# Patient Record
Sex: FEMALE | Race: WHITE | NOT HISPANIC OR LATINO | Employment: OTHER | URBAN - METROPOLITAN AREA
[De-identification: names, ages, dates, MRNs, and addresses within clinical notes are randomized per-mention and may not be internally consistent; named-entity substitution may affect disease eponyms.]

---

## 2017-01-16 ENCOUNTER — STROKE FOLLOW UP (OUTPATIENT)
Dept: NEUROSURGERY | Facility: CLINIC | Age: 77
End: 2017-01-16

## 2017-01-16 VITALS
RESPIRATION RATE: 16 BRPM | HEART RATE: 70 BPM | DIASTOLIC BLOOD PRESSURE: 74 MMHG | BODY MASS INDEX: 25.47 KG/M2 | SYSTOLIC BLOOD PRESSURE: 150 MMHG | WEIGHT: 138.4 LBS | TEMPERATURE: 98.3 F | HEIGHT: 62 IN

## 2017-01-16 DIAGNOSIS — Q21.12 PFO (PATENT FORAMEN OVALE): ICD-10-CM

## 2017-01-16 DIAGNOSIS — I10 ESSENTIAL HYPERTENSION: ICD-10-CM

## 2017-01-16 DIAGNOSIS — I63.9 ACUTE CVA (CEREBROVASCULAR ACCIDENT) (HCC): Primary | ICD-10-CM

## 2017-01-16 PROCEDURE — 99024 POSTOP FOLLOW-UP VISIT: CPT | Performed by: NEUROLOGICAL SURGERY

## 2017-01-16 NOTE — PATIENT INSTRUCTIONS
Patient will continue to see PCP on a regularly scheduled basis for follow up.  She will call if needed.  We talked about all of the things she has been doing for lifestyle changes such as going to [employer] and walking in the mall and how she is on the right track and should continue.  If there are any signs and symptoms of a stroke she should go to the nearest emergency room, ie: slurred speech, inability to use one side of the body or the other, facial droop.      It has been a pleasure to take care of Mrs. Vera.

## 2017-01-16 NOTE — MR AVS SNAPSHOT
Anabell Vera   1/16/2017 3:00 PM   Stroke Follow Up    Dept Phone:  [phone]   Encounter #:  [id number]    Provider:  Mari Serrano RN   Department:  [facility]                Your Full Care Plan              Your Updated Medication List          This list is accurate as of: 1/16/17  4:29 PM.  Always use your most recent med list.                ASPIRIN LOW DOSE 81 MG tablet   Generic drug:  aspirin       atorvastatin 80 MG tablet   Commonly known as:  LIPITOR   Take 0.5 tablets by mouth Every Night.       CALCIUM-MAGNESIUM-VITAMIN D PO       cholecalciferol 1000 UNITS tablet   Commonly known as:  VITAMIN D3       clopidogrel 75 MG tablet   Commonly known as:  PLAVIX   Take 1 tablet by mouth Daily.       MULTI VITAMIN/MINERALS PO       ramipril 10 MG capsule   Commonly known as:  ALTACE   Take 1 capsule by mouth Daily.               You Were Diagnosed With        Codes Comments    Acute CVA (cerebrovascular accident)    -  Primary ICD-10-CM: I63.9  ICD-9-CM: 434.91     Essential hypertension     ICD-10-CM: I10  ICD-9-CM: 401.9     PFO (patent foramen ovale)     ICD-10-CM: Q21.1  ICD-9-CM: 745.5       Instructions    Patient will continue to see PCP on a regularly scheduled basis for follow up.  She will call if needed.  We talked about all of the things she has been doing for lifestyle changes such as going to [employer] and walking in the mall and how she is on the right track and should continue.  If there are any signs and symptoms of a stroke she should go to the nearest emergency room, ie: slurred speech, inability to use one side of the body or the other, facial droop.      It has been a pleasure to take care of Mrs. Vera.      Patient Instructions History      Upcoming Appointments     Visit Type Date Time Department    STROKE FOLLOW UP 1/16/2017  3:00 PM [facility]    FOLLOW UP 2/3/2017 11:45 AM [facility]    "SUBSEQUENT MEDICARE WELLNESS 11/2/2017 10:45 AM [facility]    FOLLOW UP 12/8/2017 10:30 AM [facility]      MyChart Signup     Our records indicate that you have an active [facility] IPexpert account.    You can view your After Visit Summary by going to [employer] and logging in with your IPexpert username and password.  If you don't have a IPexpert username and password but a parent or guardian has access to your record, the parent or guardian should login with their own IPexpert username and password and access your record to view the After Visit Summary.    If you have questions, you can email [email or web address] or call [phone] to talk to our IPexpert staff.  Remember, IPexpert is NOT to be used for urgent needs.  For medical emergencies, dial 911.               Other Info from Your Visit           Your Appointments     Feb 03, 2017 11:45 AM EST   Follow Up with Leonardo Choi MD   [facility] [place] (--)    [address]  [facility] [address]   [phone]           Arrive 15 minutes prior to appointment.            Nov 02, 2017 10:45 AM EDT   Subsequent Medicare Wellness with Leonardo Choi MD   [facility] [place] (--)    [address]  [facility] [address]   [phone]            Dec 08, 2017 10:30 AM EST   Follow Up with Yoly Collins MD   [facility] [place] [facility] (--)    [address]  [facility] [address] [phone]           Arrive 15 minutes prior to appointment.              Allergies     Amoxicillin      Penicillins        Vital Signs     Blood Pressure Pulse Temperature Respirations Height Weight    150/74 70 98.3 °F (36.8 °C) (Temporal Artery ) 16 62\" (157.5 cm) 138 lb 6.4 oz (62.8 kg)    Body Mass Index Smoking Status                25.31 kg/m2 Former Smoker          Problems and Diagnoses Noted     Stroke "    High blood pressure    Congenital heart disease

## 2017-01-16 NOTE — PROGRESS NOTES
"Subjective:       Anabell Vera is a 76 y.o. female who presents for follow-up of a stroke. Event occurred 4 months ago. She has residual symptoms of no residual symptoms noted.. She denies syncope, seizures, gait disturbance, balance disturbance, inability to speak, slurred speech, cognitive impairment, swallowing difficulty. Overall she feels the condition is improved. Stroke risk factors include hyperlipidemia, hypertension and Patent foamen ovale. She also complains of none. She denies chest pain, palpitations, seizures and shortness of breath.     Outside reports reviewed: ER records, historical medical records and imaging reports: MRI of head which showed a Left periventricular white matter centrum semi-ovale CVA.    The following portions of the patient's history were reviewed and updated as appropriate: allergies, current medications, past family history, past medical history, past social history, past surgical history and problem list.    Review of Systems  A comprehensive review of systems was negative.      Objective:        Visit Vitals   • /74   • Pulse 70   • Temp 98.3 °F (36.8 °C) (Temporal Artery )   • Resp 16   • Ht 62\" (157.5 cm)   • Wt 138 lb 6.4 oz (62.8 kg)   • BMI 25.31 kg/m2     Neurologic: Alert and oriented X 3, normal strength and tone. Normal symmetric reflexes. Normal coordination and gait.    Imaging  No new imaging since hospitalization, however I reviewed the MRI from October.   Lab Review  No new labs to review.    Assessments:  NIHSS: 0  [place]: 0      Assessment:      Status post, a stroke  Risk Factor Management:   Hypertension target range 130-140/70-80  Lipid range - LDL < 100 and checked every 6 months, fasting.      Plan:      Medications: aspirin 81 mg orally every day and clopidogrel 75 mg orally every day  Patient is following up with Dr. Collins in 12 months or as needed.  She is also scheduled to see her PCP within the next week or two to evaluate LFT's and blood " pressure.   Follow-up with me in as needed.

## 2017-01-26 ENCOUNTER — LAB (OUTPATIENT)
Dept: INTERNAL MEDICINE | Facility: CLINIC | Age: 77
End: 2017-01-26

## 2017-01-26 DIAGNOSIS — C84.00 MYCOSIS FUNGOIDES (HCC): ICD-10-CM

## 2017-01-26 DIAGNOSIS — E78.5 ELEVATED LIPIDS: ICD-10-CM

## 2017-01-26 LAB
ALBUMIN SERPL-MCNC: 4.5 G/DL (ref 3.2–4.8)
ALBUMIN/GLOB SERPL: 2 G/DL (ref 1.5–2.5)
ALP SERPL-CCNC: 65 U/L (ref 25–100)
ALT SERPL W P-5'-P-CCNC: 28 U/L (ref 7–40)
ANION GAP SERPL CALCULATED.3IONS-SCNC: 7 MMOL/L (ref 3–11)
ARTICHOKE IGE QN: 67 MG/DL (ref 0–130)
AST SERPL-CCNC: 33 U/L (ref 0–33)
BASOPHILS # BLD MANUAL: 0 10*3/MM3 (ref 0–0.2)
BASOPHILS NFR BLD AUTO: 0 % (ref 0–1)
BILIRUB SERPL-MCNC: 0.8 MG/DL (ref 0.3–1.2)
BUN BLD-MCNC: 16 MG/DL (ref 9–23)
BUN/CREAT SERPL: 20 (ref 7–25)
CALCIUM SPEC-SCNC: 9.8 MG/DL (ref 8.7–10.4)
CHLORIDE SERPL-SCNC: 97 MMOL/L (ref 99–109)
CHOLEST SERPL-MCNC: 138 MG/DL (ref 0–200)
CO2 SERPL-SCNC: 30 MMOL/L (ref 20–31)
CREAT BLD-MCNC: 0.8 MG/DL (ref 0.6–1.3)
DEPRECATED RDW RBC AUTO: 42.4 FL (ref 37–54)
EOSINOPHIL # BLD MANUAL: 0.11 10*3/MM3 (ref 0.1–0.3)
EOSINOPHIL NFR BLD MANUAL: 2 % (ref 0–3)
ERYTHROCYTE [DISTWIDTH] IN BLOOD BY AUTOMATED COUNT: 12.9 % (ref 11.3–14.5)
GFR SERPL CREATININE-BSD FRML MDRD: 70 ML/MIN/1.73
GLOBULIN UR ELPH-MCNC: 2.3 GM/DL
GLUCOSE BLD-MCNC: 98 MG/DL (ref 70–100)
HCT VFR BLD AUTO: 43.1 % (ref 34.5–44)
HDLC SERPL-MCNC: 49 MG/DL (ref 40–60)
HGB BLD-MCNC: 14.5 G/DL (ref 11.5–15.5)
LYMPHOCYTES # BLD MANUAL: 2.37 10*3/MM3 (ref 0.6–4.8)
LYMPHOCYTES NFR BLD MANUAL: 42 % (ref 24–44)
LYMPHOCYTES NFR BLD MANUAL: 6 % (ref 0–12)
MCH RBC QN AUTO: 30.1 PG (ref 27–31)
MCHC RBC AUTO-ENTMCNC: 33.6 G/DL (ref 32–36)
MCV RBC AUTO: 89.4 FL (ref 80–99)
MONOCYTES # BLD AUTO: 0.34 10*3/MM3 (ref 0–1)
NEUTROPHILS # BLD AUTO: 2.82 10*3/MM3 (ref 1.5–8.3)
NEUTROPHILS NFR BLD MANUAL: 50 % (ref 41–71)
PLAT MORPH BLD: NORMAL
PLATELET # BLD AUTO: 168 10*3/MM3 (ref 150–450)
PMV BLD AUTO: 11.8 FL (ref 6–12)
POTASSIUM BLD-SCNC: 4.7 MMOL/L (ref 3.5–5.5)
PROT SERPL-MCNC: 6.8 G/DL (ref 5.7–8.2)
RBC # BLD AUTO: 4.82 10*6/MM3 (ref 3.89–5.14)
RBC MORPH BLD: NORMAL
SODIUM BLD-SCNC: 134 MMOL/L (ref 132–146)
TRIGL SERPL-MCNC: 68 MG/DL (ref 0–150)
WBC MORPH BLD: NORMAL
WBC NRBC COR # BLD: 5.64 10*3/MM3 (ref 3.5–10.8)

## 2017-01-26 PROCEDURE — 80053 COMPREHEN METABOLIC PANEL: CPT | Performed by: HOSPITALIST

## 2017-01-26 PROCEDURE — 80061 LIPID PANEL: CPT | Performed by: HOSPITALIST

## 2017-01-26 PROCEDURE — 85027 COMPLETE CBC AUTOMATED: CPT | Performed by: HOSPITALIST

## 2017-01-26 PROCEDURE — 85007 BL SMEAR W/DIFF WBC COUNT: CPT | Performed by: HOSPITALIST

## 2017-02-03 ENCOUNTER — OFFICE VISIT (OUTPATIENT)
Dept: INTERNAL MEDICINE | Facility: CLINIC | Age: 77
End: 2017-02-03

## 2017-02-03 VITALS
SYSTOLIC BLOOD PRESSURE: 110 MMHG | WEIGHT: 131 LBS | DIASTOLIC BLOOD PRESSURE: 74 MMHG | HEIGHT: 62 IN | BODY MASS INDEX: 24.11 KG/M2

## 2017-02-03 DIAGNOSIS — I10 ESSENTIAL HYPERTENSION: ICD-10-CM

## 2017-02-03 DIAGNOSIS — C84.05 MYCOSIS FUNGOIDES INVOLVING LYMPH NODES OF LOWER EXTREMITY (HCC): ICD-10-CM

## 2017-02-03 DIAGNOSIS — I63.9 ACUTE CVA (CEREBROVASCULAR ACCIDENT) (HCC): Primary | ICD-10-CM

## 2017-02-03 DIAGNOSIS — E78.49 OTHER HYPERLIPIDEMIA: ICD-10-CM

## 2017-02-03 PROCEDURE — 99214 OFFICE O/P EST MOD 30 MIN: CPT | Performed by: HOSPITALIST

## 2017-02-03 RX ORDER — CLOPIDOGREL BISULFATE 75 MG/1
75 TABLET ORAL DAILY
Qty: 90 TABLET | Refills: 1 | Status: SHIPPED | OUTPATIENT
Start: 2017-02-03 | End: 2017-08-14 | Stop reason: SDUPTHER

## 2017-02-03 NOTE — PROGRESS NOTES
"Subjective   Anabell Vera is a 76 y.o. female.     HPI Comments: She is here for f/u of her stroke, hyperlipidemia and her hematuria. She has seen Dr. 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 She has had a stroke and she is on Plavix. She had an event monitor       Follow-up (Elevated lipids)      The following portions of the patient's history were reviewed and updated as appropriate: allergies, current medications, past family history, past medical history, past social history, past surgical history and problem list.    Review of Systems   Constitutional: Negative for activity change and appetite change.   HENT: Negative for congestion and dental problem.    Eyes: Negative for discharge and itching.   Respiratory: Negative for apnea and chest tightness.    Cardiovascular: Negative for chest pain.   Gastrointestinal: Negative for abdominal distention, blood in stool and constipation.   Musculoskeletal: Negative for arthralgias and back pain.       Objective  Blood pressure 110/74, height 62\" (157.5 cm), weight 131 lb (59.4 kg).      Physical Exam   Constitutional: She is oriented to person, place, and time. She appears well-developed and well-nourished.   HENT:   Head: Normocephalic and atraumatic.   Eyes: Conjunctivae and EOM are normal. Pupils are equal, round, and reactive to light.   Neck: Normal range of motion. Neck supple.   Cardiovascular: Normal rate, regular rhythm, normal heart sounds and intact distal pulses.    Pulmonary/Chest: Effort normal and breath sounds normal.   Abdominal: Soft. Bowel sounds are normal.   Neurological: She is alert and oriented to person, place, and time. She has normal reflexes.   Skin: Skin is warm and dry.   Psychiatric: She has a normal mood and affect. Her behavior is normal. Judgment and thought content normal.       Assessment/Plan   Anabell was seen today for follow-up.    Diagnoses and all orders for this " visit:    Acute ischemic CVA with resolved right sided deficits  -     clopidogrel (PLAVIX) 75 MG tablet; Take 1 tablet by mouth Daily.    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

## 2017-05-31 ENCOUNTER — LAB (OUTPATIENT)
Dept: INTERNAL MEDICINE | Facility: CLINIC | Age: 77
End: 2017-05-31

## 2017-05-31 DIAGNOSIS — C84.05 MYCOSIS FUNGOIDES INVOLVING LYMPH NODES OF LOWER EXTREMITY (HCC): ICD-10-CM

## 2017-05-31 DIAGNOSIS — E78.49 OTHER HYPERLIPIDEMIA: ICD-10-CM

## 2017-05-31 DIAGNOSIS — I10 ESSENTIAL HYPERTENSION: ICD-10-CM

## 2017-05-31 LAB
ALBUMIN SERPL-MCNC: 4.4 G/DL (ref 3.2–4.8)
ALBUMIN/GLOB SERPL: 2.1 G/DL (ref 1.5–2.5)
ALP SERPL-CCNC: 63 U/L (ref 25–100)
ALT SERPL W P-5'-P-CCNC: 22 U/L (ref 7–40)
ANION GAP SERPL CALCULATED.3IONS-SCNC: 17 MMOL/L (ref 3–11)
ARTICHOKE IGE QN: 77 MG/DL (ref 0–130)
AST SERPL-CCNC: 27 U/L (ref 0–33)
BASOPHILS # BLD AUTO: 0.01 10*3/MM3 (ref 0–0.2)
BASOPHILS NFR BLD AUTO: 0.2 % (ref 0–1)
BILIRUB SERPL-MCNC: 0.7 MG/DL (ref 0.3–1.2)
BUN BLD-MCNC: 13 MG/DL (ref 9–23)
BUN/CREAT SERPL: 14.4 (ref 7–25)
CALCIUM SPEC-SCNC: 9.8 MG/DL (ref 8.7–10.4)
CHLORIDE SERPL-SCNC: 98 MMOL/L (ref 99–109)
CHOLEST SERPL-MCNC: 139 MG/DL (ref 0–200)
CO2 SERPL-SCNC: 20 MMOL/L (ref 20–31)
CREAT BLD-MCNC: 0.9 MG/DL (ref 0.6–1.3)
DEPRECATED RDW RBC AUTO: 42.6 FL (ref 37–54)
EOSINOPHIL # BLD AUTO: 0.04 10*3/MM3 (ref 0.1–0.3)
EOSINOPHIL NFR BLD AUTO: 0.6 % (ref 0–3)
ERYTHROCYTE [DISTWIDTH] IN BLOOD BY AUTOMATED COUNT: 13 % (ref 11.3–14.5)
GFR SERPL CREATININE-BSD FRML MDRD: 61 ML/MIN/1.73
GLOBULIN UR ELPH-MCNC: 2.1 GM/DL
GLUCOSE BLD-MCNC: 78 MG/DL (ref 70–100)
HCT VFR BLD AUTO: 41.8 % (ref 34.5–44)
HDLC SERPL-MCNC: 47 MG/DL (ref 40–60)
HGB BLD-MCNC: 13.7 G/DL (ref 11.5–15.5)
IMM GRANULOCYTES # BLD: 0.01 10*3/MM3 (ref 0–0.03)
IMM GRANULOCYTES NFR BLD: 0.2 % (ref 0–0.6)
LYMPHOCYTES # BLD AUTO: 1.72 10*3/MM3 (ref 0.6–4.8)
LYMPHOCYTES NFR BLD AUTO: 27 % (ref 24–44)
MCH RBC QN AUTO: 29.5 PG (ref 27–31)
MCHC RBC AUTO-ENTMCNC: 32.8 G/DL (ref 32–36)
MCV RBC AUTO: 90.1 FL (ref 80–99)
MONOCYTES # BLD AUTO: 0.75 10*3/MM3 (ref 0–1)
MONOCYTES NFR BLD AUTO: 11.8 % (ref 0–12)
NEUTROPHILS # BLD AUTO: 3.85 10*3/MM3 (ref 1.5–8.3)
NEUTROPHILS NFR BLD AUTO: 60.2 % (ref 41–71)
PLATELET # BLD AUTO: 136 10*3/MM3 (ref 150–450)
PMV BLD AUTO: 12.2 FL (ref 6–12)
POTASSIUM BLD-SCNC: 4.9 MMOL/L (ref 3.5–5.5)
PROT SERPL-MCNC: 6.5 G/DL (ref 5.7–8.2)
RBC # BLD AUTO: 4.64 10*6/MM3 (ref 3.89–5.14)
SODIUM BLD-SCNC: 135 MMOL/L (ref 132–146)
TRIGL SERPL-MCNC: 63 MG/DL (ref 0–150)
WBC NRBC COR # BLD: 6.38 10*3/MM3 (ref 3.5–10.8)

## 2017-05-31 PROCEDURE — 80061 LIPID PANEL: CPT | Performed by: HOSPITALIST

## 2017-05-31 PROCEDURE — 85025 COMPLETE CBC W/AUTO DIFF WBC: CPT | Performed by: HOSPITALIST

## 2017-05-31 PROCEDURE — 80053 COMPREHEN METABOLIC PANEL: CPT | Performed by: HOSPITALIST

## 2017-06-02 ENCOUNTER — TELEPHONE (OUTPATIENT)
Dept: INTERNAL MEDICINE | Facility: CLINIC | Age: 77
End: 2017-06-02

## 2017-06-02 DIAGNOSIS — D69.6 THROMBOCYTOPENIA (HCC): Primary | ICD-10-CM

## 2017-06-13 ENCOUNTER — LAB (OUTPATIENT)
Dept: INTERNAL MEDICINE | Facility: CLINIC | Age: 77
End: 2017-06-13

## 2017-06-13 DIAGNOSIS — D69.6 THROMBOCYTOPENIA (HCC): ICD-10-CM

## 2017-06-13 LAB
BASOPHILS # BLD AUTO: 0.01 10*3/MM3 (ref 0–0.2)
BASOPHILS NFR BLD AUTO: 0.2 % (ref 0–1)
DEPRECATED RDW RBC AUTO: 42.5 FL (ref 37–54)
EOSINOPHIL # BLD AUTO: 0.11 10*3/MM3 (ref 0.1–0.3)
EOSINOPHIL NFR BLD AUTO: 1.7 % (ref 0–3)
ERYTHROCYTE [DISTWIDTH] IN BLOOD BY AUTOMATED COUNT: 13 % (ref 11.3–14.5)
HCT VFR BLD AUTO: 40.9 % (ref 34.5–44)
HGB BLD-MCNC: 13.4 G/DL (ref 11.5–15.5)
IMM GRANULOCYTES # BLD: 0.01 10*3/MM3 (ref 0–0.03)
IMM GRANULOCYTES NFR BLD: 0.2 % (ref 0–0.6)
LYMPHOCYTES # BLD AUTO: 1.85 10*3/MM3 (ref 0.6–4.8)
LYMPHOCYTES NFR BLD AUTO: 27.9 % (ref 24–44)
MCH RBC QN AUTO: 29.5 PG (ref 27–31)
MCHC RBC AUTO-ENTMCNC: 32.8 G/DL (ref 32–36)
MCV RBC AUTO: 89.9 FL (ref 80–99)
MONOCYTES # BLD AUTO: 0.72 10*3/MM3 (ref 0–1)
MONOCYTES NFR BLD AUTO: 10.8 % (ref 0–12)
NEUTROPHILS # BLD AUTO: 3.94 10*3/MM3 (ref 1.5–8.3)
NEUTROPHILS NFR BLD AUTO: 59.2 % (ref 41–71)
PLATELET # BLD AUTO: 166 10*3/MM3 (ref 150–450)
PMV BLD AUTO: 11.8 FL (ref 6–12)
RBC # BLD AUTO: 4.55 10*6/MM3 (ref 3.89–5.14)
WBC NRBC COR # BLD: 6.64 10*3/MM3 (ref 3.5–10.8)

## 2017-06-13 PROCEDURE — 85025 COMPLETE CBC W/AUTO DIFF WBC: CPT | Performed by: HOSPITALIST

## 2017-08-02 ENCOUNTER — OFFICE VISIT (OUTPATIENT)
Dept: INTERNAL MEDICINE | Facility: CLINIC | Age: 77
End: 2017-08-02

## 2017-08-02 VITALS
DIASTOLIC BLOOD PRESSURE: 82 MMHG | HEART RATE: 68 BPM | BODY MASS INDEX: 24.95 KG/M2 | SYSTOLIC BLOOD PRESSURE: 148 MMHG | WEIGHT: 135.6 LBS | OXYGEN SATURATION: 97 % | HEIGHT: 62 IN | TEMPERATURE: 99 F

## 2017-08-02 DIAGNOSIS — E55.9 VITAMIN D DEFICIENCY: Primary | ICD-10-CM

## 2017-08-02 DIAGNOSIS — I10 ESSENTIAL HYPERTENSION: ICD-10-CM

## 2017-08-02 DIAGNOSIS — Q21.12 PFO (PATENT FORAMEN OVALE): ICD-10-CM

## 2017-08-02 DIAGNOSIS — Z78.0 MENOPAUSE: ICD-10-CM

## 2017-08-02 DIAGNOSIS — Z13.820 ENCOUNTER FOR SCREENING FOR OSTEOPOROSIS: ICD-10-CM

## 2017-08-02 DIAGNOSIS — E78.5 ELEVATED LIPIDS: ICD-10-CM

## 2017-08-02 PROBLEM — Z86.73 HISTORY OF EMBOLIC STROKE WITHOUT RESIDUAL DEFICITS: Status: ACTIVE | Noted: 2017-08-02

## 2017-08-02 PROCEDURE — 99214 OFFICE O/P EST MOD 30 MIN: CPT | Performed by: FAMILY MEDICINE

## 2017-08-02 RX ORDER — CALCIUM CARBONATE 200(500)MG
1 TABLET,CHEWABLE ORAL DAILY
COMMUNITY
End: 2018-01-12

## 2017-08-02 NOTE — PROGRESS NOTES
"Subjective   Anabell Vera is a 77 y.o. female.     Chief Complaint   Patient presents with   • Establish Care     former Dr. Choi patient       Visit Vitals   • /82   • Pulse 68   • Temp 99 °F (37.2 °C)   • Ht 62\" (157.5 cm)   • Wt 135 lb 9.6 oz (61.5 kg)   • LMP  (LMP Unknown)   • SpO2 97%   • BMI 24.8 kg/m2       Hyperlipidemia   This is a chronic problem. The current episode started more than 1 year ago. The problem is controlled. Recent lipid tests were reviewed and are normal. She has no history of chronic renal disease, diabetes, hypothyroidism, liver disease, obesity or nephrotic syndrome. There are no known factors aggravating her hyperlipidemia. Pertinent negatives include no chest pain, focal sensory loss, focal weakness, leg pain, myalgias or shortness of breath. Current antihyperlipidemic treatment includes statins. The current treatment provides significant improvement of lipids. There are no compliance problems.  Risk factors for coronary artery disease include hypertension and post-menopausal.   Hypertension   This is a chronic problem. The current episode started more than 1 year ago. The problem is unchanged. The problem is controlled. Pertinent negatives include no anxiety, blurred vision, chest pain, headaches, malaise/fatigue, neck pain, orthopnea, palpitations, peripheral edema, PND, shortness of breath or sweats. There are no associated agents to hypertension. Risk factors for coronary artery disease include dyslipidemia, family history and post-menopausal state. Past treatments include ACE inhibitors. The current treatment provides significant improvement. There are no compliance problems.  Hypertensive end-organ damage includes CAD/MI and CVA. There is no history of angina, kidney disease, heart failure, left ventricular hypertrophy, PVD, retinopathy or a thyroid problem. There is no history of chronic renal disease or sleep apnea.      Pt is here to establish.  Pt had a stroke last " year in October for 2 days of symptoms. Thought to be clot from legs going through PFO.    Pt also had high blood pressure in the hospital.  Pt has PFO. 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     Pt was on a holter monitor which was ok.   Pt has growth on right kidney that was found by CT at [facility] with a f/u end of August. It has remained stable for a year.    The following portions of the patient's history were reviewed and updated as appropriate: allergies, current medications, past family history, past medical history, past social history, past surgical history and problem list.     Past Medical History:   Diagnosis Date   • Abdominal pain, epigastric    • Abdominal pain, RLQ (right lower quadrant)    • Diverticulosis of colon    • Easy bruising     on blood thinners   • Esophageal reflux    • Fractures    • Gallstones    • GERD (gastroesophageal reflux disease)    • H/O mammogram 08/12/2016   • High cholesterol    • Hypertension    • Malignant neoplasm of skin    • Pap smear for cervical cancer screening 08/14/2015   • Positive TB test    • Post-cholecystectomy syndrome    • Right kidney mass 2016    found by Dr Cameron Hall   • Stroke    • Thyroid disease    • Thyroid disease      Past Surgical History:   Procedure Laterality Date   • CHOLECYSTECTOMY  12/04/2012       Allergies   Allergen Reactions   • Amoxicillin    • Penicillins        Family History   Problem Relation Age of Onset   • Arthritis Mother    • Diabetes Mother    • Cancer Father    • Colon cancer Father    • Diabetes Sister    • Heart attack Sister    • Diabetes Brother    • Heart attack Brother    • Cancer Brother    • Colon polyps Brother    • Prostate cancer Brother    • Diabetes Son    • Diabetes Maternal Grandmother    • Cervical cancer Maternal Grandmother    • Cancer Brother      on lips   • Breast cancer Neg Hx    • Ovarian cancer Neg Hx        Social History     Social History   • Marital status:      Spouse name: N/A   •  Number of children: N/A   • Years of education: N/A     Occupational History   • Not on file.     Social History Main Topics   • Smoking status: Former Smoker     Years: 2.00     Quit date: 01/1964   • Smokeless tobacco: Never Used      Comment: 2 years only   • Alcohol use Yes      Comment: rarely drinks wine   • Drug use: No   • Sexual activity: Defer     Other Topics Concern   • Not on file     Social History Narrative   • No narrative on file           Review of Systems   Constitutional: Negative.  Negative for chills, diaphoresis, fatigue, fever and malaise/fatigue.   HENT: Positive for postnasal drip. Negative for ear pain, nosebleeds, rhinorrhea, sinus pressure, sneezing and sore throat.    Eyes: Negative.  Negative for blurred vision, redness and itching.   Respiratory: Negative.  Negative for cough, shortness of breath and wheezing.    Cardiovascular: Negative.  Negative for chest pain, palpitations, orthopnea and PND.   Gastrointestinal: Negative.  Negative for abdominal pain, constipation, diarrhea, nausea and vomiting.   Endocrine: Negative.  Negative for cold intolerance and heat intolerance.   Genitourinary: Negative.  Negative for dysuria, frequency, hematuria and urgency.   Musculoskeletal: Negative.  Negative for arthralgias, back pain, myalgias and neck pain.   Skin: Positive for rash. Negative for color change.        Eczema on occasion   Allergic/Immunologic: Positive for environmental allergies.   Neurological: Negative.  Negative for dizziness, focal weakness, syncope, light-headedness and headaches.   Hematological: Negative for adenopathy. Bruises/bleeds easily.   Psychiatric/Behavioral: Negative.  Negative for dysphoric mood. The patient is not nervous/anxious.        Objective   Physical Exam   Constitutional: She is oriented to person, place, and time. She appears well-developed.   HENT:   Head: Normocephalic.   Right Ear: External ear normal.   Left Ear: External ear normal.   Nose: Nose  normal.   Eyes: Conjunctivae and EOM are normal. Pupils are equal, round, and reactive to light.   Neck: Normal range of motion. Neck supple. Carotid bruit is not present. No thyroid mass and no thyromegaly present.   Cardiovascular: Normal rate and regular rhythm.    No murmur heard.  Pulmonary/Chest: Effort normal and breath sounds normal. No respiratory distress. She has no decreased breath sounds. She has no wheezes. She has no rhonchi. She has no rales. She exhibits no tenderness.   Abdominal: Soft. Bowel sounds are normal. There is no tenderness.   Musculoskeletal: Normal range of motion. She exhibits no edema.   Neurological: She is alert and oriented to person, place, and time.   Skin: Skin is warm and dry.   Psychiatric: She has a normal mood and affect. Her behavior is normal.   Nursing note and vitals reviewed.      Assessment/Plan   Anabell was seen today for establish care.    Diagnoses and all orders for this visit:    Vitamin D deficiency    Essential hypertension    PFO (patent foramen ovale)    Elevated lipids    Menopause  -     DEXA Bone Density Axial    Encounter for screening for osteoporosis  -     DEXA Bone Density Axial                   Current Outpatient Prescriptions:   •  calcium carbonate (TUMS) 500 MG chewable tablet, Chew 1 tablet Daily., Disp: , Rfl:   •  aspirin (ASPIRIN LOW DOSE) 81 MG tablet, Take  by mouth daily., Disp: , Rfl:   •  atorvastatin (LIPITOR) 80 MG tablet, Take 0.5 tablets by mouth Every Night., Disp: 45 tablet, Rfl: 3  •  CALCIUM-MAGNESIUM-VITAMIN D PO, Take 1 capsule by mouth Daily. Calcium 600 mg-Magnesium 300-Vitamin D 400 iu, Disp: , Rfl:   •  cholecalciferol (VITAMIN D3) 1000 UNITS tablet, Take 1,000 Units by mouth Daily., Disp: , Rfl:   •  clopidogrel (PLAVIX) 75 MG tablet, Take 1 tablet by mouth Daily., Disp: 90 tablet, Rfl: 1  •  Multiple Vitamins-Minerals (MULTI VITAMIN/MINERALS PO), Take  by mouth daily., Disp: , Rfl:   •  ramipril (ALTACE) 10 MG capsule, Take  1 capsule by mouth Daily., Disp: 90 capsule, Rfl: 3    Return in about 3 months (around 11/2/2017) for Recheck- recheck bp with nurse 1 -2 weeks.    .  Recent Results (from the past 1680 hour(s))   Comprehensive Metabolic Panel    Collection Time: 05/31/17  2:16 PM   Result Value Ref Range    Glucose 78 70 - 100 mg/dL    BUN 13 9 - 23 mg/dL    Creatinine 0.90 0.60 - 1.30 mg/dL    Sodium 135 132 - 146 mmol/L    Potassium 4.9 3.5 - 5.5 mmol/L    Chloride 98 (L) 99 - 109 mmol/L    CO2 20.0 20.0 - 31.0 mmol/L    Calcium 9.8 8.7 - 10.4 mg/dL    Total Protein 6.5 5.7 - 8.2 g/dL    Albumin 4.40 3.20 - 4.80 g/dL    ALT (SGPT) 22 7 - 40 U/L    AST (SGOT) 27 0 - 33 U/L    Alkaline Phosphatase 63 25 - 100 U/L    Total Bilirubin 0.7 0.3 - 1.2 mg/dL    eGFR Non African Amer 61 >60 mL/min/1.73    Globulin 2.1 gm/dL    A/G Ratio 2.1 1.5 - 2.5 g/dL    BUN/Creatinine Ratio 14.4 7.0 - 25.0    Anion Gap 17.0 (H) 3.0 - 11.0 mmol/L   Lipid Panel    Collection Time: 05/31/17  2:16 PM   Result Value Ref Range    Total Cholesterol 139 0 - 200 mg/dL    Triglycerides 63 0 - 150 mg/dL    HDL Cholesterol 47 40 - 60 mg/dL    LDL Cholesterol  77 0 - 130 mg/dL   CBC Auto Differential    Collection Time: 05/31/17  2:16 PM   Result Value Ref Range    WBC 6.38 3.50 - 10.80 10*3/mm3    RBC 4.64 3.89 - 5.14 10*6/mm3    Hemoglobin 13.7 11.5 - 15.5 g/dL    Hematocrit 41.8 34.5 - 44.0 %    MCV 90.1 80.0 - 99.0 fL    MCH 29.5 27.0 - 31.0 pg    MCHC 32.8 32.0 - 36.0 g/dL    RDW 13.0 11.3 - 14.5 %    RDW-SD 42.6 37.0 - 54.0 fl    MPV 12.2 (H) 6.0 - 12.0 fL    Platelets 136 (L) 150 - 450 10*3/mm3    Neutrophil % 60.2 41.0 - 71.0 %    Lymphocyte % 27.0 24.0 - 44.0 %    Monocyte % 11.8 0.0 - 12.0 %    Eosinophil % 0.6 0.0 - 3.0 %    Basophil % 0.2 0.0 - 1.0 %    Immature Grans % 0.2 0.0 - 0.6 %    Neutrophils, Absolute 3.85 1.50 - 8.30 10*3/mm3    Lymphocytes, Absolute 1.72 0.60 - 4.80 10*3/mm3    Monocytes, Absolute 0.75 0.00 - 1.00 10*3/mm3    Eosinophils,  Absolute 0.04 (L) 0.10 - 0.30 10*3/mm3    Basophils, Absolute 0.01 0.00 - 0.20 10*3/mm3    Immature Grans, Absolute 0.01 0.00 - 0.03 10*3/mm3   CBC Auto Differential    Collection Time: 06/13/17 12:13 PM   Result Value Ref Range    WBC 6.64 3.50 - 10.80 10*3/mm3    RBC 4.55 3.89 - 5.14 10*6/mm3    Hemoglobin 13.4 11.5 - 15.5 g/dL    Hematocrit 40.9 34.5 - 44.0 %    MCV 89.9 80.0 - 99.0 fL    MCH 29.5 27.0 - 31.0 pg    MCHC 32.8 32.0 - 36.0 g/dL    RDW 13.0 11.3 - 14.5 %    RDW-SD 42.5 37.0 - 54.0 fl    MPV 11.8 6.0 - 12.0 fL    Platelets 166 150 - 450 10*3/mm3    Neutrophil % 59.2 41.0 - 71.0 %    Lymphocyte % 27.9 24.0 - 44.0 %    Monocyte % 10.8 0.0 - 12.0 %    Eosinophil % 1.7 0.0 - 3.0 %    Basophil % 0.2 0.0 - 1.0 %    Immature Grans % 0.2 0.0 - 0.6 %    Neutrophils, Absolute 3.94 1.50 - 8.30 10*3/mm3    Lymphocytes, Absolute 1.85 0.60 - 4.80 10*3/mm3    Monocytes, Absolute 0.72 0.00 - 1.00 10*3/mm3    Eosinophils, Absolute 0.11 0.10 - 0.30 10*3/mm3    Basophils, Absolute 0.01 0.00 - 0.20 10*3/mm3    Immature Grans, Absolute 0.01 0.00 - 0.03 10*3/mm3

## 2017-08-14 DIAGNOSIS — I63.9 ACUTE CVA (CEREBROVASCULAR ACCIDENT) (HCC): ICD-10-CM

## 2017-08-15 RX ORDER — CLOPIDOGREL BISULFATE 75 MG/1
TABLET ORAL
Qty: 90 TABLET | Refills: 0 | Status: SHIPPED | OUTPATIENT
Start: 2017-08-15 | End: 2017-11-13 | Stop reason: SDUPTHER

## 2017-08-16 ENCOUNTER — CLINICAL SUPPORT (OUTPATIENT)
Dept: INTERNAL MEDICINE | Facility: CLINIC | Age: 77
End: 2017-08-16

## 2017-08-16 VITALS — HEART RATE: 70 BPM | DIASTOLIC BLOOD PRESSURE: 78 MMHG | SYSTOLIC BLOOD PRESSURE: 130 MMHG

## 2017-08-17 ENCOUNTER — TELEPHONE (OUTPATIENT)
Dept: INTERNAL MEDICINE | Facility: CLINIC | Age: 77
End: 2017-08-17

## 2017-08-17 NOTE — TELEPHONE ENCOUNTER
----- Message from Alayna PEREZ MD sent at 8/17/2017  4:33 PM EDT -----  No change  ----- Message -----     From: Ranjana Morris MA     Sent: 8/16/2017   2:42 PM       To: Alayna PEREZ MD    Patient present for BP check today. BP was 130/78 and HR 70.

## 2017-09-05 ENCOUNTER — OFFICE VISIT (OUTPATIENT)
Dept: INTERNAL MEDICINE | Facility: CLINIC | Age: 77
End: 2017-09-05

## 2017-09-05 VITALS
TEMPERATURE: 99.1 F | OXYGEN SATURATION: 99 % | BODY MASS INDEX: 24.95 KG/M2 | DIASTOLIC BLOOD PRESSURE: 82 MMHG | SYSTOLIC BLOOD PRESSURE: 138 MMHG | HEIGHT: 62 IN | WEIGHT: 135.6 LBS | HEART RATE: 67 BPM

## 2017-09-05 DIAGNOSIS — H10.32 ACUTE CONJUNCTIVITIS OF LEFT EYE, UNSPECIFIED ACUTE CONJUNCTIVITIS TYPE: Primary | ICD-10-CM

## 2017-09-05 PROCEDURE — 99213 OFFICE O/P EST LOW 20 MIN: CPT | Performed by: NURSE PRACTITIONER

## 2017-09-05 RX ORDER — POLYMYXIN B SULFATE AND TRIMETHOPRIM 1; 10000 MG/ML; [USP'U]/ML
1 SOLUTION OPHTHALMIC EVERY 4 HOURS
Qty: 10 ML | Refills: 0 | Status: SHIPPED | OUTPATIENT
Start: 2017-09-05 | End: 2017-09-12

## 2017-09-05 NOTE — PROGRESS NOTES
Subjective   Anabell Vera is a 77 y.o. female.     Conjunctivitis    The current episode started yesterday. The onset was gradual. The problem has been gradually worsening. The problem is mild. Nothing aggravates the symptoms. Associated symptoms include eye itching, eye pain and eye redness. Pertinent negatives include no fever, no decreased vision, no double vision, no photophobia, no nausea, no vomiting, no congestion, no ear discharge, no ear pain, no headaches, no hearing loss, no mouth sores, no rhinorrhea, no sore throat, no stridor, no swollen glands and no eye discharge. The eye pain is mild. The left eye is affected. The eye pain is not associated with movement.    Left eye was itching last night, then work up this morning and noticed that the eye was red and slightly swollen.  No drainage noted.  Feels like there is something in the eye.      The following portions of the patient's history were reviewed and updated as appropriate: allergies, current medications, past family history, past medical history, past social history, past surgical history and problem list.    Review of Systems   Constitutional: Negative for fever.   HENT: Negative for congestion, ear discharge, ear pain, hearing loss, mouth sores, rhinorrhea and sore throat.    Eyes: Positive for pain, redness and itching. Negative for double vision, photophobia, discharge and visual disturbance.   Respiratory: Negative for stridor.    Gastrointestinal: Negative for nausea and vomiting.   Neurological: Negative for headaches.   All other systems reviewed and are negative.      Objective   Physical Exam   Constitutional: She is oriented to person, place, and time. She appears well-developed and well-nourished. No distress.   HENT:   Head: Normocephalic and atraumatic.   Eyes: EOM and lids are normal. Pupils are equal, round, and reactive to light. Lids are everted and swept, no foreign bodies found. Right eye exhibits no chemosis, no discharge, no  exudate and no hordeolum. No foreign body present in the right eye. Left eye exhibits chemosis. Left eye exhibits no discharge, no exudate and no hordeolum. No foreign body present in the left eye. Right conjunctiva has no hemorrhage. Left conjunctiva is injected. Left conjunctiva has no hemorrhage. No scleral icterus.   Neck: Normal range of motion. Neck supple.   Lymphadenopathy:     She has no cervical adenopathy.   Neurological: She is alert and oriented to person, place, and time.   Skin: She is not diaphoretic.   Psychiatric: She has a normal mood and affect. Her behavior is normal.   Nursing note and vitals reviewed.      Assessment/Plan   Anabell was seen today for blepharitis.    Diagnoses and all orders for this visit:    Acute conjunctivitis of left eye, unspecified acute conjunctivitis type    Other orders  -     trimethoprim-polymyxin b (POLYTRIM) 22339-8.1 UNIT/ML-% ophthalmic solution; Administer 1 drop into the left eye Every 4 (Four) Hours for 7 days.        Polytrim opth  as directed  Warm compresses  Dispose of any eye care products used recently  RTC PRN or with worsening of sx's  Plan of care discussed with pt. They verbalized understanding and agreement.

## 2017-10-18 ENCOUNTER — TRANSCRIBE ORDERS (OUTPATIENT)
Dept: ADMINISTRATIVE | Facility: HOSPITAL | Age: 77
End: 2017-10-18

## 2017-10-18 DIAGNOSIS — Z12.31 VISIT FOR SCREENING MAMMOGRAM: Primary | ICD-10-CM

## 2017-11-13 DIAGNOSIS — I63.9 ACUTE CVA (CEREBROVASCULAR ACCIDENT) (HCC): ICD-10-CM

## 2017-11-14 RX ORDER — CLOPIDOGREL BISULFATE 75 MG/1
TABLET ORAL
Qty: 90 TABLET | Refills: 0 | Status: SHIPPED | OUTPATIENT
Start: 2017-11-14 | End: 2018-02-13 | Stop reason: SDUPTHER

## 2017-11-21 ENCOUNTER — HOSPITAL ENCOUNTER (OUTPATIENT)
Dept: MAMMOGRAPHY | Facility: HOSPITAL | Age: 77
Discharge: HOME | End: 2017-11-21
Admitting: FAMILY MEDICINE

## 2017-11-21 DIAGNOSIS — Z12.31 VISIT FOR SCREENING MAMMOGRAM: ICD-10-CM

## 2017-11-21 PROCEDURE — 77063 BREAST TOMOSYNTHESIS BI: CPT | Performed by: RADIOLOGY

## 2017-11-21 PROCEDURE — G0202 SCR MAMMO BI INCL CAD: HCPCS

## 2017-11-21 PROCEDURE — 77063 BREAST TOMOSYNTHESIS BI: CPT

## 2017-11-21 PROCEDURE — G0202 SCR MAMMO BI INCL CAD: HCPCS | Performed by: RADIOLOGY

## 2017-12-01 ENCOUNTER — HOSPITAL ENCOUNTER (OUTPATIENT)
Dept: ULTRASOUND IMAGING | Facility: HOSPITAL | Age: 77
Discharge: HOME | End: 2017-12-01
Admitting: FAMILY MEDICINE

## 2017-12-01 DIAGNOSIS — R92.8 ABNORMAL MAMMOGRAM: ICD-10-CM

## 2017-12-01 PROCEDURE — 76642 ULTRASOUND BREAST LIMITED: CPT

## 2017-12-01 PROCEDURE — 76642 ULTRASOUND BREAST LIMITED: CPT | Performed by: RADIOLOGY

## 2017-12-01 RX ORDER — RAMIPRIL 10 MG/1
CAPSULE ORAL
Qty: 90 CAPSULE | Refills: 0 | OUTPATIENT
Start: 2017-12-01

## 2017-12-01 RX ORDER — RAMIPRIL 10 MG/1
CAPSULE ORAL
Qty: 90 CAPSULE | Refills: 0 | Status: SHIPPED | OUTPATIENT
Start: 2017-12-01 | End: 2018-02-28 | Stop reason: SDUPTHER

## 2017-12-20 ENCOUNTER — OFFICE VISIT (OUTPATIENT)
Dept: INTERNAL MEDICINE | Facility: CLINIC | Age: 77
End: 2017-12-20

## 2017-12-20 ENCOUNTER — TELEPHONE (OUTPATIENT)
Dept: PHARMACY | Facility: HOSPITAL | Age: 77
End: 2017-12-20

## 2017-12-20 VITALS
DIASTOLIC BLOOD PRESSURE: 76 MMHG | SYSTOLIC BLOOD PRESSURE: 138 MMHG | TEMPERATURE: 98.7 F | OXYGEN SATURATION: 98 % | BODY MASS INDEX: 24.36 KG/M2 | WEIGHT: 133.2 LBS | HEART RATE: 70 BPM

## 2017-12-20 DIAGNOSIS — N28.89 RIGHT KIDNEY MASS: ICD-10-CM

## 2017-12-20 DIAGNOSIS — K21.00 GASTROESOPHAGEAL REFLUX DISEASE WITH ESOPHAGITIS: ICD-10-CM

## 2017-12-20 DIAGNOSIS — E78.5 HYPERLIPIDEMIA, UNSPECIFIED HYPERLIPIDEMIA TYPE: Primary | ICD-10-CM

## 2017-12-20 DIAGNOSIS — D69.6 TEMPORARY LOW PLATELET COUNT (HCC): ICD-10-CM

## 2017-12-20 DIAGNOSIS — Z79.01 ANTICOAGULANT LONG-TERM USE: ICD-10-CM

## 2017-12-20 LAB
ALBUMIN SERPL-MCNC: 4.5 G/DL (ref 3.2–4.8)
ALBUMIN/GLOB SERPL: 2.1 G/DL (ref 1.5–2.5)
ALP SERPL-CCNC: 62 U/L (ref 25–100)
ALT SERPL W P-5'-P-CCNC: 19 U/L (ref 7–40)
ANION GAP SERPL CALCULATED.3IONS-SCNC: 7 MMOL/L (ref 3–11)
ARTICHOKE IGE QN: 64 MG/DL (ref 0–130)
AST SERPL-CCNC: 25 U/L (ref 0–33)
BASOPHILS # BLD AUTO: 0.02 10*3/MM3 (ref 0–0.2)
BASOPHILS NFR BLD AUTO: 0.3 % (ref 0–1)
BILIRUB SERPL-MCNC: 0.8 MG/DL (ref 0.3–1.2)
BUN BLD-MCNC: 10 MG/DL (ref 9–23)
BUN/CREAT SERPL: 12.5 (ref 7–25)
CALCIUM SPEC-SCNC: 9.3 MG/DL (ref 8.7–10.4)
CHLORIDE SERPL-SCNC: 97 MMOL/L (ref 99–109)
CHOLEST SERPL-MCNC: 129 MG/DL (ref 0–200)
CO2 SERPL-SCNC: 28 MMOL/L (ref 20–31)
CREAT BLD-MCNC: 0.8 MG/DL (ref 0.6–1.3)
DEPRECATED RDW RBC AUTO: 43.1 FL (ref 37–54)
EOSINOPHIL # BLD AUTO: 0.04 10*3/MM3 (ref 0–0.3)
EOSINOPHIL NFR BLD AUTO: 0.5 % (ref 0–3)
ERYTHROCYTE [DISTWIDTH] IN BLOOD BY AUTOMATED COUNT: 12.8 % (ref 11.3–14.5)
GFR SERPL CREATININE-BSD FRML MDRD: 70 ML/MIN/1.73
GLOBULIN UR ELPH-MCNC: 2.1 GM/DL
GLUCOSE BLD-MCNC: 88 MG/DL (ref 70–100)
HCT VFR BLD AUTO: 45.2 % (ref 34.5–44)
HDLC SERPL-MCNC: 50 MG/DL (ref 40–60)
HGB BLD-MCNC: 14.5 G/DL (ref 11.5–15.5)
IMM GRANULOCYTES # BLD: 0.01 10*3/MM3 (ref 0–0.03)
IMM GRANULOCYTES NFR BLD: 0.1 % (ref 0–0.6)
LYMPHOCYTES # BLD AUTO: 1.43 10*3/MM3 (ref 0.6–4.8)
LYMPHOCYTES NFR BLD AUTO: 19.6 % (ref 24–44)
MCH RBC QN AUTO: 29.5 PG (ref 27–31)
MCHC RBC AUTO-ENTMCNC: 32.1 G/DL (ref 32–36)
MCV RBC AUTO: 91.9 FL (ref 80–99)
MONOCYTES # BLD AUTO: 0.88 10*3/MM3 (ref 0–1)
MONOCYTES NFR BLD AUTO: 12.1 % (ref 0–12)
NEUTROPHILS # BLD AUTO: 4.91 10*3/MM3 (ref 1.5–8.3)
NEUTROPHILS NFR BLD AUTO: 67.4 % (ref 41–71)
PLATELET # BLD AUTO: 170 10*3/MM3 (ref 150–450)
PMV BLD AUTO: 11.8 FL (ref 6–12)
POTASSIUM BLD-SCNC: 4.2 MMOL/L (ref 3.5–5.5)
PROT SERPL-MCNC: 6.6 G/DL (ref 5.7–8.2)
RBC # BLD AUTO: 4.92 10*6/MM3 (ref 3.89–5.14)
SODIUM BLD-SCNC: 132 MMOL/L (ref 132–146)
TRIGL SERPL-MCNC: 107 MG/DL (ref 0–150)
WBC NRBC COR # BLD: 7.29 10*3/MM3 (ref 3.5–10.8)

## 2017-12-20 PROCEDURE — 83013 H PYLORI (C-13) BREATH: CPT | Performed by: FAMILY MEDICINE

## 2017-12-20 PROCEDURE — 80053 COMPREHEN METABOLIC PANEL: CPT | Performed by: FAMILY MEDICINE

## 2017-12-20 PROCEDURE — 80061 LIPID PANEL: CPT | Performed by: FAMILY MEDICINE

## 2017-12-20 PROCEDURE — 99214 OFFICE O/P EST MOD 30 MIN: CPT | Performed by: FAMILY MEDICINE

## 2017-12-20 PROCEDURE — 85025 COMPLETE CBC W/AUTO DIFF WBC: CPT | Performed by: FAMILY MEDICINE

## 2017-12-20 RX ORDER — ATORVASTATIN CALCIUM 80 MG/1
40 TABLET, FILM COATED ORAL NIGHTLY
Qty: 45 TABLET | Refills: 3 | Status: SHIPPED | OUTPATIENT
Start: 2017-12-20 | End: 2018-07-06 | Stop reason: SDUPTHER

## 2017-12-20 RX ORDER — RANITIDINE 150 MG/1
150 CAPSULE ORAL EVERY EVENING
Qty: 30 CAPSULE | Refills: 5 | Status: SHIPPED | OUTPATIENT
Start: 2017-12-20 | End: 2018-02-17

## 2017-12-20 NOTE — TELEPHONE ENCOUNTER
Surgery is not scheduled yet. Patient has appointment with urologist in early January and will call us at that time.

## 2017-12-20 NOTE — TELEPHONE ENCOUNTER
If you don't mind to please call the pt or Dr. Hall to confirm the date of surgery, we can check with Dr. Collins re: Plavix hold (prior CVA). Should likely be 5 days pre-op.

## 2017-12-20 NOTE — PROGRESS NOTES
Subjective   Anabell Vera is a 77 y.o. female.     Chief Complaint   Patient presents with   • history of stroke     follow up visit   • Med Refill   • kidney growth     would like to discuss future surgery       Visit Vitals   • /76   • Pulse 70   • Temp 98.7 °F (37.1 °C)   • Wt 60.4 kg (133 lb 3.2 oz)   • LMP  (LMP Unknown)   • SpO2 98%   • BMI 24.36 kg/m2       Hyperlipidemia   This is a chronic problem. The current episode started more than 1 year ago. The problem is controlled. Recent lipid tests were reviewed and are normal. She has no history of chronic renal disease, diabetes, hypothyroidism, liver disease, obesity or nephrotic syndrome. There are no known factors aggravating her hyperlipidemia. Pertinent negatives include no chest pain, focal sensory loss, focal weakness, leg pain, myalgias or shortness of breath. Current antihyperlipidemic treatment includes statins. The current treatment provides significant improvement of lipids. There are no compliance problems.  Risk factors for coronary artery disease include dyslipidemia, hypertension and post-menopausal.        Pt has a solid lesion R upper pole kidney.   Dr Hall's last note discussed removing the right kidney.  Pt is 1 year out from CVA.   Pt has PFO and has seen Dr Collins.   Past Medical History:   Diagnosis Date   • Abdominal pain, epigastric    • Abdominal pain, RLQ (right lower quadrant)    • Diverticulosis of colon    • Easy bruising     on blood thinners   • Esophageal reflux    • Fractures    • Gallstones    • GERD (gastroesophageal reflux disease)    • H/O mammogram 08/12/2016   • Hematuria, microscopic    • High cholesterol    • Hypertension    • Malignant neoplasm of skin    • Pap smear for cervical cancer screening 08/14/2015   • Positive TB test    • Post-cholecystectomy syndrome    • Right kidney mass 2016    found by Dr Cameron Hall   • Stroke    • Thyroid disease    • Thyroid disease        Past Surgical History:   Procedure  Laterality Date   • CHOLECYSTECTOMY  12/04/2012       Allergies   Allergen Reactions   • Amoxicillin    • Penicillins        Family History   Problem Relation Age of Onset   • Arthritis Mother    • Diabetes Mother    • Cancer Father    • Colon cancer Father    • Diabetes Sister    • Heart attack Sister    • Diabetes Brother    • Heart attack Brother    • Cancer Brother    • Colon polyps Brother    • Prostate cancer Brother    • Diabetes Son    • Diabetes Maternal Grandmother    • Cervical cancer Maternal Grandmother    • Cancer Brother      on lips   • Breast cancer Neg Hx    • Ovarian cancer Neg Hx        Social History     Social History   • Marital status:      Spouse name: N/A   • Number of children: N/A   • Years of education: N/A     Occupational History   • Not on file.     Social History Main Topics   • Smoking status: Former Smoker     Years: 2.00     Quit date: 01/1964   • Smokeless tobacco: Never Used      Comment: 2 years only   • Alcohol use Yes      Comment: rarely drinks wine   • Drug use: No   • Sexual activity: Defer     Other Topics Concern   • Not on file     Social History Narrative         The following portions of the patient's history were reviewed and updated as appropriate: allergies, current medications, past family history, past medical history, past social history, past surgical history and problem list.    Past Medical History:   Diagnosis Date   • Abdominal pain, epigastric    • Abdominal pain, RLQ (right lower quadrant)    • Diverticulosis of colon    • Easy bruising     on blood thinners   • Esophageal reflux    • Fractures    • Gallstones    • GERD (gastroesophageal reflux disease)    • H/O mammogram 08/12/2016   • Hematuria, microscopic    • High cholesterol    • Hypertension    • Malignant neoplasm of skin    • Pap smear for cervical cancer screening 08/14/2015   • Positive TB test    • Post-cholecystectomy syndrome    • Right kidney mass 2016    found by Dr Cameron Hall   •  Stroke    • Thyroid disease    • Thyroid disease        Past Surgical History:   Procedure Laterality Date   • CHOLECYSTECTOMY  12/04/2012     Allergies   Allergen Reactions   • Amoxicillin    • Penicillins      Family History   Problem Relation Age of Onset   • Arthritis Mother    • Diabetes Mother    • Cancer Father    • Colon cancer Father    • Diabetes Sister    • Heart attack Sister    • Diabetes Brother    • Heart attack Brother    • Cancer Brother    • Colon polyps Brother    • Prostate cancer Brother    • Diabetes Son    • Diabetes Maternal Grandmother    • Cervical cancer Maternal Grandmother    • Cancer Brother      on lips   • Breast cancer Neg Hx    • Ovarian cancer Neg Hx        Social History   Substance Use Topics   • Smoking status: Former Smoker     Years: 2.00     Quit date: 01/1964   • Smokeless tobacco: Never Used      Comment: 2 years only   • Alcohol use Yes      Comment: rarely drinks wine         Review of Systems   Constitutional: Negative.  Negative for chills, diaphoresis, fatigue and fever.   HENT: Negative.  Negative for ear pain, nosebleeds, postnasal drip, rhinorrhea, sinus pressure, sneezing and sore throat.    Eyes: Negative.  Negative for redness and itching.   Respiratory: Negative.  Negative for cough, shortness of breath and wheezing.    Cardiovascular: Negative.  Negative for chest pain and palpitations.   Gastrointestinal: Negative.  Negative for abdominal pain, constipation, diarrhea, nausea and vomiting.   Endocrine: Negative.  Negative for cold intolerance, heat intolerance, polydipsia and polyuria.   Genitourinary: Negative.  Negative for dysuria, frequency, hematuria and urgency.   Musculoskeletal: Negative.  Negative for arthralgias, back pain, myalgias and neck pain.   Skin: Negative.  Negative for color change and rash.   Allergic/Immunologic: Negative.  Negative for environmental allergies.   Neurological: Negative.  Negative for dizziness, focal weakness, syncope,  light-headedness and headaches.   Hematological: Negative.  Negative for adenopathy. Does not bruise/bleed easily.   Psychiatric/Behavioral: Negative.  Negative for dysphoric mood and sleep disturbance. The patient is not nervous/anxious.        Objective   Physical Exam   Constitutional: She is oriented to person, place, and time. She appears well-developed.   HENT:   Head: Normocephalic.   Right Ear: External ear normal.   Left Ear: External ear normal.   Nose: Nose normal.   Eyes: Conjunctivae and EOM are normal. Pupils are equal, round, and reactive to light.   Neck: Normal range of motion. Neck supple.   Cardiovascular: Normal rate and regular rhythm.    No murmur heard.  Pulmonary/Chest: Effort normal and breath sounds normal. No respiratory distress. She has no decreased breath sounds. She has no wheezes. She has no rhonchi. She has no rales. She exhibits no tenderness.   Abdominal: Soft. Bowel sounds are normal. There is no tenderness.   Musculoskeletal: Normal range of motion.   Neurological: She is alert and oriented to person, place, and time.   Skin: Skin is warm and dry.   Psychiatric: She has a normal mood and affect. Her behavior is normal.   Nursing note and vitals reviewed.      Assessment/Plan   Anabell was seen today for history of stroke, med refill and kidney growth.    Diagnoses and all orders for this visit:    Hyperlipidemia, unspecified hyperlipidemia type  -     atorvastatin (LIPITOR) 80 MG tablet; Take 0.5 tablets by mouth Every Night.  -     Comprehensive Metabolic Panel  -     Lipid Panel    Right kidney mass  -     Amb Referral to  [facility]    Anticoagulant long-term use  -     Amb Referral to  [facility]    Temporary low platelet count  -     CBC & Differential    Gastroesophageal reflux disease with esophagitis  -     ranitidine (ZANTAC) 150 MG capsule; Take 1 capsule by mouth Every Evening.  -     H. Pylori Breath Test - Breath, Lung  -     Ambulatory Referral  to Gastroenterology    Greater than 30 minute spent in face to face time discussing her tumor.  Discussed that most likely Dr Hall would be doing surgery. Discussed difference between Urology(surgeon) and Nephrology.   Advised pt that Dr Collins would need to know about surgery.  Discussed lovenox bridge.               Current Outpatient Prescriptions:   •  aspirin (ASPIRIN LOW DOSE) 81 MG tablet, Take  by mouth daily., Disp: , Rfl:   •  atorvastatin (LIPITOR) 80 MG tablet, Take 0.5 tablets by mouth Every Night., Disp: 45 tablet, Rfl: 3  •  calcium carbonate (TUMS) 500 MG chewable tablet, Chew 1 tablet Daily., Disp: , Rfl:   •  CALCIUM-MAGNESIUM-VITAMIN D PO, Take 1 capsule by mouth Daily. Calcium 600 mg-Magnesium 300-Vitamin D 400 iu, Disp: , Rfl:   •  cholecalciferol (VITAMIN D3) 1000 UNITS tablet, Take 1,000 Units by mouth Daily., Disp: , Rfl:   •  clopidogrel (PLAVIX) 75 MG tablet, TAKE 1 TABLET BY MOUTH DAILY, Disp: 90 tablet, Rfl: 0  •  Multiple Vitamins-Minerals (MULTI VITAMIN/MINERALS PO), Take  by mouth daily., Disp: , Rfl:   •  ramipril (ALTACE) 10 MG capsule, TAKE 1 CAPSULE BY MOUTH DAILY, Disp: 90 capsule, Rfl: 0  •  ranitidine (ZANTAC) 150 MG capsule, Take 1 capsule by mouth Every Evening., Disp: 30 capsule, Rfl: 5    Return in about 3 months (around 3/20/2018), or if symptoms worsen or fail to improve, for Recheck.

## 2017-12-27 LAB
UREA BREATH TEST QL: NEGATIVE
UREA BREATH TEST QL: NORMAL

## 2018-01-05 ENCOUNTER — TELEPHONE (OUTPATIENT)
Dept: INTERNAL MEDICINE | Facility: CLINIC | Age: 78
End: 2018-01-05

## 2018-01-08 ENCOUNTER — TELEPHONE (OUTPATIENT)
Dept: PHARMACY | Facility: HOSPITAL | Age: 78
End: 2018-01-08

## 2018-01-08 NOTE — TELEPHONE ENCOUNTER
Dr. Collins,    Mrs. Vera was referred to our clinic by Dr. Toscano for perioperative anticoagulation. She is currently on clopidogrel and aspirin for history of CVA (10/2016) in the setting of PFO and HTN. She also reports a PMH significant for carotid stenosis (duplex 10/2016 notes both right and left internal carotid artery stenosis of 0-49%).     Mrs. Vera will likely be undergoing a radical nephrectomy with Dr. Cameron Hall ([phone]). Given her cardiac history and the relatively high bleed risk of her upcoming procedure, would you be agreeable to her discontinuing aspirin and clopidogrel x 5-7 days prior to surgery?    Please advise. Our clinic will then fax clearance to Dr. Hall's office( [phone], attn: Ranjana).    Thank you,  Ann Cowden Mayer, PharmD  [facility]

## 2018-01-08 NOTE — TELEPHONE ENCOUNTER
Called patient. Reviewed her results letters. She will keep her gastro appointment. Also she is going to have a procedure and needs to know if it is okay to go off of her aspirin and clopidogrel 5-7 days before her surgery. The anti-coag clinic had told her that they would contact this office and the cardiologist as well.

## 2018-01-08 NOTE — TELEPHONE ENCOUNTER
Considering need of surgery the risk of holding aspirin/Plavix is acceptable and she can hold, restart after the surgery.

## 2018-01-08 NOTE — TELEPHONE ENCOUNTER
Dr. Toscano,    Please see above. Mrs. Vera will hold her aspirin and clopidogrel x5-7 days prior to surgery, without the need for bridge. I have called Mrs. Vera to communicate this plan, and I have faxed clearance to Dr. Hall's office.    Thank you for allowing us to participate in Mrs. Vera's care. Please let us know if you have any questions or need anything else at this time.    Ann Cowden Mayer, PharmD  [facility]

## 2018-01-12 ENCOUNTER — OFFICE VISIT (OUTPATIENT)
Dept: CARDIOLOGY | Facility: CLINIC | Age: 78
End: 2018-01-12

## 2018-01-12 VITALS
HEART RATE: 78 BPM | OXYGEN SATURATION: 98 % | DIASTOLIC BLOOD PRESSURE: 72 MMHG | HEIGHT: 62 IN | WEIGHT: 132 LBS | BODY MASS INDEX: 24.29 KG/M2 | SYSTOLIC BLOOD PRESSURE: 144 MMHG

## 2018-01-12 DIAGNOSIS — I10 ESSENTIAL HYPERTENSION: ICD-10-CM

## 2018-01-12 DIAGNOSIS — Z79.01 ANTICOAGULANT LONG-TERM USE: ICD-10-CM

## 2018-01-12 DIAGNOSIS — I63.9 ACUTE CVA (CEREBROVASCULAR ACCIDENT) (HCC): ICD-10-CM

## 2018-01-12 DIAGNOSIS — Q21.12 PFO (PATENT FORAMEN OVALE): Primary | ICD-10-CM

## 2018-01-12 DIAGNOSIS — E78.2 MIXED HYPERLIPIDEMIA: ICD-10-CM

## 2018-01-12 PROCEDURE — 99214 OFFICE O/P EST MOD 30 MIN: CPT | Performed by: PHYSICIAN ASSISTANT

## 2018-01-12 NOTE — PROGRESS NOTES
Encounter Date:01/12/2018      Patient ID: Anabell Vera is a 77 y.o. female.    Chief Complaint: Patent foramen ovale      PROBLEM LIST:  1. PFO  a. Echocardiogram 10/19/16: EF 65%. Significant a patent PFO.  Mild MR.  b. 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  Positive bubble study.  c. Cardiac event monitor for almost 30 days showing sinus rhythm, no arrhythmias.  2. Acute ischemic stroke  a. Carotid duplex 10/19/16: Right internal carotid 0-49% stenosis.  Left internal carotid 0-49% stenosis.  3. Malignant hypertension  4. Hyperlipidemia    History of Present Illness  Patient presents today for follow-up with a history of PFO and cardiac risk factors.  She is maintaining a very active lifestyle.  States she has run to 5K races since this past summer.  Her blood pressure has general generally been running in the 120s systolic at home and at other medical appointments.  Her primary care physician is following her cholesterol and is favorable on current medical regimen.  She has no current complaint of exertional chest pain or dyspnea and orthopnea no PND no awareness of tachycardia arrhythmias, no dizziness or syncope.  She is compliant with her medications reports no significant side effects.  States that she is having an upcoming nephrectomy for renal mass.  We have already given approval for her to discontinue Plavix briefly for this procedure.    Allergies   Allergen Reactions   • Amoxicillin    • Penicillins          Current Outpatient Prescriptions:   •  aspirin (ASPIRIN LOW DOSE) 81 MG tablet, Take  by mouth daily., Disp: , Rfl:   •  atorvastatin (LIPITOR) 80 MG tablet, Take 0.5 tablets by mouth Every Night., Disp: 45 tablet, Rfl: 3  •  CALCIUM-MAGNESIUM-VITAMIN D PO, Take 1 capsule by mouth Daily. Calcium 600 mg-Magnesium 300-Vitamin D 400 iu, Disp: , Rfl:   •  cholecalciferol (VITAMIN D3) 1000 UNITS tablet, Take 1,000 Units by mouth Daily., Disp: , Rfl:   •   "clopidogrel (PLAVIX) 75 MG tablet, TAKE 1 TABLET BY MOUTH DAILY, Disp: 90 tablet, Rfl: 0  •  Multiple Vitamins-Minerals (MULTI VITAMIN/MINERALS PO), Take  by mouth daily., Disp: , Rfl:   •  ramipril (ALTACE) 10 MG capsule, TAKE 1 CAPSULE BY MOUTH DAILY, Disp: 90 capsule, Rfl: 0  •  ranitidine (ZANTAC) 150 MG capsule, Take 1 capsule by mouth Every Evening., Disp: 30 capsule, Rfl: 5    The following portions of the patient's history were reviewed and updated as appropriate: allergies, current medications, past family history, past medical history, past social history, past surgical history and problem list.    Review of Systems   Constitution: Negative for diaphoresis, weakness, malaise/fatigue and weight gain.   Cardiovascular: Negative for chest pain, claudication, dyspnea on exertion, irregular heartbeat, leg swelling, orthopnea, palpitations, paroxysmal nocturnal dyspnea and syncope.   Respiratory: Negative for cough, shortness of breath, sleep disturbances due to breathing and wheezing.    Hematologic/Lymphatic: Negative for bleeding problem.   Musculoskeletal: Negative for muscle cramps, muscle weakness and myalgias.   Gastrointestinal: Negative for heartburn.         Objective:     Blood pressure 144/72, pulse 78, height 157.5 cm (62\"), weight 59.9 kg (132 lb), SpO2 98 %.        Physical Exam   Constitutional: She is oriented to person, place, and time. She appears well-developed and well-nourished.   Cardiovascular: Normal rate, regular rhythm, normal heart sounds and intact distal pulses.  Exam reveals no gallop and no friction rub.    No murmur heard.  Pulmonary/Chest: Effort normal and breath sounds normal. No respiratory distress. She has no wheezes. She has no rales. She exhibits no tenderness.   Bases clear   Musculoskeletal: Normal range of motion. She exhibits no edema.   Neurological: She is alert and oriented to person, place, and time.           Lab Review:   Total Cholesterol 0 - 200 mg/dL 139 138 " 223 (H) 214 (H) (H)CM     Triglycerides 0 - 150 mg/dL 63 68 108 165 (H)CM 130CM    HDL Cholesterol 40 - 60 mg/dL 47 49 53 47CM 47CM    LDL Cholesterol  0 - 130 mg/dL 77 67 149 (H) 139 (H) (H)CM             Procedures       Assessment:      Diagnosis Plan   1. PFO (patent foramen ovale)  Stable    2. Acute ischemic CVA with resolved right sided deficits  No reoccurrence    3. Essential hypertension  Overall well controlled    4. Mixed hyperlipidemia  Well managed on current medical regimen      Plan:     Continue current medications.   She would like to follow-up with us as needed.  Thank you for allowing us to participate in the care of your patient.     ALVARADO Barone  01/12/2018  1:10 PM      Please note that portions of this note may have been completed with a voice recognition program. Efforts were made to edit the dictations, but occasionally words are mistranscribed.

## 2018-02-02 PROBLEM — Z90.5 SINGLE KIDNEY: Status: ACTIVE | Noted: 2018-01-26

## 2018-02-13 DIAGNOSIS — I63.9 ACUTE CVA (CEREBROVASCULAR ACCIDENT) (HCC): ICD-10-CM

## 2018-02-13 RX ORDER — CLOPIDOGREL BISULFATE 75 MG/1
TABLET ORAL
Qty: 90 TABLET | Refills: 0 | Status: SHIPPED | OUTPATIENT
Start: 2018-02-13 | End: 2018-05-14 | Stop reason: SDUPTHER

## 2018-02-28 RX ORDER — RAMIPRIL 10 MG/1
CAPSULE ORAL
Qty: 90 CAPSULE | Refills: 0 | Status: SHIPPED | OUTPATIENT
Start: 2018-02-28 | End: 2018-05-29 | Stop reason: SDUPTHER

## 2018-03-21 ENCOUNTER — OFFICE VISIT (OUTPATIENT)
Dept: INTERNAL MEDICINE | Facility: CLINIC | Age: 78
End: 2018-03-21

## 2018-03-21 VITALS
OXYGEN SATURATION: 98 % | TEMPERATURE: 97.6 F | DIASTOLIC BLOOD PRESSURE: 70 MMHG | SYSTOLIC BLOOD PRESSURE: 148 MMHG | WEIGHT: 129.4 LBS | BODY MASS INDEX: 23.81 KG/M2 | HEART RATE: 66 BPM

## 2018-03-21 DIAGNOSIS — I10 ESSENTIAL HYPERTENSION: Primary | ICD-10-CM

## 2018-03-21 DIAGNOSIS — Z79.01 ANTICOAGULANT LONG-TERM USE: ICD-10-CM

## 2018-03-21 DIAGNOSIS — E78.2 MIXED HYPERLIPIDEMIA: ICD-10-CM

## 2018-03-21 PROBLEM — K57.30 DIVERTICULOSIS OF LARGE INTESTINE WITHOUT HEMORRHAGE: Status: ACTIVE | Noted: 2018-03-21

## 2018-03-21 PROBLEM — L30.9 ECZEMA: Status: ACTIVE | Noted: 2018-03-21

## 2018-03-21 LAB
ALBUMIN SERPL-MCNC: 4.5 G/DL (ref 3.2–4.8)
ALBUMIN/GLOB SERPL: 2.4 G/DL (ref 1.5–2.5)
ALP SERPL-CCNC: 79 U/L (ref 25–100)
ALT SERPL W P-5'-P-CCNC: 21 U/L (ref 7–40)
ANION GAP SERPL CALCULATED.3IONS-SCNC: 9 MMOL/L (ref 3–11)
ARTICHOKE IGE QN: 62 MG/DL (ref 0–130)
AST SERPL-CCNC: 24 U/L (ref 0–33)
BILIRUB SERPL-MCNC: 0.7 MG/DL (ref 0.3–1.2)
BUN BLD-MCNC: 22 MG/DL (ref 9–23)
BUN/CREAT SERPL: 20 (ref 7–25)
CALCIUM SPEC-SCNC: 9 MG/DL (ref 8.7–10.4)
CHLORIDE SERPL-SCNC: 95 MMOL/L (ref 99–109)
CHOLEST SERPL-MCNC: 129 MG/DL (ref 0–200)
CO2 SERPL-SCNC: 25 MMOL/L (ref 20–31)
CREAT BLD-MCNC: 1.1 MG/DL (ref 0.6–1.3)
GFR SERPL CREATININE-BSD FRML MDRD: 48 ML/MIN/1.73
GLOBULIN UR ELPH-MCNC: 1.9 GM/DL
GLUCOSE BLD-MCNC: 90 MG/DL (ref 70–100)
HDLC SERPL-MCNC: 58 MG/DL (ref 40–60)
POTASSIUM BLD-SCNC: 4.4 MMOL/L (ref 3.5–5.5)
PROT SERPL-MCNC: 6.4 G/DL (ref 5.7–8.2)
SODIUM BLD-SCNC: 129 MMOL/L (ref 132–146)
TRIGL SERPL-MCNC: 79 MG/DL (ref 0–150)

## 2018-03-21 PROCEDURE — 99214 OFFICE O/P EST MOD 30 MIN: CPT | Performed by: FAMILY MEDICINE

## 2018-03-21 PROCEDURE — 80061 LIPID PANEL: CPT | Performed by: FAMILY MEDICINE

## 2018-03-21 PROCEDURE — 80053 COMPREHEN METABOLIC PANEL: CPT | Performed by: FAMILY MEDICINE

## 2018-03-21 RX ORDER — PREDNISONE 20 MG/1
TABLET ORAL
COMMUNITY
Start: 2018-02-17 | End: 2018-03-21

## 2018-03-21 RX ORDER — GABAPENTIN 300 MG/1
CAPSULE ORAL
COMMUNITY
Start: 2018-02-17 | End: 2018-03-21

## 2018-03-21 NOTE — PROGRESS NOTES
Subjective   Anabell Vera is a 78 y.o. female.     Chief Complaint   Patient presents with   • Med Management     3 month follow up       Visit Vitals  /70   Pulse 66   Temp 97.6 °F (36.4 °C)   Wt 58.7 kg (129 lb 6.4 oz)   LMP  (LMP Unknown)   SpO2 98%   BMI 23.81 kg/m²       Hypertension   This is a chronic problem. The current episode started more than 1 year ago. The problem has been waxing and waning since onset. The problem is controlled. Pertinent negatives include no anxiety, blurred vision, chest pain, headaches, malaise/fatigue, neck pain, orthopnea, palpitations, peripheral edema, PND, shortness of breath or sweats. There are no associated agents to hypertension. Risk factors for coronary artery disease include dyslipidemia, family history and post-menopausal state. Past treatments include ACE inhibitors. The current treatment provides significant improvement. There are no compliance problems.  Hypertensive end-organ damage includes kidney disease and CVA. There is no history of angina, CAD/MI, heart failure, left ventricular hypertrophy, PVD, renovascular disease or retinopathy. Identifiable causes of hypertension include chronic renal disease. There is no history of sleep apnea. Current antihypertension treatment includes ACE inhibitors.   Hyperlipidemia   This is a chronic problem. The problem is controlled. Recent lipid tests were reviewed and are normal. Exacerbating diseases include chronic renal disease. She has no history of diabetes, hypothyroidism, liver disease, obesity or nephrotic syndrome. There are no known factors aggravating her hyperlipidemia. Pertinent negatives include no chest pain, focal sensory loss, focal weakness, leg pain, myalgias or shortness of breath. Current antihyperlipidemic treatment includes statins. The current treatment provides significant improvement of lipids. There are no compliance problems.  Risk factors for coronary artery disease include post-menopausal,  hypertension, dyslipidemia and family history.      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   Pt is doing well since surgery.   Pt has seen cardiology and is doing well.  Pt had appt with Dr Randhawa and has been doing well with zantac.  Pt is watching diet and not eating after 7pm. Pt has been able to stop the zantac and Tums after 24 days.     Pt has stopped the prednisone and gabapentin that she was taking for CTS.   Pt was off of plavix for 7 days prior to surgery and then 2 days afterward.     Pt has been checking BP at home and the diastolic is less than 80 and the systolic is in the 120's.     The following portions of the patient's history were reviewed and updated as appropriate: allergies, current medications, past family history, past medical history, past social history, past surgical history and problem list.    Past Medical History:   Diagnosis Date   • Abdominal pain, epigastric    • Abdominal pain, RLQ (right lower quadrant)    • Diverticulosis of colon    • Easy bruising     on blood thinners   • Eczema 3/21/2018   • Esophageal reflux    • Fractures    • Gallstones    • GERD (gastroesophageal reflux disease)    • H/O mammogram 08/12/2016   • Hematuria, microscopic    • High cholesterol    • Hypertension    • Malignant neoplasm of skin    • Pap smear for cervical cancer screening 08/14/2015   • PFO (patent foramen ovale) 10/2016    per echo   • Positive TB test    • Post-cholecystectomy syndrome    • 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   • Right kidney mass 2016    found by Dr Cameron Hall   • Single kidney 01/26/2018   • Stroke 10/2016   • Thyroid disease    • Thyroid disease      Past Surgical History:   Procedure Laterality Date   • CHOLECYSTECTOMY  12/04/2012   • NEPHRECTOMY Right 01/26/2018    Oncocytoma     Allergies   Allergen Reactions   • Amoxicillin Rash   • Penicillins Rash     Family History   Problem Relation Age of Onset   • Arthritis Mother    •  Diabetes Mother    • Cancer Father    • Colon cancer Father    • Diabetes Sister    • Heart attack Sister    • Diabetes Brother    • Heart attack Brother    • Cancer Brother    • Colon polyps Brother    • Prostate cancer Brother    • Diabetes Son    • Diabetes Maternal Grandmother    • Cervical cancer Maternal Grandmother    • Cancer Brother      on lips   • Breast cancer Neg Hx    • Ovarian cancer Neg Hx      Social History     Social History   • Marital status:      Spouse name: N/A   • Number of children: N/A   • Years of education: N/A     Occupational History   • Not on file.     Social History Main Topics   • Smoking status: Former Smoker     Years: 2.00     Quit date: 01/1964   • Smokeless tobacco: Never Used      Comment: 2 years only   • Alcohol use Yes      Comment: rarely drinks wine   • Drug use: No   • Sexual activity: Defer     Other Topics Concern   • Not on file     Social History Narrative   • No narrative on file       Review of Systems   Constitutional: Negative.  Negative for chills, diaphoresis, fatigue, fever and malaise/fatigue.   HENT: Negative.  Negative for ear pain, nosebleeds, postnasal drip, rhinorrhea, sinus pressure, sneezing and sore throat.    Eyes: Negative.  Negative for blurred vision, redness and itching.   Respiratory: Negative.  Negative for cough, shortness of breath and wheezing.    Cardiovascular: Negative.  Negative for chest pain, palpitations, orthopnea and PND.   Gastrointestinal: Negative.  Negative for abdominal pain, constipation, diarrhea, nausea and vomiting.   Endocrine: Negative.  Negative for cold intolerance and heat intolerance.   Genitourinary: Negative.  Negative for dysuria, frequency, hematuria and urgency.   Musculoskeletal: Negative.  Negative for arthralgias, back pain, myalgias and neck pain.   Skin: Negative.  Negative for color change and rash.   Allergic/Immunologic: Negative.  Negative for environmental allergies.   Neurological: Negative.   Negative for dizziness, focal weakness, syncope, light-headedness and headaches.   Hematological: Negative for adenopathy. Bruises/bleeds easily.   Psychiatric/Behavioral: Negative.  Negative for dysphoric mood and sleep disturbance. The patient is not nervous/anxious.        Objective   Physical Exam   Constitutional: She is oriented to person, place, and time. She appears well-developed.   HENT:   Head: Normocephalic.   Right Ear: External ear normal.   Left Ear: External ear normal.   Nose: Nose normal.   Mouth/Throat: Oropharynx is clear and moist.   Eyes: Conjunctivae and EOM are normal. Pupils are equal, round, and reactive to light.   Neck: Trachea normal and normal range of motion. Neck supple. Carotid bruit is not present. No thyroid mass and no thyromegaly present.   Cardiovascular: Normal rate and regular rhythm.    No murmur heard.  Pulmonary/Chest: Effort normal and breath sounds normal. No respiratory distress. She has no decreased breath sounds. She has no wheezes. She has no rhonchi. She has no rales. She exhibits no tenderness.   Abdominal: Soft. Bowel sounds are normal. There is no tenderness.   Musculoskeletal: Normal range of motion.   Neurological: She is alert and oriented to person, place, and time.   Skin: Skin is warm and dry.   Psychiatric: She has a normal mood and affect. Her behavior is normal.   Nursing note and vitals reviewed.      Assessment/Plan   Anabell was seen today for med management.    Diagnoses and all orders for this visit:    Essential hypertension  -     Comprehensive Metabolic Panel  -     Lipid Panel    Mixed hyperlipidemia  -     Comprehensive Metabolic Panel  -     Lipid Panel    Anticoagulant long-term use      Continue regular meds.  Pt has stopped the Gabapentin and prednisone for CTS, symptoms improved             Current Outpatient Prescriptions:   •  aspirin (ASPIRIN LOW DOSE) 81 MG tablet, Take  by mouth daily., Disp: , Rfl:   •  atorvastatin (LIPITOR) 80 MG  tablet, Take 0.5 tablets by mouth Every Night., Disp: 45 tablet, Rfl: 3  •  CALCIUM-MAGNESIUM-VITAMIN D PO, Take 1 capsule by mouth Daily. Calcium 600 mg-Magnesium 300-Vitamin D 400 iu, Disp: , Rfl:   •  cholecalciferol (VITAMIN D3) 1000 UNITS tablet, Take 1,000 Units by mouth Daily., Disp: , Rfl:   •  clopidogrel (PLAVIX) 75 MG tablet, TAKE 1 TABLET BY MOUTH DAILY, Disp: 90 tablet, Rfl: 0  •  Multiple Vitamins-Minerals (MULTI VITAMIN/MINERALS PO), Take  by mouth daily., Disp: , Rfl:   •  ramipril (ALTACE) 10 MG capsule, TAKE 1 CAPSULE BY MOUTH DAILY, Disp: 90 capsule, Rfl: 0    Return in about 3 months (around 6/21/2018), or if symptoms worsen or fail to improve, for Recheck.

## 2018-03-22 ENCOUNTER — TELEPHONE (OUTPATIENT)
Dept: INTERNAL MEDICINE | Facility: CLINIC | Age: 78
End: 2018-03-22

## 2018-03-22 DIAGNOSIS — R94.4 DECREASED GFR: ICD-10-CM

## 2018-03-22 DIAGNOSIS — R79.89 LOW SERUM SODIUM: Primary | ICD-10-CM

## 2018-03-22 NOTE — TELEPHONE ENCOUNTER
----- Message from Alayna PEREZ MD sent at 3/22/2018  9:20 AM EDT -----  Please call the patient regarding her abnormal result. Lipids good. GFR has decreased. Avoid NSAIDs such as Advil or Aleve  Sodium low, loose restriction slightly. Check BMP in about a month.

## 2018-04-23 ENCOUNTER — CLINICAL SUPPORT (OUTPATIENT)
Dept: INTERNAL MEDICINE | Facility: CLINIC | Age: 78
End: 2018-04-23

## 2018-04-23 DIAGNOSIS — R94.4 DECREASED GFR: ICD-10-CM

## 2018-04-23 DIAGNOSIS — R79.89 LOW SERUM SODIUM: ICD-10-CM

## 2018-04-23 LAB
ANION GAP SERPL CALCULATED.3IONS-SCNC: 7 MMOL/L (ref 3–11)
BUN BLD-MCNC: 20 MG/DL (ref 9–23)
BUN/CREAT SERPL: 18.2 (ref 7–25)
CALCIUM SPEC-SCNC: 9.6 MG/DL (ref 8.7–10.4)
CHLORIDE SERPL-SCNC: 103 MMOL/L (ref 99–109)
CO2 SERPL-SCNC: 29 MMOL/L (ref 20–31)
CREAT BLD-MCNC: 1.1 MG/DL (ref 0.6–1.3)
GFR SERPL CREATININE-BSD FRML MDRD: 48 ML/MIN/1.73
GLUCOSE BLD-MCNC: 87 MG/DL (ref 70–100)
POTASSIUM BLD-SCNC: 5 MMOL/L (ref 3.5–5.5)
SODIUM BLD-SCNC: 139 MMOL/L (ref 132–146)

## 2018-04-23 PROCEDURE — 80048 BASIC METABOLIC PNL TOTAL CA: CPT | Performed by: FAMILY MEDICINE

## 2018-05-14 DIAGNOSIS — I63.9 ACUTE CVA (CEREBROVASCULAR ACCIDENT) (HCC): ICD-10-CM

## 2018-05-14 RX ORDER — CLOPIDOGREL BISULFATE 75 MG/1
TABLET ORAL
Qty: 90 TABLET | Refills: 0 | Status: SHIPPED | OUTPATIENT
Start: 2018-05-14 | End: 2018-07-06 | Stop reason: SDUPTHER

## 2018-05-29 RX ORDER — RAMIPRIL 10 MG/1
CAPSULE ORAL
Qty: 90 CAPSULE | Refills: 0 | Status: SHIPPED | OUTPATIENT
Start: 2018-05-29 | End: 2018-07-06 | Stop reason: SDUPTHER

## 2018-07-06 ENCOUNTER — OFFICE VISIT (OUTPATIENT)
Dept: INTERNAL MEDICINE | Facility: CLINIC | Age: 78
End: 2018-07-06

## 2018-07-06 VITALS
SYSTOLIC BLOOD PRESSURE: 146 MMHG | DIASTOLIC BLOOD PRESSURE: 72 MMHG | WEIGHT: 132 LBS | TEMPERATURE: 99.1 F | BODY MASS INDEX: 24.29 KG/M2 | OXYGEN SATURATION: 98 % | HEIGHT: 62 IN

## 2018-07-06 DIAGNOSIS — E78.5 HYPERLIPIDEMIA, UNSPECIFIED HYPERLIPIDEMIA TYPE: ICD-10-CM

## 2018-07-06 DIAGNOSIS — I10 ESSENTIAL HYPERTENSION: ICD-10-CM

## 2018-07-06 DIAGNOSIS — I63.9 ACUTE CVA (CEREBROVASCULAR ACCIDENT) (HCC): ICD-10-CM

## 2018-07-06 DIAGNOSIS — Z90.5 SINGLE KIDNEY: Primary | ICD-10-CM

## 2018-07-06 LAB
ALBUMIN SERPL-MCNC: 4.5 G/DL (ref 3.2–4.8)
ALBUMIN/GLOB SERPL: 2 G/DL (ref 1.5–2.5)
ALP SERPL-CCNC: 70 U/L (ref 25–100)
ALT SERPL W P-5'-P-CCNC: 16 U/L (ref 7–40)
ANION GAP SERPL CALCULATED.3IONS-SCNC: 10 MMOL/L (ref 3–11)
ARTICHOKE IGE QN: 64 MG/DL (ref 0–130)
AST SERPL-CCNC: 23 U/L (ref 0–33)
BILIRUB SERPL-MCNC: 0.6 MG/DL (ref 0.3–1.2)
BUN BLD-MCNC: 20 MG/DL (ref 9–23)
BUN/CREAT SERPL: 17.4 (ref 7–25)
CALCIUM SPEC-SCNC: 9.5 MG/DL (ref 8.7–10.4)
CHLORIDE SERPL-SCNC: 102 MMOL/L (ref 99–109)
CHOLEST SERPL-MCNC: 135 MG/DL (ref 0–200)
CO2 SERPL-SCNC: 26 MMOL/L (ref 20–31)
CREAT BLD-MCNC: 1.15 MG/DL (ref 0.6–1.3)
GFR SERPL CREATININE-BSD FRML MDRD: 46 ML/MIN/1.73
GLOBULIN UR ELPH-MCNC: 2.2 GM/DL
GLUCOSE BLD-MCNC: 92 MG/DL (ref 70–100)
HDLC SERPL-MCNC: 52 MG/DL (ref 40–60)
POTASSIUM BLD-SCNC: 4.4 MMOL/L (ref 3.5–5.5)
PROT SERPL-MCNC: 6.7 G/DL (ref 5.7–8.2)
SODIUM BLD-SCNC: 138 MMOL/L (ref 132–146)
TRIGL SERPL-MCNC: 107 MG/DL (ref 0–150)

## 2018-07-06 PROCEDURE — 80061 LIPID PANEL: CPT | Performed by: FAMILY MEDICINE

## 2018-07-06 PROCEDURE — 80053 COMPREHEN METABOLIC PANEL: CPT | Performed by: FAMILY MEDICINE

## 2018-07-06 PROCEDURE — 99214 OFFICE O/P EST MOD 30 MIN: CPT | Performed by: FAMILY MEDICINE

## 2018-07-06 RX ORDER — CLOPIDOGREL BISULFATE 75 MG/1
75 TABLET ORAL DAILY
Qty: 90 TABLET | Refills: 1 | Status: SHIPPED | OUTPATIENT
Start: 2018-07-06 | End: 2019-01-16 | Stop reason: SDUPTHER

## 2018-07-06 RX ORDER — RAMIPRIL 10 MG/1
10 CAPSULE ORAL DAILY
Qty: 90 CAPSULE | Refills: 1 | Status: SHIPPED | OUTPATIENT
Start: 2018-07-06 | End: 2019-01-16 | Stop reason: SDUPTHER

## 2018-07-06 RX ORDER — FLUTICASONE PROPIONATE 50 MCG
2 SPRAY, SUSPENSION (ML) NASAL DAILY
COMMUNITY

## 2018-07-06 RX ORDER — ATORVASTATIN CALCIUM 80 MG/1
40 TABLET, FILM COATED ORAL NIGHTLY
Qty: 45 TABLET | Refills: 3 | Status: SHIPPED | OUTPATIENT
Start: 2018-07-06 | End: 2019-01-16 | Stop reason: SDUPTHER

## 2018-07-06 NOTE — PROGRESS NOTES
"Subjective   Anabell Vera is a 78 y.o. female.     Chief Complaint   Patient presents with   • Follow-up     Med management   • Hyperlipidemia   • Hypertension       Visit Vitals  /72   Temp 99.1 °F (37.3 °C) (Temporal Artery )   Ht 157 cm (61.81\")   Wt 59.9 kg (132 lb)   LMP  (LMP Unknown)   SpO2 98%   BMI 24.29 kg/m²         Hyperlipidemia   This is a chronic problem. The current episode started more than 1 year ago. The problem is controlled. Recent lipid tests were reviewed and are normal. She has no history of chronic renal disease, diabetes, hypothyroidism, liver disease, obesity or nephrotic syndrome. There are no known factors aggravating her hyperlipidemia. Pertinent negatives include no chest pain, focal sensory loss, focal weakness, leg pain, myalgias or shortness of breath. Current antihyperlipidemic treatment includes statins. The current treatment provides significant improvement of lipids. There are no compliance problems.  Risk factors for coronary artery disease include dyslipidemia, family history and post-menopausal.   Hypertension   This is a chronic problem. The current episode started more than 1 year ago. The problem is unchanged. The problem is controlled. Pertinent negatives include no anxiety, blurred vision, chest pain, headaches, malaise/fatigue, neck pain, orthopnea, palpitations, peripheral edema, PND, shortness of breath or sweats. There are no associated agents to hypertension. Risk factors for coronary artery disease include dyslipidemia and family history. Current antihypertension treatment includes ACE inhibitors. The current treatment provides significant improvement. Hypertensive end-organ damage includes kidney disease. There is no history of angina, CAD/MI, CVA, heart failure, left ventricular hypertrophy, PVD or retinopathy. There is no history of chronic renal disease, sleep apnea or a thyroid problem.        The following portions of the patient's history were reviewed " and updated as appropriate: allergies, current medications, past family history, past medical history, past social history, past surgical history and problem list.    Past Medical History:   Diagnosis Date   • Abdominal pain, epigastric    • Abdominal pain, RLQ (right lower quadrant)    • Diverticulosis of colon    • Easy bruising     on blood thinners   • Eczema 3/21/2018   • Esophageal reflux    • Fractures    • Gallstones    • GERD (gastroesophageal reflux disease)    • H/O mammogram 08/12/2016   • Hematuria, microscopic    • High cholesterol    • Hypertension    • Malignant neoplasm of skin    • Pap smear for cervical cancer screening 08/14/2015   • PFO (patent foramen ovale) 10/2016    per echo   • Positive TB test    • Post-cholecystectomy syndrome    • 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   • Right kidney mass 2016    found by Dr Cameron Hall   • Single kidney 01/26/2018   • Stroke (CMS/HCC) 10/2016   • Thyroid disease    • Thyroid disease       Past Surgical History:   Procedure Laterality Date   • CHOLECYSTECTOMY  12/04/2012   • NEPHRECTOMY Right 01/26/2018    Oncocytoma      Family History   Problem Relation Age of Onset   • Arthritis Mother    • Diabetes Mother    • Cancer Father    • Colon cancer Father    • Diabetes Sister    • Heart attack Sister    • Diabetes Brother    • Heart attack Brother    • Cancer Brother    • Colon polyps Brother    • Prostate cancer Brother    • Diabetes Son    • Diabetes Maternal Grandmother    • Cervical cancer Maternal Grandmother    • Cancer Brother         on lips   • Breast cancer Neg Hx    • Ovarian cancer Neg Hx       Social History     Social History   • Marital status:      Spouse name: N/A   • Number of children: N/A   • Years of education: N/A     Occupational History   • Not on file.     Social History Main Topics   • Smoking status: Former Smoker     Years: 2.00     Quit date: 01/1964   • Smokeless tobacco: Never Used      Comment: 2 years only   •  Alcohol use Yes      Comment: rarely drinks wine   • Drug use: No   • Sexual activity: Defer     Other Topics Concern   • Not on file     Social History Narrative   • No narrative on file        Review of Systems   Constitutional: Negative.  Negative for chills, diaphoresis, fatigue, fever and malaise/fatigue.   HENT: Positive for rhinorrhea (using [place]). Negative for ear pain, nosebleeds, postnasal drip, sinus pressure, sneezing and sore throat.    Eyes: Negative.  Negative for blurred vision, redness and itching.   Respiratory: Negative.  Negative for cough, shortness of breath and wheezing.    Cardiovascular: Negative.  Negative for chest pain, palpitations, orthopnea and PND.   Gastrointestinal: Negative.  Negative for abdominal pain, constipation, diarrhea, nausea and vomiting.   Endocrine: Negative.  Negative for cold intolerance and heat intolerance.   Genitourinary: Negative.  Negative for dysuria, frequency, hematuria and urgency.   Musculoskeletal: Negative.  Negative for arthralgias, back pain, myalgias and neck pain.   Skin: Negative.  Negative for color change and rash.   Allergic/Immunologic: Positive for environmental allergies.   Neurological: Negative.  Negative for dizziness, focal weakness, syncope, light-headedness and headaches.   Hematological: Negative for adenopathy. Bruises/bleeds easily.   Psychiatric/Behavioral: Negative.  Negative for dysphoric mood. The patient is not nervous/anxious.        Objective   Physical Exam   Constitutional: She is oriented to person, place, and time. She appears well-developed.   HENT:   Head: Normocephalic.   Right Ear: External ear normal.   Left Ear: External ear normal.   Nose: Nose normal.   Eyes: Conjunctivae, EOM and lids are normal. Pupils are equal, round, and reactive to light.   Neck: Trachea normal and normal range of motion. Neck supple. Carotid bruit is not present. No thyroid mass and no thyromegaly present.   Cardiovascular: Normal rate  and regular rhythm.    No murmur heard.  Pulmonary/Chest: Effort normal and breath sounds normal. No respiratory distress. She has no decreased breath sounds. She has no wheezes. She has no rhonchi. She has no rales. She exhibits no tenderness.   Abdominal: Soft. Bowel sounds are normal. There is no tenderness.   Musculoskeletal: Normal range of motion.   Neurological: She is alert and oriented to person, place, and time.   Skin: Skin is warm and dry.   Psychiatric: She has a normal mood and affect. Her behavior is normal.   Nursing note and vitals reviewed.      Assessment/Plan   Anabell was seen today for follow-up, hyperlipidemia and hypertension.    Diagnoses and all orders for this visit:    Single kidney    Acute ischemic CVA with resolved right sided deficits  -     clopidogrel (PLAVIX) 75 MG tablet; Take 1 tablet by mouth Daily.    Hyperlipidemia, unspecified hyperlipidemia type  -     atorvastatin (LIPITOR) 80 MG tablet; Take 0.5 tablets by mouth Every Night.  -     Comprehensive Metabolic Panel  -     Lipid Panel    Essential hypertension  -     ramipril (ALTACE) 10 MG capsule; Take 1 capsule by mouth Daily.  -     Comprehensive Metabolic Panel  -     Lipid Panel        Pt checking BP at home and the reading is better than it is here per pt.            Current Outpatient Prescriptions:   •  atorvastatin (LIPITOR) 80 MG tablet, Take 0.5 tablets by mouth Every Night., Disp: 45 tablet, Rfl: 3  •  CALCIUM-MAGNESIUM-VITAMIN D PO, Take 1 capsule by mouth Daily. Calcium 600 mg-Magnesium 300-Vitamin D 400 iu, Disp: , Rfl:   •  cholecalciferol (VITAMIN D3) 1000 UNITS tablet, Take 1,000 Units by mouth Daily., Disp: , Rfl:   •  clopidogrel (PLAVIX) 75 MG tablet, Take 1 tablet by mouth Daily., Disp: 90 tablet, Rfl: 1  •  fluticasone (FLONASE) 50 MCG/ACT nasal spray, 2 sprays into each nostril Daily., Disp: , Rfl:   •  Multiple Vitamins-Minerals (MULTI VITAMIN/MINERALS PO), Take  by mouth daily., Disp: , Rfl:   •   ramipril (ALTACE) 10 MG capsule, Take 1 capsule by mouth Daily., Disp: 90 capsule, Rfl: 1    Return in about 6 months (around 1/6/2019), or if symptoms worsen or fail to improve, for Recheck.

## 2018-10-02 ENCOUNTER — TELEPHONE (OUTPATIENT)
Dept: PHARMACY | Facility: HOSPITAL | Age: 78
End: 2018-10-02

## 2018-10-02 NOTE — TELEPHONE ENCOUNTER
Per Fidelia Noyola PharmD and Yoly Collins MD conversation on 1/18/18, patient only needed education and bridging

## 2018-11-28 ENCOUNTER — TELEPHONE (OUTPATIENT)
Dept: INTERNAL MEDICINE | Facility: CLINIC | Age: 78
End: 2018-11-28

## 2018-12-04 ENCOUNTER — TRANSCRIBE ORDERS (OUTPATIENT)
Dept: ADMINISTRATIVE | Facility: HOSPITAL | Age: 78
End: 2018-12-04

## 2018-12-04 DIAGNOSIS — Z12.31 VISIT FOR SCREENING MAMMOGRAM: Primary | ICD-10-CM

## 2018-12-12 DIAGNOSIS — E78.5 HYPERLIPIDEMIA, UNSPECIFIED HYPERLIPIDEMIA TYPE: ICD-10-CM

## 2018-12-12 RX ORDER — ATORVASTATIN CALCIUM 80 MG/1
TABLET, FILM COATED ORAL
Qty: 45 TABLET | Refills: 0 | Status: SHIPPED | OUTPATIENT
Start: 2018-12-12 | End: 2018-12-19

## 2018-12-19 ENCOUNTER — OFFICE VISIT (OUTPATIENT)
Dept: INTERNAL MEDICINE | Facility: CLINIC | Age: 78
End: 2018-12-19

## 2018-12-19 VITALS
BODY MASS INDEX: 24.25 KG/M2 | HEART RATE: 67 BPM | WEIGHT: 131.8 LBS | SYSTOLIC BLOOD PRESSURE: 158 MMHG | DIASTOLIC BLOOD PRESSURE: 68 MMHG | HEIGHT: 62 IN | OXYGEN SATURATION: 98 % | TEMPERATURE: 99.4 F

## 2018-12-19 DIAGNOSIS — L30.9 DERMATITIS: Primary | ICD-10-CM

## 2018-12-19 PROCEDURE — 99213 OFFICE O/P EST LOW 20 MIN: CPT | Performed by: FAMILY MEDICINE

## 2018-12-19 PROCEDURE — 96372 THER/PROPH/DIAG INJ SC/IM: CPT | Performed by: FAMILY MEDICINE

## 2018-12-19 RX ORDER — METHYLPREDNISOLONE SODIUM SUCCINATE 40 MG/ML
40 INJECTION, POWDER, LYOPHILIZED, FOR SOLUTION INTRAMUSCULAR; INTRAVENOUS ONCE
Status: DISCONTINUED | OUTPATIENT
Start: 2018-12-19 | End: 2018-12-19

## 2018-12-19 RX ORDER — DEXAMETHASONE SODIUM PHOSPHATE 4 MG/ML
4 INJECTION, SOLUTION INTRA-ARTICULAR; INTRALESIONAL; INTRAMUSCULAR; INTRAVENOUS; SOFT TISSUE ONCE
Status: COMPLETED | OUTPATIENT
Start: 2018-12-19 | End: 2018-12-19

## 2018-12-19 RX ORDER — METHYLPREDNISOLONE ACETATE 40 MG/ML
40 INJECTION, SUSPENSION INTRA-ARTICULAR; INTRALESIONAL; INTRAMUSCULAR; SOFT TISSUE ONCE
Status: COMPLETED | OUTPATIENT
Start: 2018-12-19 | End: 2018-12-19

## 2018-12-19 RX ADMIN — DEXAMETHASONE SODIUM PHOSPHATE 4 MG: 4 INJECTION, SOLUTION INTRA-ARTICULAR; INTRALESIONAL; INTRAMUSCULAR; INTRAVENOUS; SOFT TISSUE at 16:34

## 2018-12-19 RX ADMIN — METHYLPREDNISOLONE ACETATE 40 MG: 40 INJECTION, SUSPENSION INTRA-ARTICULAR; INTRALESIONAL; INTRAMUSCULAR; SOFT TISSUE at 16:43

## 2018-12-19 NOTE — PROGRESS NOTES
"Subjective   Anabell Vera is a 78 y.o. female.     Chief Complaint   Patient presents with   • Rash     shoulders, back, buttocks and thighs. Pt states this has been going on for 3 weeks. Very itchy and has been getting worse. Has used hydrocortisone cream, triamcinolone ointment with some relief but comes back. Just switched from all free and clear liquid softner to dryer sheets.       Visit Vitals  /68 (BP Location: Right arm, Patient Position: Sitting)   Pulse 67   Temp 99.4 °F (37.4 °C)   Ht 157.5 cm (62\")   Wt 59.8 kg (131 lb 12.8 oz)   LMP  (LMP Unknown)   SpO2 98%   BMI 24.11 kg/m²         Rash   This is a chronic problem. The current episode started 1 to 4 weeks ago (3 weeks). The affected locations include the left buttock, left upper leg, right buttock, right upper leg, left shoulder and right shoulder (sparing or chest and abdomen). The rash is characterized by dryness, itchiness, redness and scaling. Associated with: yard work. Pertinent negatives include no anorexia, congestion, cough, diarrhea, eye pain, facial edema, fatigue, fever, joint pain, nail changes, rhinorrhea, shortness of breath, sore throat or vomiting. Past treatments include topical steroids and anti-itch cream. The treatment provided no relief. Her past medical history is significant for allergies and eczema. There is no history of asthma or varicella.        The following portions of the patient's history were reviewed and updated as appropriate: allergies, current medications, past family history, past medical history, past social history, past surgical history and problem list.    Past Medical History:   Diagnosis Date   • Abdominal pain, epigastric    • Abdominal pain, RLQ (right lower quadrant)    • Diverticulosis of colon    • Easy bruising     on blood thinners   • Eczema 3/21/2018   • Esophageal reflux    • Fractures    • Gallstones    • GERD (gastroesophageal reflux disease)    • H/O mammogram 08/12/2016   • Hematuria, " microscopic    • High cholesterol    • Hypertension    • Malignant neoplasm of skin    • Pap smear for cervical cancer screening 2015   • PFO (patent foramen ovale) 10/2016    per echo   • Positive TB test    • Post-cholecystectomy syndrome    • Renal oncocytoma of right kidney 2018   • Right kidney mass 2016    found by Dr Cameron Hall   • Single kidney 2018   • Stroke (CMS/HCC) 10/2016   • Thyroid disease    • Thyroid disease       Past Surgical History:   Procedure Laterality Date   • CHOLECYSTECTOMY  2012   • NEPHRECTOMY Right 2018    Oncocytoma      Family History   Problem Relation Age of Onset   • Arthritis Mother    • Diabetes Mother    • Cancer Father    • Colon cancer Father    • Diabetes Sister    • Heart attack Sister    • Diabetes Brother    • Heart attack Brother    • Cancer Brother    • Colon polyps Brother    • Prostate cancer Brother    • Diabetes Son    • Diabetes Maternal Grandmother    • Cervical cancer Maternal Grandmother    • Cancer Brother         on lips   • Breast cancer Neg Hx    • Ovarian cancer Neg Hx       Social History     Socioeconomic History   • Marital status:      Spouse name: Not on file   • Number of children: Not on file   • Years of education: Not on file   • Highest education level: Not on file   Social Needs   • Financial resource strain: Not on file   • Food insecurity - worry: Not on file   • Food insecurity - inability: Not on file   • Transportation needs - medical: Not on file   • Transportation needs - non-medical: Not on file   Occupational History   • Not on file   Tobacco Use   • Smoking status: Former Smoker     Years: 2.00     Last attempt to quit: 1964     Years since quittin.0   • Smokeless tobacco: Never Used   • Tobacco comment: 2 years only   Substance and Sexual Activity   • Alcohol use: Yes     Comment: rarely drinks wine   • Drug use: No   • Sexual activity: Defer   Other Topics Concern   • Not on file   Social  History Narrative   • Not on file        Review of Systems   Constitutional: Negative.  Negative for chills, diaphoresis, fatigue and fever.   HENT: Negative.  Negative for congestion, ear pain, nosebleeds, postnasal drip, rhinorrhea, sinus pressure, sneezing and sore throat.    Eyes: Negative.  Negative for pain, redness and itching.   Respiratory: Negative.  Negative for cough, shortness of breath and wheezing.    Cardiovascular: Negative.  Negative for chest pain and palpitations.   Gastrointestinal: Negative.  Negative for abdominal pain, anorexia, constipation, diarrhea, nausea and vomiting.   Endocrine: Negative.  Negative for cold intolerance and heat intolerance.   Genitourinary: Negative.  Negative for dysuria, frequency, hematuria and urgency.   Musculoskeletal: Negative.  Negative for arthralgias, back pain, joint pain and neck pain.   Skin: Positive for rash. Negative for color change and nail changes.   Allergic/Immunologic: Positive for environmental allergies.   Neurological: Negative.  Negative for dizziness, syncope, light-headedness and headaches.   Hematological: Negative.  Negative for adenopathy. Does not bruise/bleed easily.   Psychiatric/Behavioral: Negative.  Negative for dysphoric mood. The patient is not nervous/anxious.        Objective   Physical Exam   Constitutional: She is oriented to person, place, and time. She appears well-developed.   HENT:   Head: Normocephalic.   Right Ear: External ear normal.   Left Ear: External ear normal.   Nose: Nose normal.   Eyes: Conjunctivae, EOM and lids are normal. Pupils are equal, round, and reactive to light.   Neck: Trachea normal and normal range of motion. Neck supple. Carotid bruit is not present. No thyroid mass and no thyromegaly present.   Cardiovascular: Normal rate and regular rhythm.   No murmur heard.  Pulmonary/Chest: Effort normal and breath sounds normal. No respiratory distress. She has no decreased breath sounds. She has no wheezes.  She has no rhonchi. She has no rales. She exhibits no tenderness.   Abdominal: Soft. Bowel sounds are normal. There is no tenderness.   Musculoskeletal: Normal range of motion.   Neurological: She is alert and oriented to person, place, and time.   Skin: Skin is warm and dry. Rash noted.   Macular patches that are erythematous, more on shoulders, upper back and thighs.  Sparing of ant chest and abdomin and web spaces of digits.   Psychiatric: She has a normal mood and affect. Her behavior is normal.   Nursing note and vitals reviewed.      Assessment/Plan   Anabell was seen today for rash.    Diagnoses and all orders for this visit:    Dermatitis  -     dexamethasone (DECADRON) injection 4 mg; Inject 1 mL into the appropriate muscle as directed by prescriber 1 (One) Time.  -     Discontinue: methylPREDNISolone sodium succinate (SOLU-Medrol) injection 40 mg; Inject 1 mL into the appropriate muscle as directed by prescriber 1 (One) Time.  -     methylPREDNISolone acetate (DEPO-medrol) injection 40 mg; Inject 1 mL into the appropriate muscle as directed by prescriber 1 (One) Time.      Watch BP             Current Outpatient Medications:   •  atorvastatin (LIPITOR) 80 MG tablet, Take 0.5 tablets by mouth Every Night., Disp: 45 tablet, Rfl: 3  •  CALCIUM-MAGNESIUM-VITAMIN D PO, Take 1 capsule by mouth Daily. Calcium 600 mg-Magnesium 300-Vitamin D 400 iu, Disp: , Rfl:   •  clopidogrel (PLAVIX) 75 MG tablet, Take 1 tablet by mouth Daily., Disp: 90 tablet, Rfl: 1  •  fluticasone (FLONASE) 50 MCG/ACT nasal spray, 2 sprays into each nostril Daily., Disp: , Rfl:   •  Multiple Vitamins-Minerals (MULTI VITAMIN/MINERALS PO), Take  by mouth daily., Disp: , Rfl:   •  ramipril (ALTACE) 10 MG capsule, Take 1 capsule by mouth Daily., Disp: 90 capsule, Rfl: 1  No current facility-administered medications for this visit.     Return if symptoms worsen or fail to improve, for Recheck.

## 2019-01-10 ENCOUNTER — HOSPITAL ENCOUNTER (OUTPATIENT)
Dept: MAMMOGRAPHY | Facility: HOSPITAL | Age: 79
Discharge: HOME | End: 2019-01-10
Admitting: FAMILY MEDICINE

## 2019-01-10 DIAGNOSIS — Z12.31 VISIT FOR SCREENING MAMMOGRAM: ICD-10-CM

## 2019-01-10 PROCEDURE — 77067 SCR MAMMO BI INCL CAD: CPT

## 2019-01-10 PROCEDURE — 77067 SCR MAMMO BI INCL CAD: CPT | Performed by: RADIOLOGY

## 2019-01-10 PROCEDURE — 77063 BREAST TOMOSYNTHESIS BI: CPT

## 2019-01-10 PROCEDURE — 77063 BREAST TOMOSYNTHESIS BI: CPT | Performed by: RADIOLOGY

## 2019-01-16 ENCOUNTER — OFFICE VISIT (OUTPATIENT)
Dept: INTERNAL MEDICINE | Facility: CLINIC | Age: 79
End: 2019-01-16

## 2019-01-16 VITALS
WEIGHT: 131 LBS | HEART RATE: 63 BPM | BODY MASS INDEX: 24.11 KG/M2 | OXYGEN SATURATION: 98 % | TEMPERATURE: 99.3 F | DIASTOLIC BLOOD PRESSURE: 68 MMHG | HEIGHT: 62 IN | SYSTOLIC BLOOD PRESSURE: 142 MMHG

## 2019-01-16 DIAGNOSIS — Z90.5 SINGLE KIDNEY: Primary | ICD-10-CM

## 2019-01-16 DIAGNOSIS — I63.9 ACUTE CVA (CEREBROVASCULAR ACCIDENT) (HCC): ICD-10-CM

## 2019-01-16 DIAGNOSIS — E78.2 MIXED HYPERLIPIDEMIA: ICD-10-CM

## 2019-01-16 DIAGNOSIS — E78.5 HYPERLIPIDEMIA, UNSPECIFIED HYPERLIPIDEMIA TYPE: ICD-10-CM

## 2019-01-16 DIAGNOSIS — N18.30 CKD (CHRONIC KIDNEY DISEASE) STAGE 3, GFR 30-59 ML/MIN (HCC): ICD-10-CM

## 2019-01-16 DIAGNOSIS — I10 ESSENTIAL HYPERTENSION: ICD-10-CM

## 2019-01-16 LAB
ALBUMIN SERPL-MCNC: 4.68 G/DL (ref 3.2–4.8)
ALBUMIN/GLOB SERPL: 2.4 G/DL (ref 1.5–2.5)
ALP SERPL-CCNC: 72 U/L (ref 25–100)
ALT SERPL W P-5'-P-CCNC: 26 U/L (ref 7–40)
ANION GAP SERPL CALCULATED.3IONS-SCNC: 9 MMOL/L (ref 3–11)
ARTICHOKE IGE QN: 58 MG/DL (ref 0–130)
AST SERPL-CCNC: 28 U/L (ref 0–33)
BACTERIA UR QL AUTO: ABNORMAL /HPF
BILIRUB SERPL-MCNC: 0.7 MG/DL (ref 0.3–1.2)
BILIRUB UR QL STRIP: NEGATIVE
BUN BLD-MCNC: 16 MG/DL (ref 9–23)
BUN/CREAT SERPL: 14 (ref 7–25)
CALCIUM SPEC-SCNC: 9.4 MG/DL (ref 8.7–10.4)
CHLORIDE SERPL-SCNC: 95 MMOL/L (ref 99–109)
CHOLEST SERPL-MCNC: 133 MG/DL (ref 0–200)
CLARITY UR: CLEAR
CO2 SERPL-SCNC: 26 MMOL/L (ref 20–31)
COLOR UR: YELLOW
CREAT BLD-MCNC: 1.14 MG/DL (ref 0.6–1.3)
GFR SERPL CREATININE-BSD FRML MDRD: 46 ML/MIN/1.73
GLOBULIN UR ELPH-MCNC: 1.9 GM/DL
GLUCOSE BLD-MCNC: 92 MG/DL (ref 70–100)
GLUCOSE UR STRIP-MCNC: NEGATIVE MG/DL
HDLC SERPL-MCNC: 57 MG/DL (ref 40–60)
HGB UR QL STRIP.AUTO: ABNORMAL
HYALINE CASTS UR QL AUTO: ABNORMAL /LPF
KETONES UR QL STRIP: NEGATIVE
LEUKOCYTE ESTERASE UR QL STRIP.AUTO: NEGATIVE
NITRITE UR QL STRIP: NEGATIVE
PH UR STRIP.AUTO: 6.5 [PH] (ref 5–8)
POTASSIUM BLD-SCNC: 4.3 MMOL/L (ref 3.5–5.5)
PROT SERPL-MCNC: 6.6 G/DL (ref 5.7–8.2)
PROT UR QL STRIP: NEGATIVE
RBC # UR: ABNORMAL /HPF
REF LAB TEST METHOD: ABNORMAL
SODIUM BLD-SCNC: 130 MMOL/L (ref 132–146)
SP GR UR STRIP: 1.01 (ref 1–1.03)
SQUAMOUS #/AREA URNS HPF: ABNORMAL /HPF
TRIGL SERPL-MCNC: 100 MG/DL (ref 0–150)
UROBILINOGEN UR QL STRIP: ABNORMAL
WBC UR QL AUTO: ABNORMAL /HPF

## 2019-01-16 PROCEDURE — 80061 LIPID PANEL: CPT | Performed by: FAMILY MEDICINE

## 2019-01-16 PROCEDURE — 80053 COMPREHEN METABOLIC PANEL: CPT | Performed by: FAMILY MEDICINE

## 2019-01-16 PROCEDURE — 99214 OFFICE O/P EST MOD 30 MIN: CPT | Performed by: FAMILY MEDICINE

## 2019-01-16 PROCEDURE — 81001 URINALYSIS AUTO W/SCOPE: CPT | Performed by: FAMILY MEDICINE

## 2019-01-16 RX ORDER — ATORVASTATIN CALCIUM 80 MG/1
40 TABLET, FILM COATED ORAL NIGHTLY
Qty: 45 TABLET | Refills: 3 | Status: SHIPPED | OUTPATIENT
Start: 2019-01-16 | End: 2019-08-19 | Stop reason: SDUPTHER

## 2019-01-16 RX ORDER — RAMIPRIL 10 MG/1
10 CAPSULE ORAL DAILY
Qty: 90 CAPSULE | Refills: 1 | Status: SHIPPED | OUTPATIENT
Start: 2019-01-16 | End: 2019-08-19 | Stop reason: SDUPTHER

## 2019-01-16 RX ORDER — CLOPIDOGREL BISULFATE 75 MG/1
75 TABLET ORAL DAILY
Qty: 90 TABLET | Refills: 1 | Status: SHIPPED | OUTPATIENT
Start: 2019-01-16 | End: 2019-08-12 | Stop reason: SDUPTHER

## 2019-01-16 NOTE — PROGRESS NOTES
"Subjective   Anabell Vera is a 78 y.o. female.     Chief Complaint   Patient presents with   • discuss kidney function   • Rash     after learning she was allergic to wheat she was breaking out and wanted to have her moles looked at since she scratched them       Visit Vitals  /68   Pulse 63   Temp 99.3 °F (37.4 °C)   Ht 157.5 cm (62\")   Wt 59.4 kg (131 lb)   LMP  (LMP Unknown)   SpO2 98%   BMI 23.96 kg/m²         Rash   This is a new problem. The current episode started 1 to 4 weeks ago. The problem has been resolved since onset. The affected locations include the right upper leg and back. The rash is characterized by itchiness. Associated with: wheat. Pertinent negatives include no anorexia, congestion, cough, diarrhea, eye pain, facial edema, fatigue, fever, joint pain, nail changes, rhinorrhea, shortness of breath, sore throat or vomiting. Past treatments include oral steroids. The treatment provided significant relief. Her past medical history is significant for allergies and eczema. There is no history of asthma or varicella.   Hypertension   This is a chronic problem. The problem is unchanged. The problem is uncontrolled. Pertinent negatives include no chest pain, headaches, neck pain, palpitations or shortness of breath. There are no associated agents to hypertension. Risk factors for coronary artery disease include dyslipidemia, post-menopausal state and family history. Current antihypertension treatment includes ACE inhibitors. The current treatment provides moderate improvement. There are no compliance problems.  Hypertensive end-organ damage includes kidney disease and CVA. There is no history of angina, CAD/MI, heart failure, left ventricular hypertrophy, PVD or retinopathy. Identifiable causes of hypertension include chronic renal disease. There is no history of sleep apnea or a thyroid problem.   Hyperlipidemia   This is a chronic problem. The current episode started more than 1 year ago. The " problem is controlled. Recent lipid tests were reviewed and are normal. Exacerbating diseases include chronic renal disease. She has no history of diabetes, hypothyroidism, liver disease, obesity or nephrotic syndrome. There are no known factors aggravating her hyperlipidemia. Pertinent negatives include no chest pain, focal sensory loss, focal weakness, leg pain, myalgias or shortness of breath. Current antihyperlipidemic treatment includes statins. The current treatment provides significant improvement of lipids. There are no compliance problems.  Risk factors for coronary artery disease include dyslipidemia, hypertension, post-menopausal and family history.      Pt had flare of rash after eating wheat noodles.   Rash is better. Pt scratched her back and wants her moles checked on back.  Pt is due to see Dermatology.  Pt scratched a sty on her lower right lid and irritated it.    The following portions of the patient's history were reviewed and updated as appropriate: allergies, current medications, past family history, past medical history, past social history, past surgical history and problem list.    Past Medical History:   Diagnosis Date   • Abdominal pain, epigastric    • Abdominal pain, RLQ (right lower quadrant)    • Diverticulosis of colon    • Easy bruising     on blood thinners   • Eczema 3/21/2018   • Esophageal reflux    • Fractures    • Gallstones    • GERD (gastroesophageal reflux disease)    • H/O mammogram 08/12/2016   • Hematuria, microscopic    • High cholesterol    • Hypertension    • Malignant neoplasm of skin    • Pap smear for cervical cancer screening 08/14/2015   • PFO (patent foramen ovale) 10/2016    per echo   • Positive TB test    • Post-cholecystectomy syndrome    • 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   • Right kidney mass 2016    found by Dr Cameron Hall   • Single kidney 01/26/2018   • Stroke (CMS/HCC) 10/2016   • Thyroid disease    • Thyroid disease       Past Surgical  History:   Procedure Laterality Date   • CHOLECYSTECTOMY  2012   • NEPHRECTOMY Right 2018    Oncocytoma      Family History   Problem Relation Age of Onset   • Arthritis Mother    • Diabetes Mother    • Cancer Father    • Colon cancer Father    • Diabetes Sister    • Heart attack Sister    • Diabetes Brother    • Heart attack Brother    • Cancer Brother    • Colon polyps Brother    • Prostate cancer Brother    • Diabetes Son    • Diabetes Maternal Grandmother    • Cervical cancer Maternal Grandmother    • Cancer Brother         on lips   • Breast cancer Neg Hx    • Ovarian cancer Neg Hx       Social History     Socioeconomic History   • Marital status:      Spouse name: Not on file   • Number of children: Not on file   • Years of education: Not on file   • Highest education level: Not on file   Social Needs   • Financial resource strain: Not on file   • Food insecurity - worry: Not on file   • Food insecurity - inability: Not on file   • Transportation needs - medical: Not on file   • Transportation needs - non-medical: Not on file   Occupational History   • Not on file   Tobacco Use   • Smoking status: Former Smoker     Years: 2.00     Last attempt to quit: 1964     Years since quittin.0   • Smokeless tobacco: Never Used   • Tobacco comment: 2 years only   Substance and Sexual Activity   • Alcohol use: Yes     Comment: rarely drinks wine   • Drug use: No   • Sexual activity: Defer   Other Topics Concern   • Not on file   Social History Narrative   • Not on file        Review of Systems   Constitutional: Negative.  Negative for chills, diaphoresis, fatigue and fever.   HENT: Negative.  Negative for congestion, ear pain, nosebleeds, postnasal drip, rhinorrhea, sinus pressure, sneezing and sore throat.    Eyes: Positive for discharge, redness and itching. Negative for pain.   Respiratory: Negative.  Negative for cough, shortness of breath and wheezing.    Cardiovascular: Negative.  Negative  for chest pain and palpitations.   Gastrointestinal: Negative.  Negative for abdominal pain, anorexia, constipation, diarrhea, nausea and vomiting.   Endocrine: Negative.  Negative for cold intolerance and heat intolerance.   Genitourinary: Negative.  Negative for dysuria, frequency, hematuria and urgency.   Musculoskeletal: Negative.  Negative for arthralgias, back pain, joint pain, myalgias and neck pain.   Skin: Positive for rash. Negative for color change and nail changes.        improving   Allergic/Immunologic: Negative.  Negative for environmental allergies.   Neurological: Negative.  Negative for dizziness, focal weakness, syncope, light-headedness and headaches.   Hematological: Negative for adenopathy. Bruises/bleeds easily.   Psychiatric/Behavioral: Negative.  Negative for dysphoric mood. The patient is not nervous/anxious.        Objective   Physical Exam   Constitutional: She is oriented to person, place, and time. She appears well-developed.   HENT:   Head: Normocephalic.   Right Ear: External ear normal.   Left Ear: External ear normal.   Nose: Nose normal.   Eyes: Conjunctivae, EOM and lids are normal. Pupils are equal, round, and reactive to light. Right eye exhibits hordeolum (with abrasion right lower lid).   Neck: Trachea normal and normal range of motion. Neck supple. Carotid bruit is not present. No thyroid mass and no thyromegaly present.   Cardiovascular: Normal rate and regular rhythm.   No murmur heard.  Pulmonary/Chest: Effort normal and breath sounds normal. No respiratory distress. She has no decreased breath sounds. She has no wheezes. She has no rhonchi. She has no rales. She exhibits no tenderness.   Abdominal: Soft. Bowel sounds are normal. There is no tenderness.   Musculoskeletal: Normal range of motion.   Neurological: She is alert and oriented to person, place, and time.   Skin: Skin is warm and dry.   Psychiatric: She has a normal mood and affect. Her behavior is normal.    Nursing note and vitals reviewed.      Assessment/Plan   Anabell was seen today for discuss kidney function and rash.    Diagnoses and all orders for this visit:    Single kidney    Essential hypertension  -     ramipril (ALTACE) 10 MG capsule; Take 1 capsule by mouth Daily.  -     Comprehensive Metabolic Panel  -     Lipid Panel    Mixed hyperlipidemia  -     Comprehensive Metabolic Panel  -     Lipid Panel    CKD (chronic kidney disease) stage 3, GFR 30-59 ml/min (CMS/[facility])  -     Urinalysis With Microscopic - Urine, Clean Catch  -     Comprehensive Metabolic Panel  -     Urinalysis without microscopic (no culture) - Urine, Clean Catch  -     Urinalysis, Microscopic Only - Urine, Clean Catch    Acute ischemic CVA with resolved right sided deficits  -     clopidogrel (PLAVIX) 75 MG tablet; Take 1 tablet by mouth Daily.    Hyperlipidemia, unspecified hyperlipidemia type  -     atorvastatin (LIPITOR) 80 MG tablet; Take 0.5 tablets by mouth Every Night.        Use artificial tears on eyes.   Discussed Shingrix vaccine with pt,  that is currently available at the pharmacy.              Current Outpatient Medications:   •  atorvastatin (LIPITOR) 80 MG tablet, Take 0.5 tablets by mouth Every Night., Disp: 45 tablet, Rfl: 3  •  CALCIUM-MAGNESIUM-VITAMIN D PO, Take 1 capsule by mouth Daily. Calcium 600 mg-Magnesium 300-Vitamin D 400 iu, Disp: , Rfl:   •  clopidogrel (PLAVIX) 75 MG tablet, Take 1 tablet by mouth Daily., Disp: 90 tablet, Rfl: 1  •  fluticasone (FLONASE) 50 MCG/ACT nasal spray, 2 sprays into each nostril Daily., Disp: , Rfl:   •  Multiple Vitamins-Minerals (MULTI VITAMIN/MINERALS PO), Take  by mouth daily., Disp: , Rfl:   •  ramipril (ALTACE) 10 MG capsule, Take 1 capsule by mouth Daily., Disp: 90 capsule, Rfl: 1    Return in about 6 months (around 7/16/2019), or if symptoms worsen or fail to improve, for Recheck, Medicare Wellness.

## 2019-01-18 ENCOUNTER — TELEPHONE (OUTPATIENT)
Dept: INTERNAL MEDICINE | Facility: CLINIC | Age: 79
End: 2019-01-18

## 2019-01-18 DIAGNOSIS — R31.21 ASYMPTOMATIC MICROSCOPIC HEMATURIA: Primary | ICD-10-CM

## 2019-01-18 DIAGNOSIS — R79.89 LOW SERUM SODIUM: ICD-10-CM

## 2019-01-18 NOTE — TELEPHONE ENCOUNTER
Pt states she has a hx of blood in urine. She doesn't have any symptoms but would like to come in next week and to repeat urine and may want to see what culture shows.

## 2019-01-18 NOTE — TELEPHONE ENCOUNTER
----- Message from Alayna PEREZ MD sent at 1/17/2019  4:46 PM EST -----  Please call the patient regarding her abnormal result. Sodium a little low , Increased her free water?.  GFR remains low.  Avoid nonsteroidal anti-inflammatories.  There was blood in the urine.  Has she had this in the past? If she's having urinary symptoms please let us know. If there are symptoms will culture otherwise repeat the urinalysis.  This can be done in the office.  Cholesterol level was good.

## 2019-01-25 ENCOUNTER — TELEPHONE (OUTPATIENT)
Dept: INTERNAL MEDICINE | Facility: CLINIC | Age: 79
End: 2019-01-25

## 2019-01-25 ENCOUNTER — LAB (OUTPATIENT)
Dept: INTERNAL MEDICINE | Facility: CLINIC | Age: 79
End: 2019-01-25

## 2019-01-25 DIAGNOSIS — R31.21 ASYMPTOMATIC MICROSCOPIC HEMATURIA: Primary | ICD-10-CM

## 2019-01-25 DIAGNOSIS — R31.21 ASYMPTOMATIC MICROSCOPIC HEMATURIA: ICD-10-CM

## 2019-01-25 DIAGNOSIS — R79.89 LOW SERUM SODIUM: ICD-10-CM

## 2019-01-25 LAB
ANION GAP SERPL CALCULATED.3IONS-SCNC: 7 MMOL/L (ref 3–11)
BILIRUB BLD-MCNC: NEGATIVE MG/DL
BUN BLD-MCNC: 17 MG/DL (ref 9–23)
BUN/CREAT SERPL: 15 (ref 7–25)
CALCIUM SPEC-SCNC: 9 MG/DL (ref 8.7–10.4)
CHLORIDE SERPL-SCNC: 97 MMOL/L (ref 99–109)
CLARITY, POC: CLEAR
CO2 SERPL-SCNC: 26 MMOL/L (ref 20–31)
COLOR UR: YELLOW
CREAT BLD-MCNC: 1.13 MG/DL (ref 0.6–1.3)
GFR SERPL CREATININE-BSD FRML MDRD: 47 ML/MIN/1.73
GLUCOSE BLD-MCNC: 97 MG/DL (ref 70–100)
GLUCOSE UR STRIP-MCNC: NEGATIVE MG/DL
KETONES UR QL: NEGATIVE
LEUKOCYTE EST, POC: NEGATIVE
NITRITE UR-MCNC: NEGATIVE MG/ML
PH UR: 7 [PH] (ref 5–8)
POTASSIUM BLD-SCNC: 4.5 MMOL/L (ref 3.5–5.5)
PROT UR STRIP-MCNC: NEGATIVE MG/DL
RBC # UR STRIP: ABNORMAL /UL
SODIUM BLD-SCNC: 130 MMOL/L (ref 132–146)
SP GR UR: 1.01 (ref 1–1.03)
UROBILINOGEN UR QL: NORMAL

## 2019-01-25 PROCEDURE — 81003 URINALYSIS AUTO W/O SCOPE: CPT | Performed by: FAMILY MEDICINE

## 2019-01-25 PROCEDURE — 80048 BASIC METABOLIC PNL TOTAL CA: CPT | Performed by: FAMILY MEDICINE

## 2019-01-25 PROCEDURE — 87086 URINE CULTURE/COLONY COUNT: CPT | Performed by: FAMILY MEDICINE

## 2019-01-25 NOTE — TELEPHONE ENCOUNTER
----- Message from Alayna PEREZ MD sent at 1/25/2019 12:45 PM EST -----  Please call the patient regarding her abnormal result. Urology referral placed for hematuria

## 2019-01-27 LAB — BACTERIA SPEC AEROBE CULT: NORMAL

## 2019-01-28 ENCOUNTER — TELEPHONE (OUTPATIENT)
Dept: INTERNAL MEDICINE | Facility: CLINIC | Age: 79
End: 2019-01-28

## 2019-01-28 NOTE — TELEPHONE ENCOUNTER
She sees a urologist already. Has had tests already and he was ok with the amount of blood.     Disregard this message, after sending message she told me that he released her. She wanted to know if she needs to go back. She said that her blood in urine was at 2+ and urologist was ok with this.

## 2019-08-12 DIAGNOSIS — I10 ESSENTIAL HYPERTENSION: ICD-10-CM

## 2019-08-12 DIAGNOSIS — E78.2 MIXED HYPERLIPIDEMIA: ICD-10-CM

## 2019-08-12 DIAGNOSIS — E55.9 VITAMIN D DEFICIENCY: Primary | ICD-10-CM

## 2019-08-12 DIAGNOSIS — I63.9 ACUTE CVA (CEREBROVASCULAR ACCIDENT) (HCC): ICD-10-CM

## 2019-08-12 RX ORDER — CLOPIDOGREL BISULFATE 75 MG/1
75 TABLET ORAL DAILY
Qty: 30 TABLET | Refills: 0 | Status: SHIPPED | OUTPATIENT
Start: 2019-08-12 | End: 2019-08-19 | Stop reason: SDUPTHER

## 2019-08-12 NOTE — TELEPHONE ENCOUNTER
Patient has a 6 mo fu sched next Monday, she said she needs a urine test and labs.  She wants to come get them done this week prior to apt.  Please call once orders are in at [phone]

## 2019-08-15 ENCOUNTER — LAB (OUTPATIENT)
Dept: INTERNAL MEDICINE | Facility: CLINIC | Age: 79
End: 2019-08-15

## 2019-08-15 DIAGNOSIS — E55.9 VITAMIN D DEFICIENCY: ICD-10-CM

## 2019-08-15 DIAGNOSIS — I10 ESSENTIAL HYPERTENSION: ICD-10-CM

## 2019-08-15 DIAGNOSIS — E78.2 MIXED HYPERLIPIDEMIA: ICD-10-CM

## 2019-08-15 LAB
25(OH)D3 SERPL-MCNC: 55.5 NG/ML (ref 30–100)
ALBUMIN SERPL-MCNC: 4.9 G/DL (ref 3.5–5.2)
ALBUMIN/GLOB SERPL: 2.6 G/DL
ALP SERPL-CCNC: 76 U/L (ref 39–117)
ALT SERPL W P-5'-P-CCNC: 13 U/L (ref 1–33)
ANION GAP SERPL CALCULATED.3IONS-SCNC: 15.9 MMOL/L (ref 5–15)
AST SERPL-CCNC: 20 U/L (ref 1–32)
BASOPHILS # BLD AUTO: 0.02 10*3/MM3 (ref 0–0.2)
BASOPHILS NFR BLD AUTO: 0.3 % (ref 0–1.5)
BILIRUB SERPL-MCNC: 0.7 MG/DL (ref 0.2–1.2)
BUN BLD-MCNC: 16 MG/DL (ref 8–23)
BUN/CREAT SERPL: 13.1 (ref 7–25)
CALCIUM SPEC-SCNC: 9.6 MG/DL (ref 8.6–10.5)
CHLORIDE SERPL-SCNC: 91 MMOL/L (ref 98–107)
CHOLEST SERPL-MCNC: 134 MG/DL (ref 0–200)
CO2 SERPL-SCNC: 23.1 MMOL/L (ref 22–29)
CREAT BLD-MCNC: 1.22 MG/DL (ref 0.57–1)
DEPRECATED RDW RBC AUTO: 42.4 FL (ref 37–54)
EOSINOPHIL # BLD AUTO: 0.05 10*3/MM3 (ref 0–0.4)
EOSINOPHIL NFR BLD AUTO: 0.8 % (ref 0.3–6.2)
ERYTHROCYTE [DISTWIDTH] IN BLOOD BY AUTOMATED COUNT: 12.7 % (ref 12.3–15.4)
GFR SERPL CREATININE-BSD FRML MDRD: 43 ML/MIN/1.73
GLOBULIN UR ELPH-MCNC: 1.9 GM/DL
GLUCOSE BLD-MCNC: 84 MG/DL (ref 65–99)
HCT VFR BLD AUTO: 43.2 % (ref 34–46.6)
HDLC SERPL-MCNC: 51 MG/DL (ref 40–60)
HGB BLD-MCNC: 14 G/DL (ref 12–15.9)
IMM GRANULOCYTES # BLD AUTO: 0.03 10*3/MM3 (ref 0–0.05)
IMM GRANULOCYTES NFR BLD AUTO: 0.5 % (ref 0–0.5)
LDLC SERPL CALC-MCNC: 60 MG/DL (ref 0–100)
LDLC/HDLC SERPL: 1.18 {RATIO}
LYMPHOCYTES # BLD AUTO: 1.7 10*3/MM3 (ref 0.7–3.1)
LYMPHOCYTES NFR BLD AUTO: 26.2 % (ref 19.6–45.3)
MCH RBC QN AUTO: 29.7 PG (ref 26.6–33)
MCHC RBC AUTO-ENTMCNC: 32.4 G/DL (ref 31.5–35.7)
MCV RBC AUTO: 91.5 FL (ref 79–97)
MONOCYTES # BLD AUTO: 0.79 10*3/MM3 (ref 0.1–0.9)
MONOCYTES NFR BLD AUTO: 12.2 % (ref 5–12)
NEUTROPHILS # BLD AUTO: 3.91 10*3/MM3 (ref 1.7–7)
NEUTROPHILS NFR BLD AUTO: 60 % (ref 42.7–76)
NRBC BLD AUTO-RTO: 0 /100 WBC (ref 0–0.2)
PLATELET # BLD AUTO: 165 10*3/MM3 (ref 140–450)
PMV BLD AUTO: 11.2 FL (ref 6–12)
POTASSIUM BLD-SCNC: 4.5 MMOL/L (ref 3.5–5.2)
PROT SERPL-MCNC: 6.8 G/DL (ref 6–8.5)
RBC # BLD AUTO: 4.72 10*6/MM3 (ref 3.77–5.28)
SODIUM BLD-SCNC: 130 MMOL/L (ref 136–145)
TRIGL SERPL-MCNC: 113 MG/DL (ref 0–150)
TSH SERPL DL<=0.05 MIU/L-ACNC: 3.7 MIU/ML (ref 0.27–4.2)
VLDLC SERPL-MCNC: 22.6 MG/DL (ref 5–40)
WBC NRBC COR # BLD: 6.5 10*3/MM3 (ref 3.4–10.8)

## 2019-08-15 PROCEDURE — 80061 LIPID PANEL: CPT | Performed by: FAMILY MEDICINE

## 2019-08-15 PROCEDURE — 82306 VITAMIN D 25 HYDROXY: CPT | Performed by: FAMILY MEDICINE

## 2019-08-15 PROCEDURE — 85025 COMPLETE CBC W/AUTO DIFF WBC: CPT | Performed by: FAMILY MEDICINE

## 2019-08-15 PROCEDURE — 80053 COMPREHEN METABOLIC PANEL: CPT | Performed by: FAMILY MEDICINE

## 2019-08-15 PROCEDURE — 84443 ASSAY THYROID STIM HORMONE: CPT | Performed by: FAMILY MEDICINE

## 2019-08-19 ENCOUNTER — OFFICE VISIT (OUTPATIENT)
Dept: INTERNAL MEDICINE | Facility: CLINIC | Age: 79
End: 2019-08-19

## 2019-08-19 VITALS
HEART RATE: 60 BPM | HEIGHT: 62 IN | DIASTOLIC BLOOD PRESSURE: 70 MMHG | SYSTOLIC BLOOD PRESSURE: 130 MMHG | BODY MASS INDEX: 26.09 KG/M2 | WEIGHT: 141.8 LBS | TEMPERATURE: 97.3 F | OXYGEN SATURATION: 100 %

## 2019-08-19 DIAGNOSIS — I10 ESSENTIAL HYPERTENSION: Primary | ICD-10-CM

## 2019-08-19 DIAGNOSIS — I63.9 ACUTE CVA (CEREBROVASCULAR ACCIDENT) (HCC): ICD-10-CM

## 2019-08-19 DIAGNOSIS — E78.2 MIXED HYPERLIPIDEMIA: ICD-10-CM

## 2019-08-19 DIAGNOSIS — R31.21 ASYMPTOMATIC MICROSCOPIC HEMATURIA: ICD-10-CM

## 2019-08-19 DIAGNOSIS — Z79.01 ANTICOAGULANT LONG-TERM USE: ICD-10-CM

## 2019-08-19 DIAGNOSIS — Z12.11 SCREENING FOR COLON CANCER: ICD-10-CM

## 2019-08-19 DIAGNOSIS — N18.30 CKD (CHRONIC KIDNEY DISEASE) STAGE 3, GFR 30-59 ML/MIN (HCC): ICD-10-CM

## 2019-08-19 DIAGNOSIS — E78.5 HYPERLIPIDEMIA, UNSPECIFIED HYPERLIPIDEMIA TYPE: ICD-10-CM

## 2019-08-19 DIAGNOSIS — Z86.73 HISTORY OF EMBOLIC STROKE WITHOUT RESIDUAL DEFICITS: ICD-10-CM

## 2019-08-19 LAB
BILIRUB BLD-MCNC: NEGATIVE MG/DL
CLARITY, POC: CLEAR
COLOR UR: YELLOW
GLUCOSE UR STRIP-MCNC: NEGATIVE MG/DL
KETONES UR QL: NEGATIVE
LEUKOCYTE EST, POC: NEGATIVE
NITRITE UR-MCNC: NEGATIVE MG/ML
PH UR: 6.5 [PH] (ref 5–8)
PROT UR STRIP-MCNC: NEGATIVE MG/DL
RBC # UR STRIP: ABNORMAL /UL
SP GR UR: 1.01 (ref 1–1.03)
UROBILINOGEN UR QL: NORMAL

## 2019-08-19 PROCEDURE — 99214 OFFICE O/P EST MOD 30 MIN: CPT | Performed by: FAMILY MEDICINE

## 2019-08-19 PROCEDURE — 81003 URINALYSIS AUTO W/O SCOPE: CPT | Performed by: FAMILY MEDICINE

## 2019-08-19 PROCEDURE — 87086 URINE CULTURE/COLONY COUNT: CPT | Performed by: FAMILY MEDICINE

## 2019-08-19 RX ORDER — ATORVASTATIN CALCIUM 80 MG/1
40 TABLET, FILM COATED ORAL NIGHTLY
Qty: 45 TABLET | Refills: 5 | Status: SHIPPED | OUTPATIENT
Start: 2019-08-19 | End: 2020-08-21 | Stop reason: SDUPTHER

## 2019-08-19 RX ORDER — CLOPIDOGREL BISULFATE 75 MG/1
75 TABLET ORAL DAILY
Qty: 90 TABLET | Refills: 1 | Status: SHIPPED | OUTPATIENT
Start: 2019-08-19 | End: 2020-02-21 | Stop reason: SDUPTHER

## 2019-08-19 RX ORDER — RAMIPRIL 10 MG/1
10 CAPSULE ORAL DAILY
Qty: 90 CAPSULE | Refills: 1 | Status: SHIPPED | OUTPATIENT
Start: 2019-08-19 | End: 2020-02-21 | Stop reason: SDUPTHER

## 2019-08-19 NOTE — PROGRESS NOTES
"Subjective   Anabell Vera is a 79 y.o. female.     Chief Complaint   Patient presents with   • kidney problem     she states her kidney function of the one kidney is declining and wanted to know if she can be referred   • Hypertension     f/u. has a list of bps       Visit Vitals  /70 (BP Location: Right arm, Cuff Size: Large Adult)   Pulse 60   Temp 97.3 °F (36.3 °C)   Ht 157.5 cm (62\")   Wt 64.3 kg (141 lb 12.8 oz)   LMP  (LMP Unknown)   SpO2 100%   BMI 25.94 kg/m²         Hypertension   This is a chronic problem. The current episode started more than 1 year ago. The problem is unchanged. The problem is controlled. Pertinent negatives include no chest pain, headaches, neck pain, palpitations or shortness of breath. There are no associated agents to hypertension. Risk factors for coronary artery disease include dyslipidemia and post-menopausal state. Current antihypertension treatment includes ACE inhibitors. The current treatment provides significant improvement. There are no compliance problems.  Identifiable causes of hypertension include chronic renal disease. There is no history of sleep apnea or a thyroid problem.   Hyperlipidemia   This is a chronic problem. The current episode started more than 1 year ago. The problem is controlled. Recent lipid tests were reviewed and are normal. Exacerbating diseases include chronic renal disease. She has no history of diabetes, hypothyroidism, liver disease, obesity or nephrotic syndrome. Associated symptoms include a focal sensory loss (cts in hands). Pertinent negatives include no chest pain, focal weakness, leg pain, myalgias or shortness of breath. Current antihyperlipidemic treatment includes statins. The current treatment provides significant improvement of lipids. There are no compliance problems.  Risk factors for coronary artery disease include dyslipidemia, family history, hypertension and post-menopausal.      Pt has been eating salty food.  Pt is " drinking a lot of water. Sodium was slightly low.  Pt has CKD 3, that is slightly worse.     Pt's father had colon cancer, pt due for colonoscopy.   The following portions of the patient's history were reviewed and updated as appropriate: allergies, current medications, past family history, past medical history, past social history, past surgical history and problem list.    Past Medical History:   Diagnosis Date   • Abdominal pain, epigastric    • Abdominal pain, RLQ (right lower quadrant)    • Diverticulosis of colon    • Easy bruising     on blood thinners   • Eczema 3/21/2018   • Esophageal reflux    • Fractures    • Gallstones    • GERD (gastroesophageal reflux disease)    • H/O mammogram 08/12/2016   • Hematuria, microscopic    • High cholesterol    • Hypertension    • Malignant neoplasm of skin    • Pap smear for cervical cancer screening 08/14/2015   • PFO (patent foramen ovale) 10/2016    per echo   • Positive TB test    • Post-cholecystectomy syndrome    • 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   • Right kidney mass 2016    found by Dr Cameron Hall   • Single kidney 01/26/2018   • Stroke (CMS/HCC) 10/2016   • Thyroid disease    • Thyroid disease       Past Surgical History:   Procedure Laterality Date   • CHOLECYSTECTOMY  12/04/2012   • NEPHRECTOMY Right 01/26/2018    Oncocytoma      Family History   Problem Relation Age of Onset   • Arthritis Mother    • Diabetes Mother    • Cancer Father    • Colon cancer Father    • Diabetes Sister    • Heart attack Sister    • Diabetes Brother    • Heart attack Brother    • Cancer Brother    • Colon polyps Brother    • Prostate cancer Brother    • Diabetes Son    • Diabetes Maternal Grandmother    • Cervical cancer Maternal Grandmother    • Cancer Brother         on lips   • Breast cancer Neg Hx    • Ovarian cancer Neg Hx       Social History     Socioeconomic History   • Marital status:      Spouse name: Not on file   • Number of children: Not on file   •  Years of education: Not on file   • Highest education level: Not on file   Tobacco Use   • Smoking status: Former Smoker     Years: 2.00     Last attempt to quit: 1964     Years since quittin.6   • Smokeless tobacco: Never Used   • Tobacco comment: 2 years only   Substance and Sexual Activity   • Alcohol use: Yes     Comment: rarely drinks wine   • Drug use: No   • Sexual activity: Defer      Allergies   Allergen Reactions   • Wheat Rash   • Amoxicillin Rash   • Penicillins Rash       Review of Systems   Constitutional: Negative.  Negative for chills, diaphoresis, fatigue and fever.   HENT: Negative.  Negative for ear pain, nosebleeds, postnasal drip, rhinorrhea, sinus pressure, sneezing and sore throat.    Eyes: Negative.  Negative for redness and itching.   Respiratory: Negative.  Negative for cough, shortness of breath and wheezing.    Cardiovascular: Negative.  Negative for chest pain and palpitations.   Gastrointestinal: Negative.  Negative for abdominal pain, constipation, diarrhea, nausea and vomiting.   Endocrine: Negative.  Negative for cold intolerance and heat intolerance.   Genitourinary: Negative.  Negative for dysuria, frequency, hematuria and urgency.   Musculoskeletal: Negative.  Negative for arthralgias, back pain, myalgias and neck pain.   Skin: Negative.  Negative for color change and rash.        Pt has less eczema with decreased wheat intake   Allergic/Immunologic: Positive for environmental allergies.   Neurological: Positive for numbness (CTS in certain positions). Negative for dizziness, focal weakness, syncope, light-headedness and headaches.   Hematological: Negative.  Negative for adenopathy. Does not bruise/bleed easily.   Psychiatric/Behavioral: Negative.  Negative for dysphoric mood. The patient is not nervous/anxious.        Objective   Physical Exam   Constitutional: She is oriented to person, place, and time. She appears well-developed.   HENT:   Head: Normocephalic.   Right  Ear: External ear normal.   Left Ear: External ear normal.   Nose: Nose normal.   Eyes: Conjunctivae, EOM and lids are normal. Pupils are equal, round, and reactive to light.   Neck: Trachea normal and normal range of motion. Neck supple. Carotid bruit is not present. No thyroid mass and no thyromegaly present.   Cardiovascular: Normal rate and regular rhythm.   No murmur heard.  Pulmonary/Chest: Effort normal and breath sounds normal. No respiratory distress. She has no decreased breath sounds. She has no wheezes. She has no rhonchi. She has no rales. She exhibits no tenderness.   Abdominal: Soft. Bowel sounds are normal. There is no tenderness.   Musculoskeletal: Normal range of motion.   Neurological: She is alert and oriented to person, place, and time.   Skin: Skin is warm and dry.   Psychiatric: She has a normal mood and affect. Her behavior is normal.   Nursing note and vitals reviewed.      Assessment/Plan   Anabell was seen today for kidney problem and hypertension.    Diagnoses and all orders for this visit:    Essential hypertension  -     ramipril (ALTACE) 10 MG capsule; Take 1 capsule by mouth Daily.    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    Hyperlipidemia, unspecified hyperlipidemia type  -     atorvastatin (LIPITOR) 80 MG tablet; Take 0.5 tablets by mouth Every Night.    Asymptomatic microscopic hematuria  -     Urine Culture - Urine, Urine, Clean Catch  -     POC Urinalysis Dipstick, Automated    CKD (chronic kidney disease) stage 3, GFR 30-59 ml/min (CMS/[facility])  -     Ambulatory Referral to Nephrology    Mixed hyperlipidemia    History of embolic stroke without residual deficits    Anticoagulant long-term use    Screening for colon cancer  -     Ambulatory Referral For Screening Colonoscopy      Pt states that her Urologist is aware of microscopic hematuria and will watch.    Discussed Shingrix vaccine with pt,   that is currently available at the pharmacy.   Reviewed lab with pt           Current Outpatient Medications:   •  atorvastatin (LIPITOR) 80 MG tablet, Take 0.5 tablets by mouth Every Night., Disp: 45 tablet, Rfl: 5  •  CALCIUM-MAGNESIUM-VITAMIN D PO, Take 1 capsule by mouth Daily. Calcium 600 mg-Magnesium 300-Vitamin D 400 iu, Disp: , Rfl:   •  clopidogrel (PLAVIX) 75 MG tablet, Take 1 tablet by mouth Daily., Disp: 90 tablet, Rfl: 1  •  Multiple Vitamins-Minerals (MULTI VITAMIN/MINERALS PO), Take  by mouth daily., Disp: , Rfl:   •  ramipril (ALTACE) 10 MG capsule, Take 1 capsule by mouth Daily., Disp: 90 capsule, Rfl: 1  •  fluticasone (FLONASE) 50 MCG/ACT nasal spray, 2 sprays into each nostril Daily., Disp: , Rfl:     Return in about 6 months (around 2/19/2020), or if symptoms worsen or fail to improve, for Recheck.    Recent Results (from the past 336 hour(s))   Comprehensive Metabolic Panel    Collection Time: 08/15/19 11:17 AM   Result Value Ref Range    Glucose 84 65 - 99 mg/dL    BUN 16 8 - 23 mg/dL    Creatinine 1.22 (H) 0.57 - 1.00 mg/dL    Sodium 130 (L) 136 - 145 mmol/L    Potassium 4.5 3.5 - 5.2 mmol/L    Chloride 91 (L) 98 - 107 mmol/L    CO2 23.1 22.0 - 29.0 mmol/L    Calcium 9.6 8.6 - 10.5 mg/dL    Total Protein 6.8 6.0 - 8.5 g/dL    Albumin 4.90 3.50 - 5.20 g/dL    ALT (SGPT) 13 1 - 33 U/L    AST (SGOT) 20 1 - 32 U/L    Alkaline Phosphatase 76 39 - 117 U/L    Total Bilirubin 0.7 0.2 - 1.2 mg/dL    eGFR Non African Amer 43 (L) >60 mL/min/1.73    Globulin 1.9 gm/dL    A/G Ratio 2.6 g/dL    BUN/Creatinine Ratio 13.1 7.0 - 25.0    Anion Gap 15.9 (H) 5.0 - 15.0 mmol/L   Lipid Panel    Collection Time: 08/15/19 11:17 AM   Result Value Ref Range    Total Cholesterol 134 0 - 200 mg/dL    Triglycerides 113 0 - 150 mg/dL    HDL Cholesterol 51 40 - 60 mg/dL    LDL Cholesterol  60 0 - 100 mg/dL    VLDL Cholesterol 22.6 5 - 40 mg/dL    LDL/HDL Ratio 1.18    TSH    Collection Time: 08/15/19 11:17 AM   Result  Value Ref Range    TSH 3.700 0.270 - 4.200 mIU/mL   Vitamin D 25 Hydroxy    Collection Time: 08/15/19 11:17 AM   Result Value Ref Range    25 Hydroxy, Vitamin D 55.5 30.0 - 100.0 ng/ml   CBC Auto Differential    Collection Time: 08/15/19 11:17 AM   Result Value Ref Range    WBC 6.50 3.40 - 10.80 10*3/mm3    RBC 4.72 3.77 - 5.28 10*6/mm3    Hemoglobin 14.0 12.0 - 15.9 g/dL    Hematocrit 43.2 34.0 - 46.6 %    MCV 91.5 79.0 - 97.0 fL    MCH 29.7 26.6 - 33.0 pg    MCHC 32.4 31.5 - 35.7 g/dL    RDW 12.7 12.3 - 15.4 %    RDW-SD 42.4 37.0 - 54.0 fl    MPV 11.2 6.0 - 12.0 fL    Platelets 165 140 - 450 10*3/mm3    Neutrophil % 60.0 42.7 - 76.0 %    Lymphocyte % 26.2 19.6 - 45.3 %    Monocyte % 12.2 (H) 5.0 - 12.0 %    Eosinophil % 0.8 0.3 - 6.2 %    Basophil % 0.3 0.0 - 1.5 %    Immature Grans % 0.5 0.0 - 0.5 %    Neutrophils, Absolute 3.91 1.70 - 7.00 10*3/mm3    Lymphocytes, Absolute 1.70 0.70 - 3.10 10*3/mm3    Monocytes, Absolute 0.79 0.10 - 0.90 10*3/mm3    Eosinophils, Absolute 0.05 0.00 - 0.40 10*3/mm3    Basophils, Absolute 0.02 0.00 - 0.20 10*3/mm3    Immature Grans, Absolute 0.03 0.00 - 0.05 10*3/mm3    nRBC 0.0 0.0 - 0.2 /100 WBC   POC Urinalysis Dipstick, Automated    Collection Time: 08/19/19  5:21 PM   Result Value Ref Range    Color Yellow Yellow, Straw, Dark Yellow, Mary    Clarity, UA Clear Clear    Specific Gravity  1.010 1.005 - 1.030    pH, Urine 6.5 5.0 - 8.0    Leukocytes Negative Negative    Nitrite, UA Negative Negative    Protein, POC Negative Negative mg/dL    Glucose, UA Negative Negative, 1000 mg/dL (3+) mg/dL    Ketones, UA Negative Negative    Urobilinogen, UA Normal Normal    Bilirubin Negative Negative    Blood, UA 1+ (A) Negative

## 2019-08-20 ENCOUNTER — TELEPHONE (OUTPATIENT)
Dept: INTERNAL MEDICINE | Facility: CLINIC | Age: 79
End: 2019-08-20

## 2019-08-20 LAB — BACTERIA SPEC AEROBE CULT: NO GROWTH

## 2019-08-20 NOTE — TELEPHONE ENCOUNTER
----- Message from Alayna PEREZ MD sent at 8/16/2019  5:56 PM EDT -----  Sodium remains slightly low.  GFR is slightly lower than previous.  Please avoid Advil, Aleve or nonsteroidal anti-inflammatories which can damage the kidney.  Vitamin D is acceptable.  Patient can decrease vitamin D supplementation.  Thyroid lab is good.  Glucose, liver enzymes, cholesterol, blood count are all acceptable.

## 2019-08-20 NOTE — TELEPHONE ENCOUNTER
PN and has seen on my chart. Advised Dr. Toscano hasn't signed off on her urine culture but was normal with no growth. Pt verbalized good understanding. Advised that as soon as Dr. Toscano signs off on the culture she would be able to see on my chart.

## 2019-08-22 DIAGNOSIS — I10 ESSENTIAL HYPERTENSION: ICD-10-CM

## 2019-08-23 RX ORDER — RAMIPRIL 10 MG/1
10 CAPSULE ORAL DAILY
Qty: 90 CAPSULE | Refills: 0 | OUTPATIENT
Start: 2019-08-23

## 2019-09-27 ENCOUNTER — TELEPHONE (OUTPATIENT)
Dept: INTERNAL MEDICINE | Facility: CLINIC | Age: 79
End: 2019-09-27

## 2019-09-27 NOTE — TELEPHONE ENCOUNTER
Pt is having a colonoscopy on 10/3/19 and she needs to double check if it is ok to stop plavix and when before can she stop

## 2019-09-27 NOTE — TELEPHONE ENCOUNTER
If ok with her Neurologist about stopping 5 days before. But many GI's will do colonoscopy without stopping plavix

## 2020-02-21 ENCOUNTER — OFFICE VISIT (OUTPATIENT)
Dept: INTERNAL MEDICINE | Facility: CLINIC | Age: 80
End: 2020-02-21

## 2020-02-21 VITALS
BODY MASS INDEX: 24.69 KG/M2 | SYSTOLIC BLOOD PRESSURE: 150 MMHG | TEMPERATURE: 97.7 F | WEIGHT: 134.2 LBS | HEIGHT: 62 IN | DIASTOLIC BLOOD PRESSURE: 70 MMHG

## 2020-02-21 DIAGNOSIS — E78.2 MIXED HYPERLIPIDEMIA: ICD-10-CM

## 2020-02-21 DIAGNOSIS — I10 ESSENTIAL HYPERTENSION: Primary | ICD-10-CM

## 2020-02-21 DIAGNOSIS — I63.9 ACUTE CVA (CEREBROVASCULAR ACCIDENT) (HCC): ICD-10-CM

## 2020-02-21 DIAGNOSIS — E55.9 VITAMIN D DEFICIENCY: ICD-10-CM

## 2020-02-21 LAB
25(OH)D3 SERPL-MCNC: 49.7 NG/ML (ref 30–100)
ALBUMIN SERPL-MCNC: 4.8 G/DL (ref 3.5–5.2)
ALBUMIN/GLOB SERPL: 2.2 G/DL
ALP SERPL-CCNC: 75 U/L (ref 39–117)
ALT SERPL W P-5'-P-CCNC: 15 U/L (ref 1–33)
ANION GAP SERPL CALCULATED.3IONS-SCNC: 12.4 MMOL/L (ref 5–15)
AST SERPL-CCNC: 22 U/L (ref 1–32)
BILIRUB SERPL-MCNC: 0.7 MG/DL (ref 0.2–1.2)
BUN BLD-MCNC: 19 MG/DL (ref 8–23)
BUN/CREAT SERPL: 17.1 (ref 7–25)
CALCIUM SPEC-SCNC: 10 MG/DL (ref 8.6–10.5)
CHLORIDE SERPL-SCNC: 93 MMOL/L (ref 98–107)
CHOLEST SERPL-MCNC: 139 MG/DL (ref 0–200)
CO2 SERPL-SCNC: 25.6 MMOL/L (ref 22–29)
CREAT BLD-MCNC: 1.11 MG/DL (ref 0.57–1)
GFR SERPL CREATININE-BSD FRML MDRD: 47 ML/MIN/1.73
GLOBULIN UR ELPH-MCNC: 2.2 GM/DL
GLUCOSE BLD-MCNC: 99 MG/DL (ref 65–99)
HDLC SERPL-MCNC: 51 MG/DL (ref 40–60)
LDLC SERPL CALC-MCNC: 66 MG/DL (ref 0–100)
LDLC/HDLC SERPL: 1.29 {RATIO}
POTASSIUM BLD-SCNC: 4.8 MMOL/L (ref 3.5–5.2)
PROT SERPL-MCNC: 7 G/DL (ref 6–8.5)
SODIUM BLD-SCNC: 131 MMOL/L (ref 136–145)
TRIGL SERPL-MCNC: 111 MG/DL (ref 0–150)
VLDLC SERPL-MCNC: 22.2 MG/DL (ref 5–40)

## 2020-02-21 PROCEDURE — 82306 VITAMIN D 25 HYDROXY: CPT | Performed by: FAMILY MEDICINE

## 2020-02-21 PROCEDURE — 80053 COMPREHEN METABOLIC PANEL: CPT | Performed by: FAMILY MEDICINE

## 2020-02-21 PROCEDURE — 80061 LIPID PANEL: CPT | Performed by: FAMILY MEDICINE

## 2020-02-21 PROCEDURE — 99214 OFFICE O/P EST MOD 30 MIN: CPT | Performed by: FAMILY MEDICINE

## 2020-02-21 RX ORDER — CLOPIDOGREL BISULFATE 75 MG/1
75 TABLET ORAL DAILY
Qty: 90 TABLET | Refills: 1 | Status: SHIPPED | OUTPATIENT
Start: 2020-02-21 | End: 2020-08-21 | Stop reason: SDUPTHER

## 2020-02-21 RX ORDER — RAMIPRIL 10 MG/1
10 CAPSULE ORAL DAILY
Qty: 90 CAPSULE | Refills: 1 | Status: SHIPPED | OUTPATIENT
Start: 2020-02-21 | End: 2020-08-21 | Stop reason: SDUPTHER

## 2020-02-21 NOTE — PROGRESS NOTES
"Subjective   Anabell Vera is a 80 y.o. female.     Chief Complaint   Patient presents with   • Hyperlipidemia     f/u   • Cerebrovascular Accident     f/u resolved       Visit Vitals  /70 (BP Location: Right arm, Patient Position: Sitting, Cuff Size: Adult)   Temp 97.7 °F (36.5 °C)   Ht 157.5 cm (62\")   Wt 60.9 kg (134 lb 3.2 oz)   LMP  (LMP Unknown)   BMI 24.55 kg/m²         Hyperlipidemia   This is a chronic problem. The current episode started more than 1 year ago. The problem is controlled. Recent lipid tests were reviewed and are normal. Exacerbating diseases include chronic renal disease. She has no history of diabetes, hypothyroidism, liver disease, obesity or nephrotic syndrome. There are no known factors aggravating her hyperlipidemia. Associated symptoms include shortness of breath (CANDELARIO on 2nd flight of stairs). Pertinent negatives include no chest pain, focal sensory loss, focal weakness, leg pain or myalgias. Current antihyperlipidemic treatment includes statins. The current treatment provides significant improvement of lipids. There are no compliance problems.  Risk factors for coronary artery disease include dyslipidemia, hypertension, post-menopausal and family history.   Cerebrovascular Accident   The current episode started more than 1 year ago (4 yrs ago). The problem has been resolved. Pertinent negatives include no abdominal pain, anorexia, arthralgias, change in bowel habit, chest pain, chills, congestion, coughing, diaphoresis, fatigue, fever, headaches, joint swelling, myalgias, nausea, neck pain, numbness, rash, sore throat, swollen glands, urinary symptoms, vertigo, visual change, vomiting or weakness. Nothing aggravates the symptoms. Treatments tried: plavix. The treatment provided significant relief.      Pt had one mole removed from Dr Yue Patiño.  Pt is seeing Dr Holden with Nephrology who did lab GFR is better at  49. Pt has single kidney.     Pt has not had lipid lab, LFT or " glucose recently.   Pt had colonoscopy in October with diverticulosis.     Pt has not had any residual from her stroke four yrs ago and is still taking plavix and lipitor.     Pt's blood pressure was systolic 130 in Nephrology office.     The following portions of the patient's history were reviewed and updated as appropriate: allergies, current medications, past family history, past medical history, past social history, past surgical history and problem list.    Past Medical History:   Diagnosis Date   • Abdominal pain, epigastric    • Abdominal pain, RLQ (right lower quadrant)    • Diverticulosis of colon    • Easy bruising     on blood thinners   • Eczema 3/21/2018   • Esophageal reflux    • Fractures    • Gallstones    • GERD (gastroesophageal reflux disease)    • H/O mammogram 08/12/2016   • Hematuria, microscopic    • High cholesterol    • Hypertension    • Malignant neoplasm of skin    • Pap smear for cervical cancer screening 08/14/2015   • PFO (patent foramen ovale) 10/2016    per echo   • Positive TB test    • Post-cholecystectomy syndrome    • 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   • Right kidney mass 2016    found by Dr Cameron Hall   • Single kidney 01/26/2018   • Stroke (CMS/HCC) 10/2016   • Thyroid disease    • Thyroid disease       Past Surgical History:   Procedure Laterality Date   • CHOLECYSTECTOMY  12/04/2012   • COLONOSCOPY  10/10/2019    Dr Mattson, 5 yr repeat family hx colon ca   • NEPHRECTOMY Right 01/26/2018    Oncocytoma      Family History   Problem Relation Age of Onset   • Arthritis Mother    • Diabetes Mother    • Cancer Father    • Colon cancer Father    • Diabetes Sister    • Heart attack Sister    • Diabetes Brother    • Heart attack Brother    • Cancer Brother    • Colon polyps Brother    • Prostate cancer Brother    • Diabetes Son    • Diabetes Maternal Grandmother    • Cervical cancer Maternal Grandmother    • Cancer Brother         on lips   • Breast cancer Neg Hx    •  Ovarian cancer Neg Hx       Social History     Socioeconomic History   • Marital status:      Spouse name: Not on file   • Number of children: Not on file   • Years of education: Not on file   • Highest education level: Not on file   Tobacco Use   • Smoking status: Former Smoker     Years: 2.00     Last attempt to quit: 1964     Years since quittin.1   • Smokeless tobacco: Never Used   • Tobacco comment: 2 years only   Substance and Sexual Activity   • Alcohol use: Yes     Comment: rarely drinks wine   • Drug use: No   • Sexual activity: Defer      Allergies   Allergen Reactions   • Wheat Rash   • Amoxicillin Rash   • Penicillins Rash       Review of Systems   Constitutional: Negative.  Negative for chills, diaphoresis, fatigue and fever.   HENT: Negative.  Negative for congestion, ear pain, nosebleeds, postnasal drip, rhinorrhea, sinus pressure, sneezing and sore throat.    Eyes: Negative.  Negative for redness and itching.   Respiratory: Positive for shortness of breath (CANDELARIO on 2nd flight of stairs). Negative for cough and wheezing.    Cardiovascular: Negative.  Negative for chest pain, palpitations and leg swelling.   Gastrointestinal: Negative.  Negative for abdominal pain, anorexia, change in bowel habit, constipation, diarrhea, nausea and vomiting.   Endocrine: Negative.  Negative for cold intolerance and heat intolerance.   Genitourinary: Negative.  Negative for dysuria, frequency, hematuria and urgency.   Musculoskeletal: Negative.  Negative for arthralgias, back pain, joint swelling, myalgias and neck pain.   Skin: Negative.  Negative for color change and rash.   Allergic/Immunologic: Negative.  Negative for environmental allergies.   Neurological: Negative.  Negative for dizziness, vertigo, focal weakness, syncope, weakness, light-headedness, numbness and headaches.   Hematological: Negative.  Negative for adenopathy. Does not bruise/bleed easily.   Psychiatric/Behavioral: Negative.   Negative for dysphoric mood. The patient is not nervous/anxious.      PHQ-2 Depression Screening  Little interest or pleasure in doing things? 0   Feeling down, depressed, or hopeless? 0   PHQ-2 Total Score 0     The patient has a history of falls. I did complete a risk assessment for falls. A plan of care for falls was documented.      Objective   Physical Exam   Constitutional: She is oriented to person, place, and time. She appears well-developed.   HENT:   Head: Normocephalic.   Right Ear: External ear normal.   Left Ear: External ear normal.   Nose: Nose normal.   Eyes: Pupils are equal, round, and reactive to light. Conjunctivae, EOM and lids are normal.   Neck: Trachea normal and normal range of motion. Neck supple. Carotid bruit is not present. No thyroid mass and no thyromegaly present.   Cardiovascular: Normal rate and regular rhythm.   No murmur heard.  Pulmonary/Chest: Effort normal and breath sounds normal. No respiratory distress. She has no decreased breath sounds. She has no wheezes. She has no rhonchi. She has no rales. She exhibits no tenderness.   Abdominal: Soft. Bowel sounds are normal. There is no tenderness.   Musculoskeletal: Normal range of motion.   Neurological: She is alert and oriented to person, place, and time.   Skin: Skin is warm and dry.   Psychiatric: She has a normal mood and affect. Her behavior is normal.   Nursing note and vitals reviewed.      Assessment/Plan   Anabell was seen today for hyperlipidemia and cerebrovascular accident.    Diagnoses and all orders for this visit:    Essential hypertension  -     ramipril (ALTACE) 10 MG capsule; Take 1 capsule by mouth Daily.  -     Comprehensive Metabolic Panel  -     Lipid Panel    Acute ischemic CVA with resolved right sided deficits  -     clopidogrel (PLAVIX) 75 MG tablet; Take 1 tablet by mouth Daily.    Mixed hyperlipidemia    Vitamin D deficiency  -     Vitamin D 25 Hydroxy      Discussed Shingrix vaccine with pt,  that is  currently available at the pharmacy.     Handout on fall risk  And advanced directive         Current Outpatient Medications:   •  atorvastatin (LIPITOR) 80 MG tablet, Take 0.5 tablets by mouth Every Night., Disp: 45 tablet, Rfl: 5  •  clopidogrel (PLAVIX) 75 MG tablet, Take 1 tablet by mouth Daily., Disp: 90 tablet, Rfl: 1  •  fluticasone (FLONASE) 50 MCG/ACT nasal spray, 2 sprays into each nostril Daily., Disp: , Rfl:   •  Multiple Vitamins-Minerals (MULTI VITAMIN/MINERALS PO), Take  by mouth Daily. Takes every other day, Disp: , Rfl:   •  ramipril (ALTACE) 10 MG capsule, Take 1 capsule by mouth Daily., Disp: 90 capsule, Rfl: 1    Return in about 6 months (around 8/21/2020), or if symptoms worsen or fail to improve, for Medicare Wellness, Recheck.

## 2020-02-21 NOTE — PATIENT INSTRUCTIONS
Fall Prevention in the Home, Adult  Falls can cause injuries and can affect people from all age groups. There are many simple things that you can do to make your home safe and to help prevent falls. Ask for help when making these changes, if needed.  What actions can I take to prevent falls?  General instructions  · Use good lighting in all rooms. Replace any light bulbs that burn out.  · Turn on lights if it is dark. Use night-lights.  · Place frequently used items in easy-to-reach places. Lower the shelves around your home if necessary.  · Set up furniture so that there are clear paths around it. Avoid moving your furniture around.  · Remove throw rugs and other tripping hazards from the floor.  · Avoid walking on wet floors.  · Fix any uneven floor surfaces.  · Add color or contrast paint or tape to grab bars and handrails in your home. Place contrasting color strips on the first and last steps of stairways.  · When you use a stepladder, make sure that it is completely opened and that the sides are firmly locked. Have someone hold the ladder while you are using it. Do not climb a closed stepladder.  · Be aware of any and all pets.  What can I do in the bathroom?         · Keep the floor dry. Immediately clean up any water that spills onto the floor.  · Remove soap buildup in the tub or shower on a regular basis.  · Use non-skid mats or decals on the floor of the tub or shower.  · Attach bath mats securely with double-sided, non-slip rug tape.  · If you need to sit down while you are in the shower, use a plastic, non-slip stool.  · Install grab bars by the toilet and in the tub and shower. Do not use towel bars as grab bars.  What can I do in the bedroom?  · Make sure that a bedside light is easy to reach.  · Do not use oversized bedding that drapes onto the floor.  · Have a firm chair that has side arms to use for getting dressed.  What can I do in the kitchen?  · Clean up any spills right away.  · If you need to  reach for something above you, use a sturdy step stool that has a grab bar.  · Keep electrical cables out of the way.  · Do not use floor polish or wax that makes floors slippery. If you must use wax, make sure that it is non-skid floor wax.  What can I do in the stairways?  · Do not leave any items on the stairs.  · Make sure that you have a light switch at the top of the stairs and the bottom of the stairs. Have them installed if you do not have them.  · Make sure that there are handrails on both sides of the stairs. Fix handrails that are broken or loose. Make sure that handrails are as long as the stairways.  · Install non-slip stair treads on all stairs in your home.  · Avoid having throw rugs at the top or bottom of stairways, or secure the rugs with carpet tape to prevent them from moving.  · Choose a carpet design that does not hide the edge of steps on the stairway.  · Check any carpeting to make sure that it is firmly attached to the stairs. Fix any carpet that is loose or worn.  What can I do on the outside of my home?  · Use bright outdoor lighting.  · Regularly repair the edges of walkways and driveways and fix any cracks.  · Remove high doorway thresholds.  · Trim any shrubbery on the main path into your home.  · Regularly check that handrails are securely fastened and in good repair. Both sides of any steps should have handrails.  · Install guardrails along the edges of any raised decks or porches.  · Clear walkways of debris and clutter, including tools and rocks.  · Have leaves, snow, and ice cleared regularly.  · Use sand or salt on walkways during winter months.  · In the garage, clean up any spills right away, including grease or oil spills.  What other actions can I take?  · Wear closed-toe shoes that fit well and support your feet. Wear shoes that have rubber soles or low heels.  · Use mobility aids as needed, such as canes, walkers, scooters, and crutches.  · Review your medicines with your  health care provider. Some medicines can cause dizziness or changes in blood pressure, which increase your risk of falling.  Talk with your health care provider about other ways that you can decrease your risk of falls. This may include working with a physical therapist or  to improve your strength, balance, and endurance.  Where to find more information  · Centers for Disease Control and Prevention, [place]: https://www.cdc.gov  · National [place] [employer] Aging: https://ly0qtsr.alissa.nih.gov  Contact a health care provider if:  · You are afraid of falling at home.  · You feel weak, drowsy, or dizzy at home.  · You fall at home.  Summary  · There are many simple things that you can do to make your home safe and to help prevent falls.  · Ways to make your home safe include removing tripping hazards and installing grab bars in the bathroom.  · Ask for help when making these changes in your home.  This information is not intended to replace advice given to you by your health care provider. Make sure you discuss any questions you have with your health care provider.  Document Released: 12/08/2003 Document Revised: 08/02/2018 Document Reviewed: 08/02/2018  [employer] Interactive Patient Education © 2020 [employer]  Advance Directive    Advance directives are legal documents that let you make choices ahead of time about your health care and medical treatment in case you become unable to communicate for yourself. Advance directives are a way for you to communicate your wishes to family, friends, and health care providers. This can help convey your decisions about end-of-life care if you become unable to communicate.  Discussing and writing advance directives should happen over time rather than all at once. Advance directives can be changed depending on your situation and what you want, even after you have signed the advance directives.  If you do not have an advance directive, some states assign family decision makers to  act on your behalf based on how closely you are related to them. Each state has its own laws regarding advance directives. You may want to check with your health care provider, , or state representative about the laws in your state. There are different types of advance directives, such as:  · Medical power of .  · Living will.  · Do not resuscitate (DNR) or do not attempt resuscitation (DNAR) order.  Health care proxy and medical power of   A health care proxy, also called a health care agent, is a person who is appointed to make medical decisions for you in cases in which you are unable to make the decisions yourself. Generally, people choose someone they know well and trust to represent their preferences. Make sure to ask this person for an agreement to act as your proxy. A proxy may have to exercise judgment in the event of a medical decision for which your wishes are not known.  A medical power of  is a legal document that names your health care proxy. Depending on the laws in your state, after the document is written, it may also need to be:  · Signed.  · Notarized.  · Dated.  · Copied.  · Witnessed.  · Incorporated into your medical record.  You may also want to appoint someone to manage your financial affairs in a situation in which you are unable to do so. This is called a durable power of  for finances. It is a separate legal document from the durable power of  for health care. You may choose the same person or someone different from your health care proxy to act as your agent in financial matters.  If you do not appoint a proxy, or if there is a concern that the proxy is not acting in your best interests, a court-appointed guardian may be designated to act on your behalf.  Living will  A living will is a set of instructions documenting your wishes about medical care when you cannot express them yourself. Health care providers should keep a copy of your living  will in your medical record. You may want to give a copy to family members or friends. To alert caregivers in case of an emergency, you can place a card in your wallet to let them know that you have a living will and where they can find it. A living will is used if you become:  · Terminally ill.  · Incapacitated.  · Unable to communicate or make decisions.  Items to consider in your living will include:  · The use or non-use of life-sustaining equipment, such as dialysis machines and breathing machines (ventilators).  · A DNR or DNAR order, which is the instruction not to use cardiopulmonary resuscitation (CPR) if breathing or heartbeat stops.  · The use or non-use of tube feeding.  · Withholding of food and fluids.  · Comfort (palliative) care when the goal becomes comfort rather than a cure.  · Organ and tissue donation.  A living will does not give instructions for distributing your money and property if you should pass away. It is recommended that you seek the advice of a  when writing a will. Decisions about taxes, beneficiaries, and asset distribution will be legally binding. This process can relieve your family and friends of any concerns surrounding disputes or questions that may come up about the distribution of your assets.  DNR or DNAR  A DNR or DNAR order is a request not to have CPR in the event that your heart stops beating or you stop breathing. If a DNR or DNAR order has not been made and shared, a health care provider will try to help any patient whose heart has stopped or who has stopped breathing. If you plan to have surgery, talk with your health care provider about how your DNR or DNAR order will be followed if problems occur.  Summary  · Advance directives are the legal documents that allow you to make choices ahead of time about your health care and medical treatment in case you become unable to communicate for yourself.  · The process of discussing and writing advance directives should  happen over time. You can change the advance directives, even after you have signed them.  · Advance directives include DNR or DNAR orders, living leiva, and designating an agent as your medical power of .  This information is not intended to replace advice given to you by your health care provider. Make sure you discuss any questions you have with your health care provider.  Document Released: 03/26/2009 Document Revised: 11/06/2017 Document Reviewed: 11/06/2017  [employer] Interactive Patient Education © 2020 [employer]

## 2020-08-21 ENCOUNTER — OFFICE VISIT (OUTPATIENT)
Dept: INTERNAL MEDICINE | Facility: CLINIC | Age: 80
End: 2020-08-21

## 2020-08-21 ENCOUNTER — LAB (OUTPATIENT)
Dept: LAB | Facility: HOSPITAL | Age: 80
End: 2020-08-21

## 2020-08-21 VITALS
HEART RATE: 75 BPM | SYSTOLIC BLOOD PRESSURE: 122 MMHG | BODY MASS INDEX: 24.99 KG/M2 | HEIGHT: 62 IN | DIASTOLIC BLOOD PRESSURE: 78 MMHG | WEIGHT: 135.8 LBS | TEMPERATURE: 97.9 F | OXYGEN SATURATION: 96 %

## 2020-08-21 DIAGNOSIS — E55.9 VITAMIN D DEFICIENCY: ICD-10-CM

## 2020-08-21 DIAGNOSIS — Z11.59 NEED FOR HEPATITIS C SCREENING TEST: ICD-10-CM

## 2020-08-21 DIAGNOSIS — E78.5 HYPERLIPIDEMIA, UNSPECIFIED HYPERLIPIDEMIA TYPE: ICD-10-CM

## 2020-08-21 DIAGNOSIS — R31.21 ASYMPTOMATIC MICROSCOPIC HEMATURIA: ICD-10-CM

## 2020-08-21 DIAGNOSIS — I10 ESSENTIAL HYPERTENSION: ICD-10-CM

## 2020-08-21 DIAGNOSIS — I10 ESSENTIAL HYPERTENSION: Primary | ICD-10-CM

## 2020-08-21 DIAGNOSIS — I63.9 ACUTE CVA (CEREBROVASCULAR ACCIDENT) (HCC): ICD-10-CM

## 2020-08-21 LAB
BACTERIA UR QL AUTO: NORMAL /HPF
BASOPHILS # BLD AUTO: 0.02 10*3/MM3 (ref 0–0.2)
BASOPHILS NFR BLD AUTO: 0.2 % (ref 0–1.5)
BILIRUB UR QL STRIP: NEGATIVE
CLARITY UR: CLEAR
COLOR UR: YELLOW
DEPRECATED RDW RBC AUTO: 38 FL (ref 37–54)
EOSINOPHIL # BLD AUTO: 0.04 10*3/MM3 (ref 0–0.4)
EOSINOPHIL NFR BLD AUTO: 0.4 % (ref 0.3–6.2)
ERYTHROCYTE [DISTWIDTH] IN BLOOD BY AUTOMATED COUNT: 11.9 % (ref 12.3–15.4)
GLUCOSE UR STRIP-MCNC: NEGATIVE MG/DL
HCT VFR BLD AUTO: 40.1 % (ref 34–46.6)
HGB BLD-MCNC: 13.6 G/DL (ref 12–15.9)
HGB UR QL STRIP.AUTO: ABNORMAL
HYALINE CASTS UR QL AUTO: NORMAL /LPF
IMM GRANULOCYTES # BLD AUTO: 0.04 10*3/MM3 (ref 0–0.05)
IMM GRANULOCYTES NFR BLD AUTO: 0.4 % (ref 0–0.5)
KETONES UR QL STRIP: NEGATIVE
LEUKOCYTE ESTERASE UR QL STRIP.AUTO: ABNORMAL
LYMPHOCYTES # BLD AUTO: 1.17 10*3/MM3 (ref 0.7–3.1)
LYMPHOCYTES NFR BLD AUTO: 12.8 % (ref 19.6–45.3)
MCH RBC QN AUTO: 30.2 PG (ref 26.6–33)
MCHC RBC AUTO-ENTMCNC: 33.9 G/DL (ref 31.5–35.7)
MCV RBC AUTO: 89.1 FL (ref 79–97)
MONOCYTES # BLD AUTO: 0.96 10*3/MM3 (ref 0.1–0.9)
MONOCYTES NFR BLD AUTO: 10.5 % (ref 5–12)
NEUTROPHILS NFR BLD AUTO: 6.93 10*3/MM3 (ref 1.7–7)
NEUTROPHILS NFR BLD AUTO: 75.7 % (ref 42.7–76)
NITRITE UR QL STRIP: NEGATIVE
NRBC BLD AUTO-RTO: 0 /100 WBC (ref 0–0.2)
PH UR STRIP.AUTO: 7 [PH] (ref 5–8)
PLATELET # BLD AUTO: 134 10*3/MM3 (ref 140–450)
PMV BLD AUTO: 12 FL (ref 6–12)
PROT UR QL STRIP: NEGATIVE
RBC # BLD AUTO: 4.5 10*6/MM3 (ref 3.77–5.28)
RBC # UR: NORMAL /HPF
REF LAB TEST METHOD: NORMAL
SP GR UR STRIP: 1.01 (ref 1–1.03)
SQUAMOUS #/AREA URNS HPF: NORMAL /HPF
UROBILINOGEN UR QL STRIP: ABNORMAL
WBC # BLD AUTO: 9.16 10*3/MM3 (ref 3.4–10.8)
WBC UR QL AUTO: NORMAL /HPF

## 2020-08-21 PROCEDURE — 81001 URINALYSIS AUTO W/SCOPE: CPT

## 2020-08-21 PROCEDURE — 80053 COMPREHEN METABOLIC PANEL: CPT

## 2020-08-21 PROCEDURE — 80061 LIPID PANEL: CPT

## 2020-08-21 PROCEDURE — 86803 HEPATITIS C AB TEST: CPT | Performed by: FAMILY MEDICINE

## 2020-08-21 PROCEDURE — 99214 OFFICE O/P EST MOD 30 MIN: CPT | Performed by: FAMILY MEDICINE

## 2020-08-21 PROCEDURE — 84443 ASSAY THYROID STIM HORMONE: CPT

## 2020-08-21 PROCEDURE — 82306 VITAMIN D 25 HYDROXY: CPT

## 2020-08-21 PROCEDURE — 85025 COMPLETE CBC W/AUTO DIFF WBC: CPT

## 2020-08-21 RX ORDER — ATORVASTATIN CALCIUM 80 MG/1
40 TABLET, FILM COATED ORAL NIGHTLY
Qty: 45 TABLET | Refills: 5 | Status: SHIPPED | OUTPATIENT
Start: 2020-08-21 | End: 2021-08-24

## 2020-08-21 RX ORDER — RAMIPRIL 10 MG/1
10 CAPSULE ORAL DAILY
Qty: 90 CAPSULE | Refills: 1 | Status: SHIPPED | OUTPATIENT
Start: 2020-08-21 | End: 2021-02-12 | Stop reason: SDUPTHER

## 2020-08-21 RX ORDER — CLOPIDOGREL BISULFATE 75 MG/1
75 TABLET ORAL DAILY
Qty: 90 TABLET | Refills: 1 | Status: SHIPPED | OUTPATIENT
Start: 2020-08-21 | End: 2021-03-03 | Stop reason: SDUPTHER

## 2020-08-21 NOTE — PROGRESS NOTES
"Subjective   Anabell Vera is a 80 y.o. female.     Chief Complaint   Patient presents with   • Hyperlipidemia     f/u   • Hypertension       Visit Vitals  /78 (BP Location: Right arm, Patient Position: Sitting, Cuff Size: Adult)   Pulse 75   Temp 97.9 °F (36.6 °C) (Infrared)   Ht 157.5 cm (62\")   Wt 61.6 kg (135 lb 12.8 oz)   LMP  (LMP Unknown)   SpO2 96%   BMI 24.84 kg/m²         History of Present Illness   Pt as single kidney s/p renal oncocytoma removal.  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  Pt has hx of CVA and is taking plavix.    Pt has HTN and is tolerating the ramipril without side effects.  Pt has hyperlipidemia and is tolerating lipitor.     The following portions of the patient's history were reviewed and updated as appropriate: allergies, current medications, past family history, past medical history, past social history, past surgical history and problem list.    Past Medical History:   Diagnosis Date   • Abdominal pain, epigastric    • Abdominal pain, RLQ (right lower quadrant)    • Basal cell carcinoma (BCC) in situ of skin     left temple   • Diverticulosis of colon    • Easy bruising     on blood thinners   • Eczema 3/21/2018   • Esophageal reflux    • Fractures    • Gallstones    • GERD (gastroesophageal reflux disease)    • H/O mammogram 08/12/2016   • Hematuria, microscopic    • High cholesterol    • Hypertension    • Malignant neoplasm of skin    • Pap smear for cervical cancer screening 08/14/2015   • PFO (patent foramen ovale) 10/2016    per echo   • Positive TB test    • Post-cholecystectomy syndrome    • 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   • Right kidney mass 2016    found by Dr Cameron Hall   • Single kidney 01/26/2018   • Stroke (CMS/HCC) 10/2016   • Thyroid disease    • Thyroid disease       Past Surgical History:   Procedure Laterality Date   • CHOLECYSTECTOMY  12/04/2012   • COLONOSCOPY  10/10/2019    Dr Mattson, 5 yr repeat family hx colon ca   • " NEPHRECTOMY Right 2018    Oncocytoma   • SKIN CANCER EXCISION     • WISDOM TOOTH EXTRACTION        Family History   Problem Relation Age of Onset   • Arthritis Mother    • Diabetes Mother    • Cancer Father    • Colon cancer Father    • Diabetes Sister    • Heart attack Sister    • Diabetes Brother    • Heart attack Brother    • Cancer Brother    • Colon polyps Brother    • Prostate cancer Brother    • Diabetes Son    • Diabetes Maternal Grandmother    • Cervical cancer Maternal Grandmother    • Cancer Brother         on lips   • Breast cancer Neg Hx    • Ovarian cancer Neg Hx       Social History     Socioeconomic History   • Marital status:      Spouse name: Not on file   • Number of children: Not on file   • Years of education: Not on file   • Highest education level: Not on file   Tobacco Use   • Smoking status: Former Smoker     Years: 2.00     Last attempt to quit: 1964     Years since quittin.6   • Smokeless tobacco: Never Used   • Tobacco comment: 2 years only   Substance and Sexual Activity   • Alcohol use: Yes     Comment: rarely drinks wine   • Drug use: No   • Sexual activity: Defer      Allergies   Allergen Reactions   • Wheat Rash   • Amoxicillin Rash   • Penicillins Rash       Review of Systems   Constitutional: Negative.  Negative for chills, diaphoresis, fatigue and fever.   HENT: Negative.  Negative for ear pain, nosebleeds, postnasal drip, rhinorrhea, sinus pressure, sneezing and sore throat.    Eyes: Negative.  Negative for redness and itching.   Respiratory: Negative.  Negative for cough, shortness of breath and wheezing.    Cardiovascular: Negative.  Negative for chest pain and palpitations.   Gastrointestinal: Positive for blood in stool (passed a stool ball with blood) and constipation (using miralax). Negative for abdominal pain, diarrhea, nausea and vomiting.   Endocrine: Negative.  Negative for cold intolerance and heat intolerance.   Genitourinary: Negative.  Negative  for dysuria, frequency, hematuria and urgency.   Musculoskeletal: Negative.  Negative for arthralgias, back pain and neck pain.   Skin: Negative.  Negative for color change and rash.   Allergic/Immunologic: Positive for environmental allergies.   Neurological: Negative.  Negative for dizziness, syncope, light-headedness and headaches.   Hematological: Negative for adenopathy. Bruises/bleeds easily (from plavix).   Psychiatric/Behavioral: Negative.  Negative for dysphoric mood. The patient is not nervous/anxious.        Objective   Physical Exam   Constitutional: She is oriented to person, place, and time. She appears well-developed.   HENT:   Head: Normocephalic.   Right Ear: External ear normal.   Left Ear: External ear normal.   Nose: Nose normal.   Eyes: Pupils are equal, round, and reactive to light. Conjunctivae, EOM and lids are normal.   Neck: Trachea normal and normal range of motion. Neck supple. Carotid bruit is not present. No thyroid mass and no thyromegaly present.   Cardiovascular: Normal rate and regular rhythm.   No murmur heard.  Pulmonary/Chest: Effort normal and breath sounds normal. No respiratory distress. She has no decreased breath sounds. She has no wheezes. She has no rhonchi. She has no rales. She exhibits no tenderness.   Abdominal: Soft. Bowel sounds are normal. There is no tenderness.   Musculoskeletal: Normal range of motion.   Neurological: She is alert and oriented to person, place, and time.   Skin: Skin is warm and dry.   Psychiatric: She has a normal mood and affect. Her behavior is normal.   Nursing note and vitals reviewed.      Assessment/Plan   Anabell was seen today for hyperlipidemia and hypertension.    Diagnoses and all orders for this visit:    Essential hypertension  -     ramipril (ALTACE) 10 MG capsule; Take 1 capsule by mouth Daily.  -     Comprehensive Metabolic Panel; Future  -     CBC & Differential; Future  -     Lipid Panel; Future  -     TSH; Future    Acute  ischemic CVA with resolved right sided deficits  -     clopidogrel (PLAVIX) 75 MG tablet; Take 1 tablet by mouth Daily.    Hyperlipidemia, unspecified hyperlipidemia type  -     atorvastatin (LIPITOR) 80 MG tablet; Take 0.5 tablets by mouth Every Night.  -     Comprehensive Metabolic Panel; Future  -     Lipid Panel; Future    Asymptomatic microscopic hematuria  -     Urinalysis With Microscopic - Urine, Clean Catch; Future    Vitamin D deficiency  -     Vitamin D 25 Hydroxy; Future    Need for hepatitis C screening test  -     HCV Antibody Rfx To Qnt PCR                   Current Outpatient Medications:   •  atorvastatin (LIPITOR) 80 MG tablet, Take 0.5 tablets by mouth Every Night., Disp: 45 tablet, Rfl: 5  •  clopidogrel (PLAVIX) 75 MG tablet, Take 1 tablet by mouth Daily., Disp: 90 tablet, Rfl: 1  •  fluticasone (FLONASE) 50 MCG/ACT nasal spray, 2 sprays into each nostril Daily., Disp: , Rfl:   •  Multiple Vitamins-Minerals (MULTI VITAMIN/MINERALS PO), Take  by mouth Daily. Takes every other day, Disp: , Rfl:   •  NON FORMULARY, Calcium, magnesium, citate with vit d3 (liquid), Disp: , Rfl:   •  ramipril (ALTACE) 10 MG capsule, Take 1 capsule by mouth Daily., Disp: 90 capsule, Rfl: 1    Return in about 6 months (around 2/21/2021), or if symptoms worsen or fail to improve, for Recheck.

## 2020-08-22 DIAGNOSIS — E87.1 HYPONATREMIA: ICD-10-CM

## 2020-08-22 DIAGNOSIS — D69.6 TEMPORARY LOW PLATELET COUNT (HCC): ICD-10-CM

## 2020-08-22 DIAGNOSIS — N18.30 STAGE 3 CHRONIC KIDNEY DISEASE (HCC): Primary | ICD-10-CM

## 2020-08-22 LAB
25(OH)D3 SERPL-MCNC: 59.1 NG/ML (ref 30–100)
ALBUMIN SERPL-MCNC: 4.4 G/DL (ref 3.5–5.2)
ALBUMIN/GLOB SERPL: 1.6 G/DL
ALP SERPL-CCNC: 72 U/L (ref 39–117)
ALT SERPL W P-5'-P-CCNC: 12 U/L (ref 1–33)
ANION GAP SERPL CALCULATED.3IONS-SCNC: 6.5 MMOL/L (ref 5–15)
AST SERPL-CCNC: 19 U/L (ref 1–32)
BILIRUB SERPL-MCNC: 0.7 MG/DL (ref 0–1.2)
BUN SERPL-MCNC: 11 MG/DL (ref 8–23)
BUN/CREAT SERPL: 10.1 (ref 7–25)
CALCIUM SPEC-SCNC: 9.9 MG/DL (ref 8.6–10.5)
CHLORIDE SERPL-SCNC: 93 MMOL/L (ref 98–107)
CHOLEST SERPL-MCNC: 110 MG/DL (ref 0–200)
CO2 SERPL-SCNC: 27.5 MMOL/L (ref 22–29)
CREAT SERPL-MCNC: 1.09 MG/DL (ref 0.57–1)
GFR SERPL CREATININE-BSD FRML MDRD: 48 ML/MIN/1.73
GLOBULIN UR ELPH-MCNC: 2.7 GM/DL
GLUCOSE SERPL-MCNC: 99 MG/DL (ref 65–99)
HDLC SERPL-MCNC: 52 MG/DL (ref 40–60)
LDLC SERPL CALC-MCNC: 44 MG/DL (ref 0–100)
LDLC/HDLC SERPL: 0.85 {RATIO}
POTASSIUM SERPL-SCNC: 4.8 MMOL/L (ref 3.5–5.2)
PROT SERPL-MCNC: 7.1 G/DL (ref 6–8.5)
SODIUM SERPL-SCNC: 127 MMOL/L (ref 136–145)
TRIGL SERPL-MCNC: 68 MG/DL (ref 0–150)
TSH SERPL DL<=0.05 MIU/L-ACNC: 3.54 UIU/ML (ref 0.27–4.2)
VLDLC SERPL-MCNC: 13.6 MG/DL (ref 5–40)

## 2020-08-23 LAB
HCV AB S/CO SERPL IA: <0.1 S/CO RATIO (ref 0–0.9)
INTERPRETATION: NORMAL

## 2020-08-24 ENCOUNTER — TELEPHONE (OUTPATIENT)
Dept: INTERNAL MEDICINE | Facility: CLINIC | Age: 80
End: 2020-08-24

## 2020-08-24 NOTE — TELEPHONE ENCOUNTER
Pt notified and verbalized understanding. Asking for a letter excusing her from jury duty as well. Labs faxed to Dennis's office.

## 2020-08-24 NOTE — TELEPHONE ENCOUNTER
----- Message from Ranjana Grace MA sent at 8/24/2020  7:53 AM EDT -----      ----- Message -----  From: Alayna Toscano MD  Sent: 8/22/2020   8:55 PM EDT  To: [facility]  Clinical Pool    Sodium and chloride levels are low.  If she is severely restricting her salt intake?  This can cause muscle cramping if it is too low and a severely low can cause heart rhythm disturbances.  Please add a little salt into the diet.  Recheck levels in about a month.  Platelet count is low.  Will also put order for recheck on that in the computer for next month.  Urine continues to show blood.  GFR continues about the same level but decreased.    Please send copy of labs to Dr. Dodge in nephrology

## 2020-08-26 NOTE — TELEPHONE ENCOUNTER
LVM to rtn call to give reason what she needs the letter for so [place] can write her jury duty letter

## 2020-08-26 NOTE — TELEPHONE ENCOUNTER
Pt states that she has had a stroke, kidney removed, hole in the heart, bp increases with stress and age related issues. Advised that I would let Dr. Toscano know.

## 2020-09-23 ENCOUNTER — LAB (OUTPATIENT)
Dept: LAB | Facility: HOSPITAL | Age: 80
End: 2020-09-23

## 2020-09-23 DIAGNOSIS — E87.1 HYPONATREMIA: ICD-10-CM

## 2020-09-23 DIAGNOSIS — N18.30 STAGE 3 CHRONIC KIDNEY DISEASE (HCC): ICD-10-CM

## 2020-09-23 DIAGNOSIS — D69.6 TEMPORARY LOW PLATELET COUNT (HCC): ICD-10-CM

## 2020-09-23 LAB
BASOPHILS # BLD AUTO: 0.01 10*3/MM3 (ref 0–0.2)
BASOPHILS NFR BLD AUTO: 0.2 % (ref 0–1.5)
DEPRECATED RDW RBC AUTO: 38.5 FL (ref 37–54)
EOSINOPHIL # BLD AUTO: 0.08 10*3/MM3 (ref 0–0.4)
EOSINOPHIL NFR BLD AUTO: 1.5 % (ref 0.3–6.2)
ERYTHROCYTE [DISTWIDTH] IN BLOOD BY AUTOMATED COUNT: 12 % (ref 12.3–15.4)
HCT VFR BLD AUTO: 40 % (ref 34–46.6)
HGB BLD-MCNC: 13.8 G/DL (ref 12–15.9)
IMM GRANULOCYTES # BLD AUTO: 0.02 10*3/MM3 (ref 0–0.05)
IMM GRANULOCYTES NFR BLD AUTO: 0.4 % (ref 0–0.5)
LYMPHOCYTES # BLD AUTO: 1.19 10*3/MM3 (ref 0.7–3.1)
LYMPHOCYTES NFR BLD AUTO: 22.5 % (ref 19.6–45.3)
MCH RBC QN AUTO: 29.9 PG (ref 26.6–33)
MCHC RBC AUTO-ENTMCNC: 34.5 G/DL (ref 31.5–35.7)
MCV RBC AUTO: 86.8 FL (ref 79–97)
MONOCYTES # BLD AUTO: 0.61 10*3/MM3 (ref 0.1–0.9)
MONOCYTES NFR BLD AUTO: 11.5 % (ref 5–12)
NEUTROPHILS NFR BLD AUTO: 3.39 10*3/MM3 (ref 1.7–7)
NEUTROPHILS NFR BLD AUTO: 63.9 % (ref 42.7–76)
NRBC BLD AUTO-RTO: 0 /100 WBC (ref 0–0.2)
PLATELET # BLD AUTO: 161 10*3/MM3 (ref 140–450)
PMV BLD AUTO: 12.2 FL (ref 6–12)
RBC # BLD AUTO: 4.61 10*6/MM3 (ref 3.77–5.28)
WBC # BLD AUTO: 5.3 10*3/MM3 (ref 3.4–10.8)

## 2020-09-23 PROCEDURE — 84466 ASSAY OF TRANSFERRIN: CPT

## 2020-09-23 PROCEDURE — 83540 ASSAY OF IRON: CPT

## 2020-09-23 PROCEDURE — 85025 COMPLETE CBC W/AUTO DIFF WBC: CPT

## 2020-09-23 PROCEDURE — 80053 COMPREHEN METABOLIC PANEL: CPT

## 2020-09-24 LAB
ALBUMIN SERPL-MCNC: 4.5 G/DL (ref 3.5–5.2)
ALBUMIN/GLOB SERPL: 2 G/DL
ALP SERPL-CCNC: 75 U/L (ref 39–117)
ALT SERPL W P-5'-P-CCNC: 14 U/L (ref 1–33)
ANION GAP SERPL CALCULATED.3IONS-SCNC: 9.5 MMOL/L (ref 5–15)
AST SERPL-CCNC: 20 U/L (ref 1–32)
BILIRUB SERPL-MCNC: 0.4 MG/DL (ref 0–1.2)
BUN SERPL-MCNC: 14 MG/DL (ref 8–23)
BUN/CREAT SERPL: 13 (ref 7–25)
CALCIUM SPEC-SCNC: 9.5 MG/DL (ref 8.6–10.5)
CHLORIDE SERPL-SCNC: 99 MMOL/L (ref 98–107)
CO2 SERPL-SCNC: 27.5 MMOL/L (ref 22–29)
CREAT SERPL-MCNC: 1.08 MG/DL (ref 0.57–1)
GFR SERPL CREATININE-BSD FRML MDRD: 49 ML/MIN/1.73
GLOBULIN UR ELPH-MCNC: 2.3 GM/DL
GLUCOSE SERPL-MCNC: 108 MG/DL (ref 65–99)
IRON 24H UR-MRATE: 133 MCG/DL (ref 37–145)
IRON SATN MFR SERPL: 35 % (ref 20–50)
POTASSIUM SERPL-SCNC: 4.8 MMOL/L (ref 3.5–5.2)
PROT SERPL-MCNC: 6.8 G/DL (ref 6–8.5)
SODIUM SERPL-SCNC: 136 MMOL/L (ref 136–145)
TIBC SERPL-MCNC: 381 MCG/DL (ref 298–536)
TRANSFERRIN SERPL-MCNC: 256 MG/DL (ref 200–360)

## 2021-02-12 DIAGNOSIS — I10 ESSENTIAL HYPERTENSION: ICD-10-CM

## 2021-02-12 NOTE — TELEPHONE ENCOUNTER
Caller: Anabell Vera    Relationship: Self    Best call back number: [phone]    Medication needed:   Requested Prescriptions     Pending Prescriptions Disp Refills   • ramipril (ALTACE) 10 MG capsule 90 capsule 1     Sig: Take 1 capsule by mouth Daily.       When do you need the refill by: ASAP    What details did the patient provide when requesting the medication: PT HAS AN APPT ON 3/3 BUT NEEDS A REFILL BEFORE THEN. PT HAS 6 TABLETS LEFT. PT STATES THAT SHE WILL BE OUT OF THE MEDICATIONS BELOW ON THE 3/6 AND WANTED TO HAVE THE REFILL ON HOLD   atorvastatin (LIPITOR) 80 MG tablet  clopidogrel (PLAVIX) 75 MG tablet    Does the patient have less than a 3 day supply:  [] Yes  [x] No    What is the patient's preferred pharmacy: [id number] - [place], [place] - [address] AT [facility] [address] [facility] [phone] [facility] [phone] FX

## 2021-02-15 RX ORDER — RAMIPRIL 10 MG/1
10 CAPSULE ORAL DAILY
Qty: 90 CAPSULE | Refills: 0 | Status: SHIPPED | OUTPATIENT
Start: 2021-02-15 | End: 2021-03-03 | Stop reason: SDUPTHER

## 2021-03-03 ENCOUNTER — OFFICE VISIT (OUTPATIENT)
Dept: INTERNAL MEDICINE | Facility: CLINIC | Age: 81
End: 2021-03-03

## 2021-03-03 ENCOUNTER — LAB (OUTPATIENT)
Dept: LAB | Facility: HOSPITAL | Age: 81
End: 2021-03-03

## 2021-03-03 VITALS
BODY MASS INDEX: 25.73 KG/M2 | OXYGEN SATURATION: 99 % | TEMPERATURE: 96.8 F | SYSTOLIC BLOOD PRESSURE: 160 MMHG | HEART RATE: 55 BPM | WEIGHT: 139.8 LBS | RESPIRATION RATE: 16 BRPM | HEIGHT: 62 IN | DIASTOLIC BLOOD PRESSURE: 70 MMHG

## 2021-03-03 DIAGNOSIS — E78.5 HYPERLIPIDEMIA, UNSPECIFIED HYPERLIPIDEMIA TYPE: ICD-10-CM

## 2021-03-03 DIAGNOSIS — R31.21 ASYMPTOMATIC MICROSCOPIC HEMATURIA: ICD-10-CM

## 2021-03-03 DIAGNOSIS — I63.9 ACUTE CVA (CEREBROVASCULAR ACCIDENT) (HCC): ICD-10-CM

## 2021-03-03 DIAGNOSIS — I10 ESSENTIAL HYPERTENSION: Primary | ICD-10-CM

## 2021-03-03 DIAGNOSIS — E55.9 VITAMIN D DEFICIENCY: ICD-10-CM

## 2021-03-03 DIAGNOSIS — R73.09 ABNORMAL GLUCOSE: ICD-10-CM

## 2021-03-03 DIAGNOSIS — M79.671 RIGHT FOOT PAIN: ICD-10-CM

## 2021-03-03 DIAGNOSIS — I10 ESSENTIAL HYPERTENSION: ICD-10-CM

## 2021-03-03 LAB
25(OH)D3 SERPL-MCNC: 51.7 NG/ML
BASOPHILS # BLD AUTO: 0.03 10*3/MM3 (ref 0–0.2)
BASOPHILS NFR BLD AUTO: 0.5 % (ref 0–1.5)
BILIRUB BLD-MCNC: NEGATIVE MG/DL
CHOLEST SERPL-MCNC: 143 MG/DL (ref 0–200)
CLARITY, POC: CLEAR
COLOR UR: YELLOW
DEPRECATED RDW RBC AUTO: 38.7 FL (ref 37–54)
EOSINOPHIL # BLD AUTO: 0.04 10*3/MM3 (ref 0–0.4)
EOSINOPHIL NFR BLD AUTO: 0.6 % (ref 0.3–6.2)
ERYTHROCYTE [DISTWIDTH] IN BLOOD BY AUTOMATED COUNT: 12.2 % (ref 12.3–15.4)
GLUCOSE UR STRIP-MCNC: NEGATIVE MG/DL
HBA1C MFR BLD: 6.04 % (ref 4.8–5.6)
HCT VFR BLD AUTO: 40.8 % (ref 34–46.6)
HDLC SERPL-MCNC: 56 MG/DL (ref 40–60)
HGB BLD-MCNC: 14 G/DL (ref 12–15.9)
IMM GRANULOCYTES # BLD AUTO: 0.03 10*3/MM3 (ref 0–0.05)
IMM GRANULOCYTES NFR BLD AUTO: 0.5 % (ref 0–0.5)
KETONES UR QL: NEGATIVE
LDLC SERPL CALC-MCNC: 71 MG/DL (ref 0–100)
LDLC/HDLC SERPL: 1.24 {RATIO}
LEUKOCYTE EST, POC: NEGATIVE
LYMPHOCYTES # BLD AUTO: 1.19 10*3/MM3 (ref 0.7–3.1)
LYMPHOCYTES NFR BLD AUTO: 18.4 % (ref 19.6–45.3)
MCH RBC QN AUTO: 30.2 PG (ref 26.6–33)
MCHC RBC AUTO-ENTMCNC: 34.3 G/DL (ref 31.5–35.7)
MCV RBC AUTO: 88.1 FL (ref 79–97)
MONOCYTES # BLD AUTO: 0.86 10*3/MM3 (ref 0.1–0.9)
MONOCYTES NFR BLD AUTO: 13.3 % (ref 5–12)
NEUTROPHILS NFR BLD AUTO: 4.33 10*3/MM3 (ref 1.7–7)
NEUTROPHILS NFR BLD AUTO: 66.7 % (ref 42.7–76)
NITRITE UR-MCNC: NEGATIVE MG/ML
NRBC BLD AUTO-RTO: 0 /100 WBC (ref 0–0.2)
PH UR: 5.5 [PH] (ref 5–8)
PLATELET # BLD AUTO: 169 10*3/MM3 (ref 140–450)
PMV BLD AUTO: 11.6 FL (ref 6–12)
PROT UR STRIP-MCNC: NEGATIVE MG/DL
RBC # BLD AUTO: 4.63 10*6/MM3 (ref 3.77–5.28)
RBC # UR STRIP: ABNORMAL /UL
SP GR UR: 1.01 (ref 1–1.03)
TRIGL SERPL-MCNC: 87 MG/DL (ref 0–150)
UROBILINOGEN UR QL: NORMAL
VLDLC SERPL-MCNC: 16 MG/DL (ref 5–40)
WBC # BLD AUTO: 6.48 10*3/MM3 (ref 3.4–10.8)

## 2021-03-03 PROCEDURE — 80061 LIPID PANEL: CPT | Performed by: FAMILY MEDICINE

## 2021-03-03 PROCEDURE — 85025 COMPLETE CBC W/AUTO DIFF WBC: CPT | Performed by: FAMILY MEDICINE

## 2021-03-03 PROCEDURE — 82306 VITAMIN D 25 HYDROXY: CPT | Performed by: FAMILY MEDICINE

## 2021-03-03 PROCEDURE — 83036 HEMOGLOBIN GLYCOSYLATED A1C: CPT | Performed by: FAMILY MEDICINE

## 2021-03-03 PROCEDURE — 81003 URINALYSIS AUTO W/O SCOPE: CPT | Performed by: FAMILY MEDICINE

## 2021-03-03 PROCEDURE — 99214 OFFICE O/P EST MOD 30 MIN: CPT | Performed by: FAMILY MEDICINE

## 2021-03-03 PROCEDURE — 80053 COMPREHEN METABOLIC PANEL: CPT | Performed by: FAMILY MEDICINE

## 2021-03-03 RX ORDER — ATORVASTATIN CALCIUM 80 MG/1
40 TABLET, FILM COATED ORAL NIGHTLY
Qty: 45 TABLET | Refills: 5 | Status: CANCELLED | OUTPATIENT
Start: 2021-03-03

## 2021-03-03 RX ORDER — CARVEDILOL 3.12 MG/1
3.12 TABLET ORAL 2 TIMES DAILY WITH MEALS
Qty: 60 TABLET | Refills: 3 | Status: SHIPPED | OUTPATIENT
Start: 2021-03-03 | End: 2021-06-30

## 2021-03-03 RX ORDER — RAMIPRIL 10 MG/1
10 CAPSULE ORAL DAILY
Qty: 90 CAPSULE | Refills: 1 | Status: SHIPPED | OUTPATIENT
Start: 2021-03-03 | End: 2021-08-24

## 2021-03-03 RX ORDER — ECHINACEA PURPUREA EXTRACT 125 MG
1 TABLET ORAL AS NEEDED
COMMUNITY
End: 2022-06-30

## 2021-03-03 RX ORDER — CLOPIDOGREL BISULFATE 75 MG/1
75 TABLET ORAL DAILY
Qty: 90 TABLET | Refills: 1 | Status: SHIPPED | OUTPATIENT
Start: 2021-03-03 | End: 2021-08-24

## 2021-03-03 NOTE — PROGRESS NOTES
"Subjective   Anabell Vera is a 81 y.o. female.     Chief Complaint   Patient presents with   • Hypertension   • Hyperlipidemia     fasting    • Vitamin D Deficiency   • Foot Pain     swelling, painful, injured x2.5 weeks ago, slipped on ice       Visit Vitals  /70 (BP Location: Right arm, Patient Position: Sitting, Cuff Size: Adult)   Pulse 55   Temp 96.8 °F (36 °C) (Infrared)   Resp 16   Ht 157.5 cm (62.01\")   Wt 63.4 kg (139 lb 12.8 oz)   LMP  (LMP Unknown)   SpO2 99%   BMI 25.56 kg/m²       Vitals:    03/03/21 1417 03/03/21 1459   BP: 152/82 160/70   BP Location:  Right arm   Patient Position:  Sitting   Cuff Size:  Adult   Pulse: 55    Resp: 16    Temp: 96.8 °F (36 °C)    TempSrc: Infrared    SpO2: 99%    Weight: 63.4 kg (139 lb 12.8 oz)    Height: 157.5 cm (62.01\")        Hypertension  This is a chronic problem. The current episode started more than 1 year ago. Associated symptoms include peripheral edema (right foot). Pertinent negatives include no anxiety, blurred vision, chest pain, headaches, malaise/fatigue, neck pain, orthopnea, palpitations, PND, shortness of breath or sweats. There are no associated agents to hypertension. Risk factors for coronary artery disease include dyslipidemia, family history and post-menopausal state. Current antihypertension treatment includes ACE inhibitors. The current treatment provides moderate improvement. Hypertensive end-organ damage includes kidney disease, CVA and PVD. There is no history of angina, CAD/MI, heart failure, left ventricular hypertrophy or retinopathy. Identifiable causes of hypertension include chronic renal disease. There is no history of sleep apnea or a thyroid problem.   Hyperlipidemia  This is a chronic problem. The current episode started more than 1 year ago. The problem is controlled. Recent lipid tests were reviewed and are normal. Exacerbating diseases include chronic renal disease. She has no history of diabetes, hypothyroidism, liver " disease, obesity or nephrotic syndrome. Pertinent negatives include no chest pain, focal sensory loss, focal weakness, leg pain, myalgias or shortness of breath. Current antihyperlipidemic treatment includes statins. The current treatment provides significant improvement of lipids. There are no compliance problems.  Risk factors for coronary artery disease include dyslipidemia, hypertension, post-menopausal and family history.   Foot Pain  This is a new problem. The current episode started 1 to 4 weeks ago. The problem occurs constantly. The problem has been gradually improving. Associated symptoms include arthralgias (left elbow she fell). Pertinent negatives include no abdominal pain, anorexia, change in bowel habit, chest pain, chills, congestion, coughing, diaphoresis, fatigue, fever, headaches, joint swelling, myalgias, nausea, neck pain, numbness, rash, sore throat, swollen glands, urinary symptoms, vertigo, visual change, vomiting or weakness. The symptoms are aggravated by walking, standing and twisting. She has tried acetaminophen for the symptoms. The treatment provided moderate relief.      Pt slipped on the ice 2.5 weeks ago and still has pain right foot, medial aspect.  Pt has hx of elevated glucose.  Pt has hx of decreased GFR.  Patient states the hematuria has persisted even after removal of kidney.  The following portions of the patient's history were reviewed and updated as appropriate: allergies, current medications, past family history, past medical history, past social history, past surgical history and problem list.    Past Medical History:   Diagnosis Date   • Abdominal pain, epigastric    • Abdominal pain, RLQ (right lower quadrant)    • Basal cell carcinoma (BCC) in situ of skin     left temple   • Diverticulosis of colon    • Easy bruising     on blood thinners   • Eczema 3/21/2018   • Esophageal reflux    • Fractures    • Gallstones    • GERD (gastroesophageal reflux disease)    • H/O  mammogram 2016   • Hematuria, microscopic    • High cholesterol    • Hypertension    • Malignant neoplasm of skin    • Pap smear for cervical cancer screening 2015   • PFO (patent foramen ovale) 10/2016    per echo   • Positive TB test    • Post-cholecystectomy syndrome    • Renal oncocytoma of right kidney 2018   • Right kidney mass 2016    found by Dr Cameron Hall   • Single kidney 2018   • Stroke (CMS/HCC) 10/2016   • Thyroid disease    • Thyroid disease       Past Surgical History:   Procedure Laterality Date   • CHOLECYSTECTOMY  2012   • COLONOSCOPY  10/10/2019    Dr Mattson, 5 yr repeat family hx colon ca   • NEPHRECTOMY Right 2018    Oncocytoma   • SKIN CANCER EXCISION     • WISDOM TOOTH EXTRACTION        Family History   Problem Relation Age of Onset   • Arthritis Mother    • Diabetes Mother    • Cancer Father    • Colon cancer Father    • Diabetes Sister    • Heart attack Sister    • Diabetes Brother    • Heart attack Brother    • Cancer Brother    • Colon polyps Brother    • Prostate cancer Brother    • Diabetes Son    • Diabetes Maternal Grandmother    • Cervical cancer Maternal Grandmother    • Cancer Brother         on lips   • Breast cancer Neg Hx    • Ovarian cancer Neg Hx       Social History     Socioeconomic History   • Marital status:      Spouse name: Not on file   • Number of children: Not on file   • Years of education: Not on file   • Highest education level: Not on file   Tobacco Use   • Smoking status: Former Smoker     Years: 2.00     Quit date: 1964     Years since quittin.2   • Smokeless tobacco: Never Used   • Tobacco comment: 2 years only   Substance and Sexual Activity   • Alcohol use: Yes     Comment: rarely drinks wine   • Drug use: No   • Sexual activity: Defer      Allergies   Allergen Reactions   • Wheat Rash   • Amoxicillin Rash   • Penicillins Rash       Review of Systems   Constitutional: Negative for chills, diaphoresis, fatigue,  fever and malaise/fatigue.   HENT: Negative for congestion and sore throat.    Eyes: Negative for blurred vision.   Respiratory: Negative for cough and shortness of breath.    Cardiovascular: Negative for chest pain, palpitations, orthopnea and PND.   Gastrointestinal: Negative for abdominal pain, anorexia, change in bowel habit, nausea and vomiting.   Musculoskeletal: Positive for arthralgias (left elbow she fell) and gait problem (right foot). Negative for joint swelling, myalgias and neck pain.        Foot pain right   Skin: Negative for rash.   Neurological: Negative for vertigo, focal weakness, weakness, numbness and headaches.       Objective   Physical Exam  Vitals signs and nursing note reviewed.   Constitutional:       Appearance: She is well-developed.   HENT:      Head: Normocephalic.      Right Ear: External ear normal.      Left Ear: External ear normal.      Nose: Nose normal.   Eyes:      General: Lids are normal.      Conjunctiva/sclera: Conjunctivae normal.      Pupils: Pupils are equal, round, and reactive to light.   Neck:      Musculoskeletal: Normal range of motion and neck supple.      Thyroid: No thyroid mass or thyromegaly.      Vascular: No carotid bruit.      Trachea: Trachea normal.   Cardiovascular:      Rate and Rhythm: Normal rate and regular rhythm.      Heart sounds: No murmur.   Pulmonary:      Effort: Pulmonary effort is normal. No respiratory distress.      Breath sounds: Normal breath sounds. No decreased breath sounds, wheezing, rhonchi or rales.   Chest:      Chest wall: No tenderness.   Abdominal:      General: Bowel sounds are normal.      Palpations: Abdomen is soft.      Tenderness: There is no abdominal tenderness.   Musculoskeletal: Normal range of motion.        Feet:    Skin:     General: Skin is warm and dry.   Neurological:      Mental Status: She is alert and oriented to person, place, and time.   Psychiatric:         Behavior: Behavior normal.         Assessment/Plan    Diagnoses and all orders for this visit:    1. Essential hypertension (Primary)  -     ramipril (ALTACE) 10 MG capsule; Take 1 capsule by mouth Daily.  Dispense: 90 capsule; Refill: 1  -     Comprehensive Metabolic Panel; Future  -     Lipid Panel; Future  -     CBC & Differential; Future  -     carvedilol (Coreg) 3.125 MG tablet; Take 1 tablet by mouth 2 (Two) Times a Day With Meals.  Dispense: 60 tablet; Refill: 3    2. Hyperlipidemia, unspecified hyperlipidemia type  -     Comprehensive Metabolic Panel; Future  -     Lipid Panel; Future    3. 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  Dispense: 90 tablet; Refill: 1    4. Abnormal glucose  -     Hemoglobin A1c; Future    5. Vitamin D deficiency  -     Vitamin D 25 Hydroxy; Future    6. Asymptomatic microscopic hematuria  -     POC Urinalysis Dipstick, Automated    7. Right foot pain  -     XR Foot 3+ View Right; Future    Other orders  -     Cancel: atorvastatin (LIPITOR) 80 MG tablet; Take 0.5 tablets by mouth Every Night.  Dispense: 45 tablet; Refill: 5      Add coreg for BP.  Pt has enough atorvastatin at this time.              Current Outpatient Medications:   •  atorvastatin (LIPITOR) 80 MG tablet, Take 0.5 tablets by mouth Every Night., Disp: 45 tablet, Rfl: 5  •  clopidogrel (PLAVIX) 75 MG tablet, Take 1 tablet by mouth Daily., Disp: 90 tablet, Rfl: 1  •  fluticasone (FLONASE) 50 MCG/ACT nasal spray, 2 sprays into each nostril Daily., Disp: , Rfl:   •  Multiple Vitamins-Minerals (MULTI VITAMIN/MINERALS PO), Take  by mouth Daily. Takes every other day, Disp: , Rfl:   •  NON FORMULARY, Calcium, magnesium, citate with vit d3 (liquid), Disp: , Rfl:   •  ramipril (ALTACE) 10 MG capsule, Take 1 capsule by mouth Daily., Disp: 90 capsule, Rfl: 1  •  sodium chloride (SALINE MIST) 0.65 % nasal spray, 1 spray into the nostril(s) as directed by provider As Needed for Congestion.,  Disp: , Rfl:   •  carvedilol (Coreg) 3.125 MG tablet, Take 1 tablet by mouth 2 (Two) Times a Day With Meals., Disp: 60 tablet, Rfl: 3    Return in about 3 months (around 6/3/2021), or if symptoms worsen or fail to improve, for Recheck bp with nurse in 2 weeks.     Recent Results (from the past 168 hour(s))   POC Urinalysis Dipstick, Automated    Collection Time: 03/03/21  3:30 PM    Specimen: Urine   Result Value Ref Range    Color Yellow Yellow, Straw, Dark Yellow, Mary    Clarity, UA Clear Clear    Specific Gravity  1.015 1.005 - 1.030    pH, Urine 5.5 5.0 - 8.0    Leukocytes Negative Negative    Nitrite, UA Negative Negative    Protein, POC Negative Negative mg/dL    Glucose, UA Negative Negative, 1000 mg/dL (3+) mg/dL    Ketones, UA Negative Negative    Urobilinogen, UA Normal Normal    Bilirubin Negative Negative    Blood, UA 2+ (A) Negative     36 minutes spent face-to-face with patient answering questions, examining patient, documenting  and checking results.

## 2021-03-04 DIAGNOSIS — R79.89 LOW SERUM SODIUM: Primary | ICD-10-CM

## 2021-03-04 LAB
ALBUMIN SERPL-MCNC: 4.5 G/DL (ref 3.5–5.2)
ALBUMIN/GLOB SERPL: 2.1 G/DL
ALP SERPL-CCNC: 81 U/L (ref 39–117)
ALT SERPL W P-5'-P-CCNC: 16 U/L (ref 1–33)
ANION GAP SERPL CALCULATED.3IONS-SCNC: 9.1 MMOL/L (ref 5–15)
AST SERPL-CCNC: 22 U/L (ref 1–32)
BILIRUB SERPL-MCNC: 0.7 MG/DL (ref 0–1.2)
BUN SERPL-MCNC: 20 MG/DL (ref 8–23)
BUN/CREAT SERPL: 18.5 (ref 7–25)
CALCIUM SPEC-SCNC: 8.8 MG/DL (ref 8.6–10.5)
CHLORIDE SERPL-SCNC: 95 MMOL/L (ref 98–107)
CO2 SERPL-SCNC: 24.9 MMOL/L (ref 22–29)
CREAT SERPL-MCNC: 1.08 MG/DL (ref 0.57–1)
GFR SERPL CREATININE-BSD FRML MDRD: 49 ML/MIN/1.73
GLOBULIN UR ELPH-MCNC: 2.1 GM/DL
GLUCOSE SERPL-MCNC: 83 MG/DL (ref 65–99)
POTASSIUM SERPL-SCNC: 4.3 MMOL/L (ref 3.5–5.2)
PROT SERPL-MCNC: 6.6 G/DL (ref 6–8.5)
SODIUM SERPL-SCNC: 129 MMOL/L (ref 136–145)

## 2021-03-05 ENCOUNTER — TELEPHONE (OUTPATIENT)
Dept: INTERNAL MEDICINE | Facility: CLINIC | Age: 81
End: 2021-03-05

## 2021-03-05 NOTE — TELEPHONE ENCOUNTER
----- Message from Alayna PEREZ MD sent at 3/4/2021  8:21 AM EST -----  Sodium is a little bit low.  Please repeat level next week.  Hemoglobin A1c is in the prediabetic range.  Please follow low animal fat, low sugar, low alcohol diet.  GFR is decreased but stable please avoid Advil and Aleve and other nonsteroidals.

## 2021-03-09 ENCOUNTER — HOSPITAL ENCOUNTER (OUTPATIENT)
Dept: GENERAL RADIOLOGY | Facility: HOSPITAL | Age: 81
Discharge: HOME | End: 2021-03-09
Admitting: FAMILY MEDICINE

## 2021-03-09 DIAGNOSIS — M79.671 RIGHT FOOT PAIN: ICD-10-CM

## 2021-03-09 PROCEDURE — 73630 X-RAY EXAM OF FOOT: CPT

## 2021-03-11 ENCOUNTER — LAB (OUTPATIENT)
Dept: LAB | Facility: HOSPITAL | Age: 81
End: 2021-03-11

## 2021-03-11 ENCOUNTER — TELEPHONE (OUTPATIENT)
Dept: INTERNAL MEDICINE | Facility: CLINIC | Age: 81
End: 2021-03-11

## 2021-03-11 ENCOUNTER — CLINICAL SUPPORT (OUTPATIENT)
Dept: INTERNAL MEDICINE | Facility: CLINIC | Age: 81
End: 2021-03-11

## 2021-03-11 VITALS — SYSTOLIC BLOOD PRESSURE: 128 MMHG | DIASTOLIC BLOOD PRESSURE: 82 MMHG

## 2021-03-11 DIAGNOSIS — R79.89 LOW SERUM SODIUM: ICD-10-CM

## 2021-03-11 PROCEDURE — 80048 BASIC METABOLIC PNL TOTAL CA: CPT | Performed by: FAMILY MEDICINE

## 2021-03-12 LAB
ANION GAP SERPL CALCULATED.3IONS-SCNC: 5 MMOL/L (ref 5–15)
BUN SERPL-MCNC: 21 MG/DL (ref 8–23)
BUN/CREAT SERPL: 19.4 (ref 7–25)
CALCIUM SPEC-SCNC: 9.4 MG/DL (ref 8.6–10.5)
CHLORIDE SERPL-SCNC: 99 MMOL/L (ref 98–107)
CO2 SERPL-SCNC: 29 MMOL/L (ref 22–29)
CREAT SERPL-MCNC: 1.08 MG/DL (ref 0.57–1)
GFR SERPL CREATININE-BSD FRML MDRD: 49 ML/MIN/1.73
GLUCOSE SERPL-MCNC: 88 MG/DL (ref 65–99)
POTASSIUM SERPL-SCNC: 4.9 MMOL/L (ref 3.5–5.2)
SODIUM SERPL-SCNC: 133 MMOL/L (ref 136–145)

## 2021-05-11 ENCOUNTER — OFFICE VISIT (OUTPATIENT)
Dept: INTERNAL MEDICINE | Facility: CLINIC | Age: 81
End: 2021-05-11

## 2021-05-11 VITALS
HEART RATE: 80 BPM | SYSTOLIC BLOOD PRESSURE: 160 MMHG | DIASTOLIC BLOOD PRESSURE: 70 MMHG | WEIGHT: 145 LBS | OXYGEN SATURATION: 99 % | TEMPERATURE: 97.7 F | BODY MASS INDEX: 27.38 KG/M2 | HEIGHT: 61 IN

## 2021-05-11 DIAGNOSIS — Z00.00 MEDICARE ANNUAL WELLNESS VISIT, SUBSEQUENT: Primary | ICD-10-CM

## 2021-05-11 DIAGNOSIS — Z78.0 MENOPAUSE: ICD-10-CM

## 2021-05-11 DIAGNOSIS — Z12.31 ENCOUNTER FOR SCREENING MAMMOGRAM FOR MALIGNANT NEOPLASM OF BREAST: ICD-10-CM

## 2021-05-11 PROCEDURE — G0439 PPPS, SUBSEQ VISIT: HCPCS | Performed by: FAMILY MEDICINE

## 2021-05-11 NOTE — PATIENT INSTRUCTIONS
Mediterranean Diet  A Mediterranean diet refers to food and lifestyle choices that are based on the traditions of countries located on the [place]. This way of eating has been shown to help prevent certain conditions and improve outcomes for people who have chronic diseases, like kidney disease and heart disease.  What are tips for following this plan?  Lifestyle  · Cook and eat meals together with your family, when possible.  · Drink enough fluid to keep your urine clear or pale yellow.  · Be physically active every day. This includes:  ? Aerobic exercise like running or swimming.  ? Leisure activities like gardening, walking, or housework.  · Get 7-8 hours of sleep each night.  · If recommended by your health care provider, drink red wine in moderation. This means 1 glass a day for nonpregnant women and 2 glasses a day for men. A glass of wine equals 5 oz (150 mL).  Reading food labels    · Check the serving size of packaged foods. For foods such as rice and pasta, the serving size refers to the amount of cooked product, not dry.  · Check the total fat in packaged foods. Avoid foods that have saturated fat or trans fats.  · Check the ingredients list for added sugars, such as corn syrup.  Shopping  · At the grocery store, buy most of your food from the areas near the walls of the store. This includes:  ? Fresh fruits and vegetables (produce).  ? Grains, beans, nuts, and seeds. Some of these may be available in unpackaged forms or large amounts (in bulk).  ? Fresh seafood.  ? Poultry and eggs.  ? Low-fat dairy products.  · Buy whole ingredients instead of prepackaged foods.  · Buy fresh fruits and vegetables in-season from local farmers markets.  · Buy frozen fruits and vegetables in resealable bags.  · If you do not have access to quality fresh seafood, buy precooked frozen shrimp or canned fish, such as tuna, salmon, or sardines.  · Buy small amounts of raw or cooked vegetables, salads, or olives from  the deli or salad bar at your store.  · Stock your pantry so you always have certain foods on hand, such as olive oil, canned tuna, canned tomatoes, rice, pasta, and beans.  Cooking  · Cook foods with extra-virgin olive oil instead of using butter or other vegetable oils.  · Have meat as a side dish, and have vegetables or grains as your main dish. This means having meat in small portions or adding small amounts of meat to foods like pasta or stew.  · Use beans or vegetables instead of meat in common dishes like chili or lasagna.  · [place] with different cooking methods. Try roasting or broiling vegetables instead of steaming or sautéeing them.  · Add frozen vegetables to soups, stews, pasta, or rice.  · Add nuts or seeds for added healthy fat at each meal. You can add these to yogurt, salads, or vegetable dishes.  · Marinate fish or vegetables using olive oil, lemon juice, garlic, and fresh herbs.  Meal planning    · Plan to eat 1 vegetarian meal one day each week. Try to work up to 2 vegetarian meals, if possible.  · Eat seafood 2 or more times a week.  · Have healthy snacks readily available, such as:  ? Vegetable sticks with hummus.  ? Greek yogurt.  ? Fruit and nut trail mix.  · Eat balanced meals throughout the week. This includes:  ? Fruit: 2-3 servings a day  ? Vegetables: 4-5 servings a day  ? Low-fat dairy: 2 servings a day  ? Fish, poultry, or lean meat: 1 serving a day  ? Beans and legumes: 2 or more servings a week  ? Nuts and seeds: 1-2 servings a day  ? Whole grains: 6-8 servings a day  ? Extra-virgin olive oil: 3-4 servings a day  · Limit red meat and sweets to only a few servings a month  What are my food choices?  · Mediterranean diet  ? Recommended  § Grains: Whole-grain pasta. Brown rice. Bulgar wheat. Polenta. Couscous. Whole-wheat bread. Oatmeal. Quinoa.  § Vegetables: Artichokes. Beets. Broccoli. Cabbage. Carrots. Eggplant. Green beans. Chard. Kale. Spinach. Onions. Leeks. Peas. Squash.  Tomatoes. Peppers. Radishes.  § Fruits: Apples. Apricots. Avocado. Berries. Bananas. Cherries. Dates. Figs. Grapes. [place]. Melon. Oranges. Peaches. Plums. Pomegranate.  § Meats and other protein foods: Beans. Almonds. Sunflower seeds. Pine nuts. Peanuts. Cod. [place]. Scallops. Shrimp. Tuna. Tilapia. Clams. Oysters. Eggs.  § Dairy: Low-fat milk. Cheese. Greek yogurt.  § Beverages: Water. Red wine. Herbal tea.  § Fats and oils: Extra virgin olive oil. Avocado oil. Grape seed oil.  § Sweets and desserts: Greek yogurt with honey. Baked apples. Poached pears. Trail mix.  § Seasoning and other foods: Basil. Cilantro. Coriander. Cumin. Mint. Parsley. Jovany. Rosemary. Tarragon. Garlic. Oregano. Thyme. Pepper. Balsalmic vinegar. Tahini. Hummus. Tomato sauce. Olives. Mushrooms.  ? Limit these  § Grains: Prepackaged pasta or rice dishes. Prepackaged cereal with added sugar.  § Vegetables: Deep fried potatoes (french fries).  § Fruits: Fruit canned in syrup.  § Meats and other protein foods: Beef. Pork. Lamb. Poultry with skin. Hot dogs. Martinez.  § Dairy: Ice cream. Sour cream. Whole milk.  § Beverages: Juice. Sugar-sweetened soft drinks. Beer. Liquor and spirits.  § Fats and oils: Butter. Canola oil. Vegetable oil. Beef fat (tallow). Lard.  § Sweets and desserts: Cookies. Cakes. Pies. Candy.  § Seasoning and other foods: Mayonnaise. Premade sauces and marinades.  The items listed may not be a complete list. Talk with your dietitian about what dietary choices are right for you.  Summary  · The Mediterranean diet includes both food and lifestyle choices.  · Eat a variety of fresh fruits and vegetables, beans, nuts, seeds, and whole grains.  · Limit the amount of red meat and sweets that you eat.  · Talk with your health care provider about whether it is safe for you to drink red wine in moderation. This means 1 glass a day for nonpregnant women and 2 glasses a day for men. A glass of wine equals 5 oz (150 mL).  This information  is not intended to replace advice given to you by your health care provider. Make sure you discuss any questions you have with your health care provider.  Document Revised: 08/17/2017 Document Reviewed: 08/10/2017  Elsevier Patient Education © 2020 [employer]  Fall Prevention in the Home, Adult  Falls can cause injuries and can affect people from all age groups. There are many simple things that you can do to make your home safe and to help prevent falls. Ask for help when making these changes, if needed.  What actions can I take to prevent falls?  General instructions  · Use good lighting in all rooms. Replace any light bulbs that burn out.  · Turn on lights if it is dark. Use night-lights.  · Place frequently used items in easy-to-reach places. Lower the shelves around your home if necessary.  · Set up furniture so that there are clear paths around it. Avoid moving your furniture around.  · Remove throw rugs and other tripping hazards from the floor.  · Avoid walking on wet floors.  · Fix any uneven floor surfaces.  · Add color or contrast paint or tape to grab bars and handrails in your home. Place contrasting color strips on the first and last steps of stairways.  · When you use a stepladder, make sure that it is completely opened and that the sides are firmly locked. Have someone hold the ladder while you are using it. Do not climb a closed stepladder.  · Be aware of any and all pets.  What can I do in the bathroom?         · Keep the floor dry. Immediately clean up any water that spills onto the floor.  · Remove soap buildup in the tub or shower on a regular basis.  · Use non-skid mats or decals on the floor of the tub or shower.  · Attach bath mats securely with double-sided, non-slip rug tape.  · If you need to sit down while you are in the shower, use a plastic, non-slip stool.  · Install grab bars by the toilet and in the tub and shower. Do not use towel bars as grab bars.  What can I do in the  bedroom?  · Make sure that a bedside light is easy to reach.  · Do not use oversized bedding that drapes onto the floor.  · Have a firm chair that has side arms to use for getting dressed.  What can I do in the kitchen?  · Clean up any spills right away.  · If you need to reach for something above you, use a sturdy step stool that has a grab bar.  · Keep electrical cables out of the way.  · Do not use floor polish or wax that makes floors slippery. If you must use wax, make sure that it is non-skid floor wax.  What can I do in the stairways?  · Do not leave any items on the stairs.  · Make sure that you have a light switch at the top of the stairs and the bottom of the stairs. Have them installed if you do not have them.  · Make sure that there are handrails on both sides of the stairs. Fix handrails that are broken or loose. Make sure that handrails are as long as the stairways.  · Install non-slip stair treads on all stairs in your home.  · Avoid having throw rugs at the top or bottom of stairways, or secure the rugs with carpet tape to prevent them from moving.  · Choose a carpet design that does not hide the edge of steps on the stairway.  · Check any carpeting to make sure that it is firmly attached to the stairs. Fix any carpet that is loose or worn.  What can I do on the outside of my home?  · Use bright outdoor lighting.  · Regularly repair the edges of walkways and driveways and fix any cracks.  · Remove high doorway thresholds.  · Trim any shrubbery on the main path into your home.  · Regularly check that handrails are securely fastened and in good repair. Both sides of any steps should have handrails.  · Install guardrails along the edges of any raised decks or porches.  · Clear walkways of debris and clutter, including tools and rocks.  · Have leaves, snow, and ice cleared regularly.  · Use sand or salt on walkways during winter months.  · In the garage, clean up any spills right away, including grease  or oil spills.  What other actions can I take?  · Wear closed-toe shoes that fit well and support your feet. Wear shoes that have rubber soles or low heels.  · Use mobility aids as needed, such as canes, walkers, scooters, and crutches.  · Review your medicines with your health care provider. Some medicines can cause dizziness or changes in blood pressure, which increase your risk of falling.  Talk with your health care provider about other ways that you can decrease your risk of falls. This may include working with a physical therapist or  to improve your strength, balance, and endurance.  Where to find more information  · Centers for Disease Control and Prevention, [place]: https://www.cdc.gov  · National [place] on Aging: https://mg7bpyl.alissa.nih.gov  Contact a health care provider if:  · You are afraid of falling at home.  · You feel weak, drowsy, or dizzy at home.  · You fall at home.  Summary  · There are many simple things that you can do to make your home safe and to help prevent falls.  · Ways to make your home safe include removing tripping hazards and installing grab bars in the bathroom.  · Ask for help when making these changes in your home.  This information is not intended to replace advice given to you by your health care provider. Make sure you discuss any questions you have with your health care provider.  Document Revised: 11/30/2018 Document Reviewed: 08/02/2018  [employer] Patient Education © 2021 [employer]  Exercising to Stay Healthy  To become healthy and stay healthy, it is recommended that you do moderate-intensity and vigorous-intensity exercise. You can tell that you are exercising at a moderate intensity if your heart starts beating faster and you start breathing faster but can still hold a conversation. You can tell that you are exercising at a vigorous intensity if you are breathing much harder and faster and cannot hold a conversation while exercising.  Exercising regularly is  important. It has many health benefits, such as:  · Improving overall fitness, flexibility, and endurance.  · Increasing bone density.  · Helping with weight control.  · Decreasing body fat.  · Increasing muscle strength.  · Reducing stress and tension.  · Improving overall health.  How often should I exercise?  Choose an activity that you enjoy, and set realistic goals. Your health care provider can help you make an activity plan that works for you.  Exercise regularly as told by your health care provider. This may include:  · Doing strength training two times a week, such as:  ? Lifting weights.  ? Using resistance bands.  ? Push-ups.  ? Sit-ups.  ? Yoga.  · Doing a certain intensity of exercise for a given amount of time. Choose from these options:  ? A total of 150 minutes of moderate-intensity exercise every week.  ? A total of 75 minutes of vigorous-intensity exercise every week.  ? A mix of moderate-intensity and vigorous-intensity exercise every week.  Children, pregnant women, people who have not exercised regularly, people who are overweight, and older adults may need to talk with a health care provider about what activities are safe to do. If you have a medical condition, be sure to talk with your health care provider before you start a new exercise program.  What are some exercise ideas?  Moderate-intensity exercise ideas include:  · Walking 1 mile (1.6 km) in about 15 minutes.  · Biking.  · Hiking.  · Golfing.  · Dancing.  · Water aerobics.  Vigorous-intensity exercise ideas include:  · Walking 4.5 miles (7.2 km) or more in about 1 hour.  · Jogging or running 5 miles (8 km) in about 1 hour.  · Biking 10 miles (16.1 km) or more in about 1 hour.  · Lap swimming.  · Roller-skating or in-line skating.  · Cross-country skiing.  · Vigorous competitive sports, such as football, basketball, and soccer.  · Jumping rope.  · Aerobic dancing.  What are some everyday activities that can help me to get  exercise?  · Yard work, such as:  ? Pushing a .  ? Raking and bagging leaves.  · Washing your car.  · Pushing a stroller.  · Shoveling snow.  · Gardening.  · Washing windows or floors.  How can I be more active in my day-to-day activities?  · Use stairs instead of an elevator.  · Take a walk during your lunch break.  · If you drive, park your car farther away from your work or school.  · If you take public transportation, get off one stop early and walk the rest of the way.  · Stand up or walk around during all of your indoor phone calls.  · Get up, stretch, and walk around every 30 minutes throughout the day.  · Enjoy exercise with a friend. Support to continue exercising will help you keep a regular routine of activity.  What guidelines can I follow while exercising?  · Before you start a new exercise program, talk with your health care provider.  · Do not exercise so much that you hurt yourself, feel dizzy, or get very short of breath.  · Wear comfortable clothes and wear shoes with good support.  · Drink plenty of water while you exercise to prevent dehydration or heat stroke.  · Work out until your breathing and your heartbeat get faster.  Where to find more information  · [place] Department of Health and Human Services: www.hhs.gov  · Centers for Disease Control and Prevention (CDC): www.cdc.gov  Summary  · Exercising regularly is important. It will improve your overall fitness, flexibility, and endurance.  · Regular exercise also will improve your overall health. It can help you control your weight, reduce stress, and improve your bone density.  · Do not exercise so much that you hurt yourself, feel dizzy, or get very short of breath.  · Before you start a new exercise program, talk with your health care provider.  This information is not intended to replace advice given to you by your health care provider. Make sure you discuss any questions you have with your health care provider.  Document Revised:  "11/30/2018 Document Reviewed: 11/08/2018  [employer] Patient Education © 2021 [employer]  https://www.nhlbi.nih.gov/files/docs/public/heart/dash_brief.pdf\">   DASH Eating Plan  DASH stands for Dietary Approaches to Stop Hypertension. The DASH eating plan is a healthy eating plan that has been shown to:  · Reduce high blood pressure (hypertension).  · Reduce your risk for type 2 diabetes, heart disease, and stroke.  · Help with weight loss.  What are tips for following this plan?  Reading food labels  · Check food labels for the amount of salt (sodium) per serving. Choose foods with less than 5 percent of the Daily Value of sodium. Generally, foods with less than 300 milligrams (mg) of sodium per serving fit into this eating plan.  · To find whole grains, look for the word \"whole\" as the first word in the ingredient list.  Shopping  · Buy products labeled as \"low-sodium\" or \"no salt added.\"  · Buy fresh foods. Avoid canned foods and pre-made or frozen meals.  Cooking  · Avoid adding salt when cooking. Use salt-free seasonings or herbs instead of table salt or sea salt. Check with your health care provider or pharmacist before using salt substitutes.  · Do not grove foods. Cook foods using healthy methods such as baking, boiling, grilling, roasting, and broiling instead.  · Cook with heart-healthy oils, such as olive, canola, avocado, soybean, or sunflower oil.  Meal planning    · Eat a balanced diet that includes:  ? 4 or more servings of fruits and 4 or more servings of vegetables each day. Try to fill one-half of your plate with fruits and vegetables.  ? 6-8 servings of whole grains each day.  ? Less than 6 oz (170 g) of lean meat, poultry, or fish each day. A 3-oz (85-g) serving of meat is about the same size as a deck of cards. One egg equals 1 oz (28 g).  ? 2-3 servings of low-fat dairy each day. One serving is 1 cup (237 mL).  ? 1 serving of nuts, seeds, or beans 5 times each week.  ? 2-3 servings of " heart-healthy fats. Healthy fats called omega-3 fatty acids are found in foods such as walnuts, flaxseeds, fortified milks, and eggs. These fats are also found in cold-water fish, such as sardines, salmon, and mackerel.  · Limit how much you eat of:  ? Canned or prepackaged foods.  ? Food that is high in trans fat, such as some fried foods.  ? Food that is high in saturated fat, such as fatty meat.  ? Desserts and other sweets, sugary drinks, and other foods with added sugar.  ? Full-fat dairy products.  · Do not salt foods before eating.  · Do not eat more than 4 egg yolks a week.  · Try to eat at least 2 vegetarian meals a week.  · Eat more home-cooked food and less restaurant, buffet, and fast food.  Lifestyle  · When eating at a restaurant, ask that your food be prepared with less salt or no salt, if possible.  · If you drink alcohol:  ? Limit how much you use to:  § 0-1 drink a day for women who are not pregnant.  § 0-2 drinks a day for men.  ? Be aware of how much alcohol is in your drink. In the [place] one drink equals one 12 oz bottle of beer (355 mL), one 5 oz glass of wine (148 mL), or one 1½ oz glass of hard liquor (44 mL).  General information  · Avoid eating more than 2,300 mg of salt a day. If you have hypertension, you may need to reduce your sodium intake to 1,500 mg a day.  · Work with your health care provider to maintain a healthy body weight or to lose weight. Ask what an ideal weight is for you.  · Get at least 30 minutes of exercise that causes your heart to beat faster (aerobic exercise) most days of the week. Activities may include walking, swimming, or biking.  · Work with your health care provider or dietitian to adjust your eating plan to your individual calorie needs.  What foods should I eat?  Fruits  All fresh, dried, or frozen fruit. Canned fruit in natural juice (without added sugar).  Vegetables  Fresh or frozen vegetables (raw, steamed, roasted, or grilled). Low-sodium or  reduced-sodium tomato and vegetable juice. Low-sodium or reduced-sodium tomato sauce and tomato paste. Low-sodium or reduced-sodium canned vegetables.  Grains  Whole-grain or whole-wheat bread. Whole-grain or whole-wheat pasta. Brown rice. Oatmeal. Quinoa. Bulgur. Whole-grain and low-sodium cereals. Marry bread. Low-fat, low-sodium crackers. Whole-wheat flour tortillas.  Meats and other proteins  Skinless chicken or turkey. Ground chicken or turkey. Pork with fat trimmed off. Fish and seafood. Egg whites. Dried beans, peas, or lentils. Unsalted nuts, nut butters, and seeds. Unsalted canned beans. Lean cuts of beef with fat trimmed off. Low-sodium, lean precooked or cured meat, such as sausages or meat loaves.  Dairy  Low-fat (1%) or fat-free (skim) milk. Reduced-fat, low-fat, or fat-free cheeses. Nonfat, low-sodium ricotta or cottage cheese. Low-fat or nonfat yogurt. Low-fat, low-sodium cheese.  Fats and oils  Soft margarine without trans fats. Vegetable oil. Reduced-fat, low-fat, or light mayonnaise and salad dressings (reduced-sodium). Canola, safflower, olive, avocado, soybean, and sunflower oils. Avocado.  Seasonings and condiments  Herbs. Spices. Seasoning mixes without salt.  Other foods  Unsalted popcorn and pretzels. Fat-free sweets.  The items listed above may not be a complete list of foods and beverages you can eat. Contact a dietitian for more information.  What foods should I avoid?  Fruits  Canned fruit in a light or heavy syrup. Fried fruit. Fruit in cream or butter sauce.  Vegetables  Creamed or fried vegetables. Vegetables in a cheese sauce. Regular canned vegetables (not low-sodium or reduced-sodium). Regular canned tomato sauce and paste (not low-sodium or reduced-sodium). Regular tomato and vegetable juice (not low-sodium or reduced-sodium). Pickles. Olives.  Grains  Baked goods made with fat, such as croissants, muffins, or some breads. Dry pasta or rice meal packs.  Meats and other  proteins  Fatty cuts of meat. Ribs. Fried meat. Martinez. Bologna, salami, and other precooked or cured meats, such as sausages or meat loaves. Fat from the back of a pig (fatback). Bratwurst. Salted nuts and seeds. Canned beans with added salt. Canned or smoked fish. Whole eggs or egg yolks. Chicken or turkey with skin.  Dairy  Whole or 2% milk, cream, and half-and-half. Whole or full-fat cream cheese. Whole-fat or sweetened yogurt. Full-fat cheese. Nondairy creamers. Whipped toppings. Processed cheese and cheese spreads.  Fats and oils  Butter. Stick margarine. Lard. Shortening. Ghee. Martinez fat. Tropical oils, such as coconut, palm kernel, or palm oil.  Seasonings and condiments  Onion salt, garlic salt, seasoned salt, table salt, and sea salt. Worcestershire sauce. Tartar sauce. Barbecue sauce. Teriyaki sauce. Soy sauce, including reduced-sodium. Steak sauce. Canned and packaged gravies. Fish sauce. Oyster sauce. Cocktail sauce. Store-bought horseradish. Ketchup. Mustard. Meat flavorings and tenderizers. Bouillon cubes. Hot sauces. Pre-made or packaged marinades. Pre-made or packaged taco seasonings. Relishes. Regular salad dressings.  Other foods  Salted popcorn and pretzels.  The items listed above may not be a complete list of foods and beverages you should avoid. Contact a dietitian for more information.  Where to find more information  · National Heart, Lung, and Blood [place]: www.nhlbi.nih.gov  · American Heart Association: www.heart.org  · Academy of Nutrition and Dietetics: www.eatright.org  · National Kidney Foundation: www.kidney.org  Summary  · The DASH eating plan is a healthy eating plan that has been shown to reduce high blood pressure (hypertension). It may also reduce your risk for type 2 diabetes, heart disease, and stroke.  · When on the DASH eating plan, aim to eat more fresh fruits and vegetables, whole grains, lean proteins, low-fat dairy, and heart-healthy fats.  · With the DASH eating plan,  you should limit salt (sodium) intake to 2,300 mg a day. If you have hypertension, you may need to reduce your sodium intake to 1,500 mg a day.  · Work with your health care provider or dietitian to adjust your eating plan to your individual calorie needs.  This information is not intended to replace advice given to you by your health care provider. Make sure you discuss any questions you have with your health care provider.  Document Revised: 11/20/2020 Document Reviewed: 11/20/2020  [employer] Patient Education © 2021 [employer]  Calorie Counting for Weight Loss  Calories are units of energy. Your body needs a certain amount of calories from food to keep you going throughout the day. When you eat more calories than your body needs, your body stores the extra calories as fat. When you eat fewer calories than your body needs, your body burns fat to get the energy it needs.  Calorie counting means keeping track of how many calories you eat and drink each day. Calorie counting can be helpful if you need to lose weight. If you make sure to eat fewer calories than your body needs, you should lose weight. Ask your health care provider what a healthy weight is for you.  For calorie counting to work, you will need to eat the right number of calories in a day in order to lose a healthy amount of weight per week. A dietitian can help you determine how many calories you need in a day and will give you suggestions on how to reach your calorie goal.  · A healthy amount of weight to lose per week is usually 1-2 lb (0.5-0.9 kg). This usually means that your daily calorie intake should be reduced by 500-750 calories.  · Eating 1,200 - 1,500 calories per day can help most women lose weight.  · Eating 1,500 - 1,800 calories per day can help most men lose weight.  What is my plan?  My goal is to have __________ calories per day.  If I have this many calories per day, I should lose around __________ pounds per week.  What do I need  to know about calorie counting?  In order to meet your daily calorie goal, you will need to:  · Find out how many calories are in each food you would like to eat. Try to do this before you eat.  · Decide how much of the food you plan to eat.  · Write down what you ate and how many calories it had. Doing this is called keeping a food log.  To successfully lose weight, it is important to balance calorie counting with a healthy lifestyle that includes regular activity. Aim for 150 minutes of moderate exercise (such as walking) or 75 minutes of vigorous exercise (such as running) each week.  Where do I find calorie information?    The number of calories in a food can be found on a Nutrition Facts label. If a food does not have a Nutrition Facts label, try to look up the calories online or ask your dietitian for help.  Remember that calories are listed per serving. If you choose to have more than one serving of a food, you will have to multiply the calories per serving by the amount of servings you plan to eat. For example, the label on a package of bread might say that a serving size is 1 slice and that there are 90 calories in a serving. If you eat 1 slice, you will have eaten 90 calories. If you eat 2 slices, you will have eaten 180 calories.  How do I keep a food log?  Immediately after each meal, record the following information in your food log:  · What you ate. Don't forget to include toppings, sauces, and other extras on the food.  · How much you ate. This can be measured in cups, ounces, or number of items.  · How many calories each food and drink had.  · The total number of calories in the meal.  Keep your food log near you, such as in a small notebook in your pocket, or use a mobile reva or website. Some programs will calculate calories for you and show you how many calories you have left for the day to meet your goal.  What are some calorie counting tips?    · Use your calories on foods and drinks that will  "fill you up and not leave you hungry:  ? Some examples of foods that fill you up are nuts and nut butters, vegetables, lean proteins, and high-fiber foods like whole grains. High-fiber foods are foods with more than 5 g fiber per serving.  ? Drinks such as sodas, specialty coffee drinks, alcohol, and juices have a lot of calories, yet do not fill you up.  · Eat nutritious foods and avoid empty calories. Empty calories are calories you get from foods or beverages that do not have many vitamins or protein, such as candy, sweets, and soda. It is better to have a nutritious high-calorie food (such as an avocado) than a food with few nutrients (such as a bag of chips).  · Know how many calories are in the foods you eat most often. This will help you calculate calorie counts faster.  · Pay attention to calories in drinks. Low-calorie drinks include water and unsweetened drinks.  · Pay attention to nutrition labels for \"low fat\" or \"fat free\" foods. These foods sometimes have the same amount of calories or more calories than the full fat versions. They also often have added sugar, starch, or salt, to make up for flavor that was removed with the fat.  · Find a way of tracking calories that works for you. Get creative. Try different apps or programs if writing down calories does not work for you.  What are some portion control tips?  · Know how many calories are in a serving. This will help you know how many servings of a certain food you can have.  · Use a measuring cup to measure serving sizes. You could also try weighing out portions on a kitchen scale. With time, you will be able to estimate serving sizes for some foods.  · Take some time to put servings of different foods on your favorite plates, bowls, and cups so you know what a serving looks like.  · Try not to eat straight from a bag or box. Doing this can lead to overeating. Put the amount you would like to eat in a cup or on a plate to make sure you are eating the " "right portion.  · Use smaller plates, glasses, and bowls to prevent overeating.  · Try not to multitask (for example, watch TV or use your computer) while eating. If it is time to eat, sit down at a table and enjoy your food. This will help you to know when you are full. It will also help you to be aware of what you are eating and how much you are eating.  What are tips for following this plan?  Reading food labels  · Check the calorie count compared to the serving size. The serving size may be smaller than what you are used to eating.  · Check the source of the calories. Make sure the food you are eating is high in vitamins and protein and low in saturated and trans fats.  Shopping  · Read nutrition labels while you shop. This will help you make healthy decisions before you decide to purchase your food.  · Make a grocery list and stick to it.  Cooking  · Try to cook your favorite foods in a healthier way. For example, try baking instead of frying.  · Use low-fat dairy products.  Meal planning  · Use more fruits and vegetables. Half of your plate should be fruits and vegetables.  · Include lean proteins like poultry and fish.  How do I count calories when eating out?  · Ask for smaller portion sizes.  · Consider sharing an entree and sides instead of getting your own entree.  · If you get your own entree, eat only half. Ask for a box at the beginning of your meal and put the rest of your entree in it so you are not tempted to eat it.  · If calories are listed on the menu, choose the lower calorie options.  · Choose dishes that include vegetables, fruits, whole grains, low-fat dairy products, and lean protein.  · Choose items that are boiled, broiled, grilled, or steamed. Stay away from items that are buttered, battered, fried, or served with cream sauce. Items labeled \"crispy\" are usually fried, unless stated otherwise.  · Choose water, low-fat milk, unsweetened iced tea, or other drinks without added sugar. If you " want an alcoholic beverage, choose a lower calorie option such as a glass of wine or light beer.  · Ask for dressings, sauces, and syrups on the side. These are usually high in calories, so you should limit the amount you eat.  · If you want a salad, choose a garden salad and ask for grilled meats. Avoid extra toppings like cagle, cheese, or fried items. Ask for the dressing on the side, or ask for olive oil and vinegar or lemon to use as dressing.  · Estimate how many servings of a food you are given. For example, a serving of cooked rice is ½ cup or about the size of half a baseball. Knowing serving sizes will help you be aware of how much food you are eating at restaurants. The list below tells you how big or small some common portion sizes are based on everyday objects:  ? 1 oz--4 stacked dice.  ? 3 oz--1 deck of cards.  ? 1 tsp--1 die.  ? 1 Tbsp--½ a ping-pong ball.  ? 2 Tbsp--1 ping-pong ball.  ? ½ cup--½ baseball.  ? 1 cup--1 baseball.  Summary  · Calorie counting means keeping track of how many calories you eat and drink each day. If you eat fewer calories than your body needs, you should lose weight.  · A healthy amount of weight to lose per week is usually 1-2 lb (0.5-0.9 kg). This usually means reducing your daily calorie intake by 500-750 calories.  · The number of calories in a food can be found on a Nutrition Facts label. If a food does not have a Nutrition Facts label, try to look up the calories online or ask your dietitian for help.  · Use your calories on foods and drinks that will fill you up, and not on foods and drinks that will leave you hungry.  · Use smaller plates, glasses, and bowls to prevent overeating.  This information is not intended to replace advice given to you by your health care provider. Make sure you discuss any questions you have with your health care provider.  Document Revised: 09/06/2019 Document Reviewed: 11/17/2017  [employer]vier Patient Education © 2020 [employer]  Advance  Directive    Advance directives are legal documents that let you make choices ahead of time about your health care and medical treatment in case you become unable to communicate for yourself. Advance directives are a way for you to make known your wishes to family, friends, and health care providers. This can let others know about your end-of-life care if you become unable to communicate.  Discussing and writing advance directives should happen over time rather than all at once. Advance directives can be changed depending on your situation and what you want, even after you have signed the advance directives.  There are different types of advance directives, such as:  · Medical power of .  · Living will.  · Do not resuscitate (DNR) or do not attempt resuscitation (DNAR) order.  Health care proxy and medical power of   A health care proxy is also called a health care agent. This is a person who is appointed to make medical decisions for you in cases where you are unable to make the decisions yourself. Generally, people choose someone they know well and trust to represent their preferences. Make sure to ask this person for an agreement to act as your proxy. A proxy may have to exercise judgment in the event of a medical decision for which your wishes are not known.  A medical power of  is a legal document that names your health care proxy. Depending on the laws in your state, after the document is written, it may also need to be:  · Signed.  · Notarized.  · Dated.  · Copied.  · Witnessed.  · Incorporated into your medical record.  You may also want to appoint someone to manage your money in a situation in which you are unable to do so. This is called a durable power of  for finances. It is a separate legal document from the durable power of  for health care. You may choose the same person or someone different from your health care proxy to act as your agent in money matters.  If  you do not appoint a proxy, or if there is a concern that the proxy is not acting in your best interests, a court may appoint a guardian to act on your behalf.  Living will  A living will is a set of instructions that state your wishes about medical care when you cannot express them yourself. Health care providers should keep a copy of your living will in your medical record. You may want to give a copy to family members or friends. To alert caregivers in case of an emergency, you can place a card in your wallet to let them know that you have a living will and where they can find it. A living will is used if you become:  · Terminally ill.  · Disabled.  · Unable to communicate or make decisions.  Items to consider in your living will include:  · To use or not to use life-support equipment, such as dialysis machines and breathing machines (ventilators).  · A DNR or DNAR order. This tells health care providers not to use cardiopulmonary resuscitation (CPR) if breathing or heartbeat stops.  · To use or not to use tube feeding.  · To be given or not to be given food and fluids.  · Comfort (palliative) care when the goal becomes comfort rather than a cure.  · Donation of organs and tissues.  A living will does not give instructions for distributing your money and property if you should pass away.  DNR or DNAR  A DNR or DNAR order is a request not to have CPR in the event that your heart stops beating or you stop breathing. If a DNR or DNAR order has not been made and shared, a health care provider will try to help any patient whose heart has stopped or who has stopped breathing. If you plan to have surgery, talk with your health care provider about how your DNR or DNAR order will be followed if problems occur.  What if I do not have an advance directive?  If you do not have an advance directive, some states assign family decision makers to act on your behalf based on how closely you are related to them. Each state has its  own laws about advance directives. You may want to check with your health care provider, , or state representative about the laws in your state.  Summary  · Advance directives are the legal documents that allow you to make choices ahead of time about your health care and medical treatment in case you become unable to tell others about your care.  · The process of discussing and writing advance directives should happen over time. You can change the advance directives, even after you have signed them.  · Advance directives include DNR or DNAR orders, living leiva, and designating an agent as your medical power of .  This information is not intended to replace advice given to you by your health care provider. Make sure you discuss any questions you have with your health care provider.  Document Revised: 2021 Document Reviewed: 2020  [employer] Patient Education ©  [employer]    Medicare Wellness  Personal Prevention Plan of Service     Date of Office Visit:  2021  Encounter Provider:  Alayna Toscano MD  Place of Service:  [facility]  Patient Name: Anabell Vera  :  1940    As part of the Medicare Wellness portion of your visit today, we are providing you with this personalized preventive plan of services (PPPS). This plan is based upon recommendations of the [place] Preventive Services Task Force (USPSTF) and the Advisory Committee on Immunization Practices (ACIP).    This lists the preventive care services that should be considered, and provides dates of when you are due. Items listed as completed are up-to-date and do not require any further intervention.    Health Maintenance   Topic Date Due   • DXA SCAN  Never done   • ZOSTER VACCINE (1 of 2) Never done   • Pneumococcal Vaccine 65+ (2 of 2 - PPSV23) 2017   • MAMMOGRAM  01/10/2021   • INFLUENZA VACCINE  2021   • LIPID PANEL  2022   • ANNUAL WELLNESS VISIT   05/11/2022   • COLORECTAL CANCER SCREENING  10/10/2024   • TDAP/TD VACCINES (2 - Td) 06/15/2028   • COVID-19 Vaccine  Completed       Orders Placed This Encounter   Procedures   • Mammo Screening Digital Tomosynthesis Bilateral With CAD     Standing Status:   Future     Standing Expiration Date:   5/11/2022     Order Specific Question:   Reason for Exam:     Answer:   screening   • DEXA Bone Density Axial     Standing Status:   Future     Standing Expiration Date:   5/11/2022     Order Specific Question:   Reason for Exam:     Answer:   menopause       Return in about 3 months (around 8/11/2021), or if symptoms worsen or fail to improve, for Recheck BP and check BP with nurse, Recheck.

## 2021-05-11 NOTE — PROGRESS NOTES
The ABCs of the Annual Wellness Visit  Subsequent Medicare Wellness Visit    Chief Complaint   Patient presents with   • Medicare Wellness-subsequent     pt is not fasting       Subjective   History of Present Illness:  Anabell Vera is a 81 y.o. female who presents for a Subsequent Medicare Wellness Visit.    HEALTH RISK ASSESSMENT    Recent Hospitalizations:  No hospitalization(s) within the last year.    Current Medical Providers:  Patient Care Team:  Alayna Toscano MD as PCP - General (Family Medicine)  Cameron Hall MD as Consulting Physician (Urology)  Yoly Collins MD as Consulting Physician (Cardiology)  Cameron Mattson MD as Consulting Physician (Colon and Rectal Surgery)  Jenny Turner MD as Consulting Physician (Dermatology)  Rodger Dodge MD as Consulting Physician (Nephrology)    Smoking Status:  Social History     Tobacco Use   Smoking Status Former Smoker   • Years: 2.00   • Quit date: 1964   • Years since quittin.3   Smokeless Tobacco Never Used   Tobacco Comment    2 years only       Alcohol Consumption:  Social History     Substance and Sexual Activity   Alcohol Use Yes    Comment: rarely drinks wine       Depression Screen:   PHQ-2/PHQ-9 Depression Screening 2021   Little interest or pleasure in doing things 0   Feeling down, depressed, or hopeless 0   Trouble falling or staying asleep, or sleeping too much -   Feeling tired or having little energy -   Poor appetite or overeating -   Feeling bad about yourself - or that you are a failure or have let yourself or your family down -   Trouble concentrating on things, such as reading the newspaper or watching television -   Moving or speaking so slowly that other people could have noticed. Or the opposite - being so fidgety or restless that you have been moving around a lot more than usual -   Thoughts that you would be better off dead, or of hurting yourself in some way -   Total Score 0   If you checked off any  problems, how difficult have these problems made it for you to do your work, take care of things at home, or get along with other people? -       Fall Risk Screen:  NANDOADI Fall Risk Assessment was completed, and patient is at MODERATE risk for falls. Assessment completed on:5/11/2021    Health Habits and Functional and Cognitive Screening:  Functional & Cognitive Status 5/11/2021   Do you have difficulty preparing food and eating? No   Do you have difficulty bathing yourself, getting dressed or grooming yourself? No   Do you have difficulty using the toilet? No   Do you have difficulty moving around from place to place? No   Do you have trouble with steps or getting out of a bed or a chair? No   Current Diet Well Balanced Diet   Dental Exam Not up to date   Eye Exam Up to date   Exercise (times per week) 3 times per week   Current Exercise Activities Include Walking   Do you need help using the phone?  No   Are you deaf or do you have serious difficulty hearing?  Yes   Do you need help with transportation? No   Do you need help shopping? No   Do you need help preparing meals?  No   Do you need help with housework?  No   Do you need help with laundry? No   Do you need help taking your medications? No   Do you need help managing money? No   Do you ever drive or ride in a car without wearing a seat belt? No   Have you felt unusual stress, anger or loneliness in the last month? No   Who do you live with? Alone   If you need help, do you have trouble finding someone available to you? No   Do you have difficulty concentrating, remembering or making decisions? No         Does the patient have evidence of cognitive impairment? No    Asprin use counseling:Contraindicated from taking ASA    Age-appropriate Screening Schedule:  Refer to the list below for future screening recommendations based on patient's age, sex and/or medical conditions. Orders for these recommended tests are listed in the plan section. The patient has  been provided with a written plan.    Health Maintenance   Topic Date Due   • DXA SCAN  Never done   • ZOSTER VACCINE (1 of 2) Never done   • MAMMOGRAM  01/10/2021   • INFLUENZA VACCINE  08/01/2021   • LIPID PANEL  03/03/2022   • TDAP/TD VACCINES (2 - Td) 06/15/2028          The following portions of the patient's history were reviewed and updated as appropriate: allergies, current medications, past family history, past medical history, past social history, past surgical history and problem list.    Past Medical History:   Diagnosis Date   • Abdominal pain, epigastric    • Abdominal pain, RLQ (right lower quadrant)    • Basal cell carcinoma (BCC) in situ of skin     left temple   • Diverticulosis of colon    • Easy bruising     on blood thinners   • Eczema 3/21/2018   • Esophageal reflux    • Fractures    • Gallstones    • GERD (gastroesophageal reflux disease)    • H/O mammogram 08/12/2016   • Hematuria, microscopic    • High cholesterol    • Hypertension    • Malignant neoplasm of skin    • Pap smear for cervical cancer screening 08/14/2015   • PFO (patent foramen ovale) 10/2016    per echo   • Positive TB test    • Post-cholecystectomy syndrome    • 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   • Right kidney mass 2016    found by Dr Cameron Hall   • Single kidney 01/26/2018   • Stroke (CMS/HCC) 10/2016   • Thyroid disease    • Thyroid disease      Past Surgical History:   Procedure Laterality Date   • CHOLECYSTECTOMY  12/04/2012   • COLONOSCOPY  10/10/2019    Dr Mattson, 5 yr repeat family hx colon ca   • NEPHRECTOMY Right 01/26/2018    Oncocytoma   • SKIN CANCER EXCISION     • WISDOM TOOTH EXTRACTION       Allergies   Allergen Reactions   • Wheat Rash   • Amoxicillin Rash   • Penicillins Rash       Family History   Problem Relation Age of Onset   • Arthritis Mother    • Diabetes Mother    • Cancer Father    • Colon cancer Father    • Diabetes Sister    • Heart attack Sister    • Diabetes Brother    • Heart attack  Brother    • Cancer Brother    • Colon polyps Brother    • Prostate cancer Brother    • Diabetes Son    • Diabetes Maternal Grandmother    • Cervical cancer Maternal Grandmother    • Cancer Brother         on lips   • Breast cancer Neg Hx    • Ovarian cancer Neg Hx        Social History     Socioeconomic History   • Marital status:      Spouse name: Not on file   • Number of children: Not on file   • Years of education: Not on file   • Highest education level: Not on file   Tobacco Use   • Smoking status: Former Smoker     Years: 2.00     Quit date: 1964     Years since quittin.3   • Smokeless tobacco: Never Used   • Tobacco comment: 2 years only   Substance and Sexual Activity   • Alcohol use: Yes     Comment: rarely drinks wine   • Drug use: No   • Sexual activity: Defer       Outpatient Medications Prior to Visit   Medication Sig Dispense Refill   • atorvastatin (LIPITOR) 80 MG tablet Take 0.5 tablets by mouth Every Night. 45 tablet 5   • Calcium-Magnesium-Vitamin D 600-300-400 liquid Take  by mouth.     • carvedilol (Coreg) 3.125 MG tablet Take 1 tablet by mouth 2 (Two) Times a Day With Meals. 60 tablet 3   • clopidogrel (PLAVIX) 75 MG tablet Take 1 tablet by mouth Daily. 90 tablet 1   • fluticasone (FLONASE) 50 MCG/ACT nasal spray 2 sprays into each nostril Daily.     • Multiple Vitamins-Minerals (MULTI VITAMIN/MINERALS PO) Take  by mouth Daily. Takes every other day     • NON FORMULARY Calcium, magnesium, citate with vit d3 (liquid)     • ramipril (ALTACE) 10 MG capsule Take 1 capsule by mouth Daily. 90 capsule 1   • sodium chloride (SALINE MIST) 0.65 % nasal spray 1 spray into the nostril(s) as directed by provider As Needed for Congestion.       No facility-administered medications prior to visit.       Patient Active Problem List   Diagnosis   • Acute ischemic CVA with resolved right sided deficits   • Essential hypertension   • Gastroesophageal reflux disease with esophagitis   • Vitamin D  deficiency   • Hematuria   • Mixed hyperlipidemia   • Mycosis fungoides (CMS/HCC)   • PFO (patent foramen ovale)   • Post-cholecystectomy syndrome   • History of embolic stroke without residual deficits   • Menopause   • Hematuria, microscopic   • Right kidney mass   • Anticoagulant long-term use   • Single kidney   • Diverticulosis of large intestine without hemorrhage   • Eczema       Advanced Care Planning:  ACP discussion was held with the patient during this visit. Patient has an advance directive in EMR which is still valid.     Review of Systems   Constitutional: Negative.  Negative for activity change, appetite change, chills, diaphoresis, fatigue, fever and unexpected weight change.   HENT: Positive for hearing loss and postnasal drip. Negative for congestion, dental problem, drooling, ear discharge, ear pain, facial swelling, mouth sores, nosebleeds, rhinorrhea, sinus pressure, sinus pain, sneezing, sore throat, tinnitus, trouble swallowing and voice change.    Eyes: Negative.  Negative for photophobia, pain, discharge, redness, itching and visual disturbance.   Respiratory: Negative.  Negative for apnea, cough, choking, chest tightness, shortness of breath, wheezing and stridor.    Cardiovascular: Negative.  Negative for chest pain, palpitations and leg swelling.   Gastrointestinal: Negative.  Negative for abdominal distention, abdominal pain, anal bleeding, blood in stool, constipation, diarrhea, nausea, rectal pain and vomiting.   Endocrine: Negative.  Negative for cold intolerance, heat intolerance, polydipsia, polyphagia and polyuria.   Genitourinary: Negative.  Negative for decreased urine volume, difficulty urinating, dyspareunia, dysuria, enuresis, flank pain, frequency, genital sores, hematuria, menstrual problem, pelvic pain, urgency, vaginal bleeding, vaginal discharge and vaginal pain.   Musculoskeletal: Positive for arthralgias (wrist pain if she does not wear wrist brace). Negative for back  "pain, gait problem, joint swelling, myalgias, neck pain and neck stiffness.   Skin: Negative.  Negative for color change, pallor, rash and wound.   Allergic/Immunologic: Positive for environmental allergies and food allergies. Negative for immunocompromised state.   Neurological: Positive for numbness (if she forgets to wear wrist brace at night). Negative for dizziness, tremors, seizures, syncope, facial asymmetry, speech difficulty, weakness, light-headedness and headaches.   Hematological: Negative.  Negative for adenopathy. Does not bruise/bleed easily.   Psychiatric/Behavioral: Negative.  Negative for agitation, behavioral problems, confusion, decreased concentration, dysphoric mood, hallucinations, self-injury, sleep disturbance and suicidal ideas. The patient is not nervous/anxious and is not hyperactive.        Compared to one year ago, the patient feels her physical health is the same.  Compared to one year ago, the patient feels her mental health is the same.    Reviewed chart for potential of high risk medication in the elderly: yes  Reviewed chart for potential of harmful drug interactions in the elderly:yes    Objective         Vitals:    05/11/21 0822 05/11/21 0916   BP: 138/72 160/70   BP Location: Right arm Right arm   Patient Position: Sitting Sitting   Cuff Size: Adult Adult   Pulse: 80    Temp: 97.7 °F (36.5 °C)    TempSrc: Infrared    SpO2: 99%    Weight: 65.8 kg (145 lb)    Height: 156 cm (61.4\")    PainSc: 0-No pain      Vitals:    05/11/21 0822 05/11/21 0916   BP: 138/72 160/70   BP Location: Right arm Right arm   Patient Position: Sitting Sitting   Cuff Size: Adult Adult   Pulse: 80    Temp: 97.7 °F (36.5 °C)    TempSrc: Infrared    SpO2: 99%    Weight: 65.8 kg (145 lb)    Height: 156 cm (61.4\")        Body mass index is 27.04 kg/m².  Discussed the patient's BMI with her. The BMI is above average; BMI management plan is completed.    Physical Exam  Vitals and nursing note reviewed. "   Constitutional:       Appearance: She is well-developed.   HENT:      Head: Normocephalic.      Right Ear: External ear normal.      Left Ear: External ear normal.      Nose: Nose normal.   Eyes:      General: Lids are normal.      Conjunctiva/sclera: Conjunctivae normal.      Pupils: Pupils are equal, round, and reactive to light.   Neck:      Thyroid: No thyroid mass or thyromegaly.      Vascular: No carotid bruit.      Trachea: Trachea normal.   Cardiovascular:      Rate and Rhythm: Normal rate and regular rhythm.      Heart sounds: No murmur heard.     Pulmonary:      Effort: Pulmonary effort is normal. No respiratory distress.      Breath sounds: Normal breath sounds. No decreased breath sounds, wheezing, rhonchi or rales.   Chest:      Chest wall: No tenderness.   Abdominal:      General: Bowel sounds are normal.      Palpations: Abdomen is soft.      Tenderness: There is no abdominal tenderness.   Musculoskeletal:         General: Normal range of motion.      Cervical back: Normal range of motion and neck supple.   Skin:     General: Skin is warm and dry.   Neurological:      Mental Status: She is alert and oriented to person, place, and time.   Psychiatric:         Behavior: Behavior normal.         Lab Results   Component Value Date    TRIG 87 03/03/2021    HDL 56 03/03/2021    LDL 71 03/03/2021    VLDL 16 03/03/2021    HGBA1C 6.04 (H) 03/03/2021        Assessment/Plan   Medicare Risks and Personalized Health Plan  CMS Preventative Services Quick Reference  Advance Directive Discussion  Breast Cancer/Mammogram Screening  Cardiovascular risk  Glaucoma Risk  Immunizations Discussed/Encouraged (specific immunizations; Pneumococcal 23 and Shingrix )  Obesity/Overweight   Osteoporosis Risk  Polypharmacy    The above risks/problems have been discussed with the patient.  Pertinent information has been shared with the patient in the After Visit Summary.  Follow up plans and orders are seen below in the  Assessment/Plan Section.    Diagnoses and all orders for this visit:    1. Medicare annual wellness visit, subsequent (Primary)    2. Encounter for screening mammogram for malignant neoplasm of breast  -     Mammo Screening Digital Tomosynthesis Bilateral With CAD; Future    3. Menopause  -     DEXA Bone Density Axial; Future      Follow Up:  Return in about 3 months (around 8/11/2021), or if symptoms worsen or fail to improve, for Recheck BP with nurse 2 weeks.     An After Visit Summary and PPPS were given to the patient.         Handouts to pt on Fall risk, advance care directives, healthy diets such as Mediterranean or Dash or calorie counting, and healthy exercising.     Please follow a low animal fat diet that is also low in sugar, low in junk food, low in sweet drinks and low in alcohol.  Please increase the amount of fiber in your diet as well as increasing your daily exercise, such as walking.    Recent Results (from the past 2352 hour(s))   Vitamin D 25 Hydroxy    Collection Time: 03/03/21  3:13 PM    Specimen: Blood   Result Value Ref Range    25 Hydroxy, Vitamin D 51.7 ng/ml   Hemoglobin A1c    Collection Time: 03/03/21  3:13 PM    Specimen: Blood   Result Value Ref Range    Hemoglobin A1C 6.04 (H) 4.80 - 5.60 %   Lipid Panel    Collection Time: 03/03/21  3:13 PM    Specimen: Blood   Result Value Ref Range    Total Cholesterol 143 0 - 200 mg/dL    Triglycerides 87 0 - 150 mg/dL    HDL Cholesterol 56 40 - 60 mg/dL    LDL Cholesterol  71 0 - 100 mg/dL    VLDL Cholesterol 16 5 - 40 mg/dL    LDL/HDL Ratio 1.24    Comprehensive Metabolic Panel    Collection Time: 03/03/21  3:13 PM    Specimen: Blood   Result Value Ref Range    Glucose 83 65 - 99 mg/dL    BUN 20 8 - 23 mg/dL    Creatinine 1.08 (H) 0.57 - 1.00 mg/dL    Sodium 129 (L) 136 - 145 mmol/L    Potassium 4.3 3.5 - 5.2 mmol/L    Chloride 95 (L) 98 - 107 mmol/L    CO2 24.9 22.0 - 29.0 mmol/L    Calcium 8.8 8.6 - 10.5 mg/dL    Total Protein 6.6 6.0 - 8.5  g/dL    Albumin 4.50 3.50 - 5.20 g/dL    ALT (SGPT) 16 1 - 33 U/L    AST (SGOT) 22 1 - 32 U/L    Alkaline Phosphatase 81 39 - 117 U/L    Total Bilirubin 0.7 0.0 - 1.2 mg/dL    eGFR Non African Amer 49 (L) >60 mL/min/1.73    Globulin 2.1 gm/dL    A/G Ratio 2.1 g/dL    BUN/Creatinine Ratio 18.5 7.0 - 25.0    Anion Gap 9.1 5.0 - 15.0 mmol/L   CBC Auto Differential    Collection Time: 03/03/21  3:13 PM    Specimen: Blood   Result Value Ref Range    WBC 6.48 3.40 - 10.80 10*3/mm3    RBC 4.63 3.77 - 5.28 10*6/mm3    Hemoglobin 14.0 12.0 - 15.9 g/dL    Hematocrit 40.8 34.0 - 46.6 %    MCV 88.1 79.0 - 97.0 fL    MCH 30.2 26.6 - 33.0 pg    MCHC 34.3 31.5 - 35.7 g/dL    RDW 12.2 (L) 12.3 - 15.4 %    RDW-SD 38.7 37.0 - 54.0 fl    MPV 11.6 6.0 - 12.0 fL    Platelets 169 140 - 450 10*3/mm3    Neutrophil % 66.7 42.7 - 76.0 %    Lymphocyte % 18.4 (L) 19.6 - 45.3 %    Monocyte % 13.3 (H) 5.0 - 12.0 %    Eosinophil % 0.6 0.3 - 6.2 %    Basophil % 0.5 0.0 - 1.5 %    Immature Grans % 0.5 0.0 - 0.5 %    Neutrophils, Absolute 4.33 1.70 - 7.00 10*3/mm3    Lymphocytes, Absolute 1.19 0.70 - 3.10 10*3/mm3    Monocytes, Absolute 0.86 0.10 - 0.90 10*3/mm3    Eosinophils, Absolute 0.04 0.00 - 0.40 10*3/mm3    Basophils, Absolute 0.03 0.00 - 0.20 10*3/mm3    Immature Grans, Absolute 0.03 0.00 - 0.05 10*3/mm3    nRBC 0.0 0.0 - 0.2 /100 WBC   POC Urinalysis Dipstick, Automated    Collection Time: 03/03/21  3:30 PM    Specimen: Urine   Result Value Ref Range    Color Yellow Yellow, Straw, Dark Yellow, Mary    Clarity, UA Clear Clear    Specific Gravity  1.015 1.005 - 1.030    pH, Urine 5.5 5.0 - 8.0    Leukocytes Negative Negative    Nitrite, UA Negative Negative    Protein, POC Negative Negative mg/dL    Glucose, UA Negative Negative, 1000 mg/dL (3+) mg/dL    Ketones, UA Negative Negative    Urobilinogen, UA Normal Normal    Bilirubin Negative Negative    Blood, UA 2+ (A) Negative   Basic Metabolic Panel    Collection Time: 03/11/21  3:01 PM     Specimen: Blood   Result Value Ref Range    Glucose 88 65 - 99 mg/dL    BUN 21 8 - 23 mg/dL    Creatinine 1.08 (H) 0.57 - 1.00 mg/dL    Sodium 133 (L) 136 - 145 mmol/L    Potassium 4.9 3.5 - 5.2 mmol/L    Chloride 99 98 - 107 mmol/L    CO2 29.0 22.0 - 29.0 mmol/L    Calcium 9.4 8.6 - 10.5 mg/dL    eGFR Non African Amer 49 (L) >60 mL/min/1.73    BUN/Creatinine Ratio 19.4 7.0 - 25.0    Anion Gap 5.0 5.0 - 15.0 mmol/L

## 2021-05-19 ENCOUNTER — TRANSCRIBE ORDERS (OUTPATIENT)
Dept: ADMINISTRATIVE | Facility: HOSPITAL | Age: 81
End: 2021-05-19

## 2021-05-19 ENCOUNTER — TELEPHONE (OUTPATIENT)
Dept: INTERNAL MEDICINE | Facility: CLINIC | Age: 81
End: 2021-05-19

## 2021-05-19 DIAGNOSIS — Z78.0 MENOPAUSE: Primary | ICD-10-CM

## 2021-05-19 NOTE — TELEPHONE ENCOUNTER
Caller: Anabell Vera    Relationship: Self    Best call back number: [phone]     What orders are you requesting (i.e. lab or imaging): BONE DENSITY    Where will you receive your lab/imaging services: [facility]    Additional notes: PATIENT WOULD LIKE THIS ORDER TO GO IN WITH HER MAMMOGRAM    PATIENT ASKED FOR A CALL WHEN THIS HAS BEEN DONE.         Letter:  Follow-up

## 2021-06-30 DIAGNOSIS — I10 ESSENTIAL HYPERTENSION: ICD-10-CM

## 2021-06-30 RX ORDER — CARVEDILOL 3.12 MG/1
TABLET ORAL
Qty: 60 TABLET | Refills: 3 | Status: SHIPPED | OUTPATIENT
Start: 2021-06-30 | End: 2021-10-25

## 2021-08-10 ENCOUNTER — HOSPITAL ENCOUNTER (OUTPATIENT)
Dept: MAMMOGRAPHY | Facility: HOSPITAL | Age: 81
Discharge: HOME | End: 2021-08-10

## 2021-08-10 ENCOUNTER — HOSPITAL ENCOUNTER (OUTPATIENT)
Dept: BONE DENSITY | Facility: HOSPITAL | Age: 81
Discharge: HOME | End: 2021-08-10

## 2021-08-10 DIAGNOSIS — Z78.0 MENOPAUSE: ICD-10-CM

## 2021-08-10 DIAGNOSIS — Z12.31 ENCOUNTER FOR SCREENING MAMMOGRAM FOR MALIGNANT NEOPLASM OF BREAST: ICD-10-CM

## 2021-08-10 PROCEDURE — 77067 SCR MAMMO BI INCL CAD: CPT | Performed by: RADIOLOGY

## 2021-08-10 PROCEDURE — 77067 SCR MAMMO BI INCL CAD: CPT

## 2021-08-10 PROCEDURE — 77063 BREAST TOMOSYNTHESIS BI: CPT | Performed by: RADIOLOGY

## 2021-08-10 PROCEDURE — 77080 DXA BONE DENSITY AXIAL: CPT

## 2021-08-10 PROCEDURE — 77063 BREAST TOMOSYNTHESIS BI: CPT

## 2021-08-12 ENCOUNTER — TELEPHONE (OUTPATIENT)
Dept: INTERNAL MEDICINE | Facility: CLINIC | Age: 81
End: 2021-08-12

## 2021-08-12 NOTE — TELEPHONE ENCOUNTER
----- Message from Alayna PEREZ MD sent at 8/10/2021  7:03 PM EDT -----  Patient has osteopenia with increased risk of fracture.  Because she has single kidney medication needs to be watched closely.  We can start Fosamax weekly or we can refer her to the bone clinic at .

## 2021-08-12 NOTE — TELEPHONE ENCOUNTER
Patient can take 600 mg of calcium twice daily.  There should be some vitamin D with this as her vitamin D has been low in the past.  Patient needs to exercise regularly, anything weightbearing such as dancing, walking, running, weight lifting.  To try and strengthen the bones.  Preferably a split dose of calcium because only so much will absorb at a time.

## 2021-08-12 NOTE — TELEPHONE ENCOUNTER
PN of results.  She does not want to start medication.  Wants to know if she can just increase her calcium.  She recently stopped milk.

## 2021-08-24 DIAGNOSIS — I10 ESSENTIAL HYPERTENSION: ICD-10-CM

## 2021-08-24 DIAGNOSIS — E78.5 HYPERLIPIDEMIA, UNSPECIFIED HYPERLIPIDEMIA TYPE: ICD-10-CM

## 2021-08-24 DIAGNOSIS — I63.9 ACUTE CVA (CEREBROVASCULAR ACCIDENT) (HCC): ICD-10-CM

## 2021-08-24 RX ORDER — RAMIPRIL 10 MG/1
10 CAPSULE ORAL DAILY
Qty: 90 CAPSULE | Refills: 1 | Status: SHIPPED | OUTPATIENT
Start: 2021-08-24 | End: 2022-02-22

## 2021-08-24 RX ORDER — CLOPIDOGREL BISULFATE 75 MG/1
75 TABLET ORAL DAILY
Qty: 90 TABLET | Refills: 1 | Status: SHIPPED | OUTPATIENT
Start: 2021-08-24 | End: 2022-02-22

## 2021-08-24 RX ORDER — ATORVASTATIN CALCIUM 80 MG/1
TABLET, FILM COATED ORAL
Qty: 45 TABLET | Refills: 5 | Status: SHIPPED | OUTPATIENT
Start: 2021-08-24 | End: 2022-08-29 | Stop reason: SDUPTHER

## 2021-10-23 DIAGNOSIS — I10 ESSENTIAL HYPERTENSION: ICD-10-CM

## 2021-10-24 PROCEDURE — U0004 COV-19 TEST NON-CDC HGH THRU: HCPCS | Performed by: FAMILY MEDICINE

## 2021-10-24 PROCEDURE — U0005 INFEC AGEN DETEC AMPLI PROBE: HCPCS | Performed by: FAMILY MEDICINE

## 2021-10-25 RX ORDER — CARVEDILOL 3.12 MG/1
TABLET ORAL
Qty: 60 TABLET | Refills: 3 | Status: SHIPPED | OUTPATIENT
Start: 2021-10-25 | End: 2022-01-23

## 2021-10-25 NOTE — TELEPHONE ENCOUNTER
Rx Refill Note  Requested Prescriptions     Pending Prescriptions Disp Refills   • carvedilol (COREG) 3.125 MG tablet [Pharmacy Med Name: CARVEDILOL 3.125MG TABLETS] 60 tablet 3     Sig: TAKE 1 TABLET BY MOUTH TWICE DAILY WITH MEALS      Last office visit with prescribing clinician: 5/11/2021      Next office visit with prescribing clinician: Visit date not found        3/3/2021    Sujatha Armendariz MA  10/25/21, 08:51 EDT

## 2021-10-27 ENCOUNTER — TELEPHONE (OUTPATIENT)
Dept: INTERNAL MEDICINE | Facility: CLINIC | Age: 81
End: 2021-10-27

## 2021-10-27 NOTE — TELEPHONE ENCOUNTER
PATIENT IS CALLING ABOUT HER COUGH.. SHE IS PRODUCING A LOT OF PHLEGM AND NEEDED A RECOMMENDATION FOR WHAT TO TAKE.. WENT TO URGENT CARE A FEW DAYS AGO AND THEY DID TESTING AND THEY SAID IT WAS A VIRAL INFECTION.. NOT REALLY STOPPED UP NO RUNNY NOSE, NO FEVER... SHE IS ASKING FOR A CALL BACK.

## 2021-10-27 NOTE — TELEPHONE ENCOUNTER
Please take Mucinex 600 mg twice daily to help loosen the mucus.  If she has fever, will need to be reevaluated.

## 2022-01-21 DIAGNOSIS — I10 ESSENTIAL HYPERTENSION: ICD-10-CM

## 2022-01-23 RX ORDER — CARVEDILOL 3.12 MG/1
TABLET ORAL
Qty: 60 TABLET | Refills: 0 | Status: SHIPPED | OUTPATIENT
Start: 2022-01-23 | End: 2022-02-22

## 2022-02-20 DIAGNOSIS — I10 ESSENTIAL HYPERTENSION: ICD-10-CM

## 2022-02-20 DIAGNOSIS — I63.9 ACUTE CVA (CEREBROVASCULAR ACCIDENT): ICD-10-CM

## 2022-02-21 NOTE — TELEPHONE ENCOUNTER
Last seen 5/11. No appointment scheduled.  Last filled carvedilol 1/23. Last filled plavix and Ramipril 8/24/21 for 90 day with 1 refill. LM that needs appointment

## 2022-02-22 DIAGNOSIS — I10 ESSENTIAL HYPERTENSION: ICD-10-CM

## 2022-02-22 DIAGNOSIS — I63.9 ACUTE CVA (CEREBROVASCULAR ACCIDENT): ICD-10-CM

## 2022-02-22 RX ORDER — CLOPIDOGREL BISULFATE 75 MG/1
75 TABLET ORAL DAILY
Qty: 30 TABLET | Refills: 0 | Status: SHIPPED | OUTPATIENT
Start: 2022-02-22 | End: 2022-03-30 | Stop reason: SDUPTHER

## 2022-02-22 RX ORDER — RAMIPRIL 10 MG/1
10 CAPSULE ORAL DAILY
Qty: 30 CAPSULE | Refills: 0 | Status: SHIPPED | OUTPATIENT
Start: 2022-02-22 | End: 2022-03-30 | Stop reason: SDUPTHER

## 2022-02-22 RX ORDER — CARVEDILOL 3.12 MG/1
TABLET ORAL
Qty: 60 TABLET | Refills: 0 | Status: SHIPPED | OUTPATIENT
Start: 2022-02-22 | End: 2022-03-22

## 2022-02-23 RX ORDER — CARVEDILOL 3.12 MG/1
TABLET ORAL
Qty: 180 TABLET | OUTPATIENT
Start: 2022-02-23

## 2022-02-23 RX ORDER — RAMIPRIL 10 MG/1
10 CAPSULE ORAL DAILY
Qty: 90 CAPSULE | OUTPATIENT
Start: 2022-02-23

## 2022-02-23 RX ORDER — CLOPIDOGREL BISULFATE 75 MG/1
75 TABLET ORAL DAILY
Qty: 90 TABLET | OUTPATIENT
Start: 2022-02-23

## 2022-03-22 ENCOUNTER — TELEPHONE (OUTPATIENT)
Dept: INTERNAL MEDICINE | Facility: CLINIC | Age: 82
End: 2022-03-22

## 2022-03-22 DIAGNOSIS — I10 ESSENTIAL HYPERTENSION: ICD-10-CM

## 2022-03-22 RX ORDER — CARVEDILOL 3.12 MG/1
TABLET ORAL
Qty: 28 TABLET | Refills: 0 | Status: SHIPPED | OUTPATIENT
Start: 2022-03-22 | End: 2022-03-30

## 2022-03-22 NOTE — TELEPHONE ENCOUNTER
Caller: Sheldon Tompkins    Relationship: Self    Best call back number: [phone]    What is the best time to reach you: ANYTIME     Who are you requesting to speak with (clinical staff, provider,  specific staff member): DR PABON    Do you know the name of the person who called: SHELDON TOMPKINS    What was the call regarding: CARVEDILOL  WANTS TO COME OFF OF THIS MEDICATION    Do you require a callback: YES

## 2022-03-23 NOTE — TELEPHONE ENCOUNTER
Patient's last visit here was May of last year.  Hard to decide whether she needs this medication or not.  Is she having side effects from it?  Needs an appointment

## 2022-03-23 NOTE — TELEPHONE ENCOUNTER
Appointment has been scheduled.  She would also like referral to GYN.  Will discuss at appointment

## 2022-03-28 DIAGNOSIS — I63.9 ACUTE CVA (CEREBROVASCULAR ACCIDENT): ICD-10-CM

## 2022-03-28 DIAGNOSIS — I10 ESSENTIAL HYPERTENSION: ICD-10-CM

## 2022-03-30 ENCOUNTER — OFFICE VISIT (OUTPATIENT)
Dept: INTERNAL MEDICINE | Facility: CLINIC | Age: 82
End: 2022-03-30

## 2022-03-30 ENCOUNTER — LAB (OUTPATIENT)
Dept: LAB | Facility: HOSPITAL | Age: 82
End: 2022-03-30

## 2022-03-30 VITALS
DIASTOLIC BLOOD PRESSURE: 80 MMHG | BODY MASS INDEX: 25.03 KG/M2 | OXYGEN SATURATION: 98 % | HEIGHT: 62 IN | TEMPERATURE: 97.8 F | WEIGHT: 136 LBS | SYSTOLIC BLOOD PRESSURE: 140 MMHG | HEART RATE: 70 BPM

## 2022-03-30 DIAGNOSIS — I63.9 ACUTE CVA (CEREBROVASCULAR ACCIDENT): ICD-10-CM

## 2022-03-30 DIAGNOSIS — R73.09 ABNORMAL GLUCOSE: ICD-10-CM

## 2022-03-30 DIAGNOSIS — I10 ESSENTIAL HYPERTENSION: ICD-10-CM

## 2022-03-30 DIAGNOSIS — N81.10 VAGINAL WALL PROLAPSE: Primary | ICD-10-CM

## 2022-03-30 DIAGNOSIS — E78.5 HYPERLIPIDEMIA, UNSPECIFIED HYPERLIPIDEMIA TYPE: ICD-10-CM

## 2022-03-30 DIAGNOSIS — R31.9 HEMATURIA, UNSPECIFIED TYPE: Primary | ICD-10-CM

## 2022-03-30 DIAGNOSIS — I10 PRIMARY HYPERTENSION: ICD-10-CM

## 2022-03-30 DIAGNOSIS — R31.29 MICROSCOPIC HEMATURIA: ICD-10-CM

## 2022-03-30 LAB
BASOPHILS # BLD AUTO: 0.03 10*3/MM3 (ref 0–0.2)
BASOPHILS NFR BLD AUTO: 0.4 % (ref 0–1.5)
CHOLEST SERPL-MCNC: 121 MG/DL (ref 0–200)
DEPRECATED RDW RBC AUTO: 40.4 FL (ref 37–54)
EOSINOPHIL # BLD AUTO: 0.05 10*3/MM3 (ref 0–0.4)
EOSINOPHIL NFR BLD AUTO: 0.7 % (ref 0.3–6.2)
ERYTHROCYTE [DISTWIDTH] IN BLOOD BY AUTOMATED COUNT: 12.4 % (ref 12.3–15.4)
HBA1C MFR BLD: 6.2 % (ref 4.8–5.6)
HCT VFR BLD AUTO: 43.1 % (ref 34–46.6)
HDLC SERPL-MCNC: 60 MG/DL (ref 40–60)
HGB BLD-MCNC: 14.5 G/DL (ref 12–15.9)
IMM GRANULOCYTES # BLD AUTO: 0.03 10*3/MM3 (ref 0–0.05)
IMM GRANULOCYTES NFR BLD AUTO: 0.4 % (ref 0–0.5)
LDLC SERPL CALC-MCNC: 46 MG/DL (ref 0–100)
LDLC/HDLC SERPL: 0.78 {RATIO}
LYMPHOCYTES # BLD AUTO: 1.78 10*3/MM3 (ref 0.7–3.1)
LYMPHOCYTES NFR BLD AUTO: 24.3 % (ref 19.6–45.3)
MCH RBC QN AUTO: 30 PG (ref 26.6–33)
MCHC RBC AUTO-ENTMCNC: 33.6 G/DL (ref 31.5–35.7)
MCV RBC AUTO: 89.2 FL (ref 79–97)
MONOCYTES # BLD AUTO: 0.85 10*3/MM3 (ref 0.1–0.9)
MONOCYTES NFR BLD AUTO: 11.6 % (ref 5–12)
NEUTROPHILS NFR BLD AUTO: 4.6 10*3/MM3 (ref 1.7–7)
NEUTROPHILS NFR BLD AUTO: 62.6 % (ref 42.7–76)
NRBC BLD AUTO-RTO: 0 /100 WBC (ref 0–0.2)
PLATELET # BLD AUTO: 165 10*3/MM3 (ref 140–450)
PMV BLD AUTO: 11.8 FL (ref 6–12)
RBC # BLD AUTO: 4.83 10*6/MM3 (ref 3.77–5.28)
TRIGL SERPL-MCNC: 71 MG/DL (ref 0–150)
TSH SERPL DL<=0.05 MIU/L-ACNC: 3.32 UIU/ML (ref 0.27–4.2)
VLDLC SERPL-MCNC: 15 MG/DL (ref 5–40)
WBC NRBC COR # BLD: 7.34 10*3/MM3 (ref 3.4–10.8)

## 2022-03-30 PROCEDURE — 83036 HEMOGLOBIN GLYCOSYLATED A1C: CPT | Performed by: FAMILY MEDICINE

## 2022-03-30 PROCEDURE — 80053 COMPREHEN METABOLIC PANEL: CPT | Performed by: FAMILY MEDICINE

## 2022-03-30 PROCEDURE — 85025 COMPLETE CBC W/AUTO DIFF WBC: CPT | Performed by: FAMILY MEDICINE

## 2022-03-30 PROCEDURE — 99214 OFFICE O/P EST MOD 30 MIN: CPT | Performed by: FAMILY MEDICINE

## 2022-03-30 PROCEDURE — 84443 ASSAY THYROID STIM HORMONE: CPT | Performed by: FAMILY MEDICINE

## 2022-03-30 PROCEDURE — 80061 LIPID PANEL: CPT | Performed by: FAMILY MEDICINE

## 2022-03-30 RX ORDER — RAMIPRIL 10 MG/1
10 CAPSULE ORAL DAILY
Qty: 30 CAPSULE | Refills: 0 | OUTPATIENT
Start: 2022-03-30

## 2022-03-30 RX ORDER — CLOPIDOGREL BISULFATE 75 MG/1
75 TABLET ORAL DAILY
Qty: 30 TABLET | Refills: 0 | OUTPATIENT
Start: 2022-03-30

## 2022-03-30 RX ORDER — RAMIPRIL 10 MG/1
10 CAPSULE ORAL DAILY
Qty: 30 CAPSULE | Refills: 3 | Status: SHIPPED | OUTPATIENT
Start: 2022-03-30 | End: 2022-06-30 | Stop reason: SDUPTHER

## 2022-03-30 RX ORDER — CLOPIDOGREL BISULFATE 75 MG/1
75 TABLET ORAL DAILY
Qty: 30 TABLET | Refills: 3 | Status: SHIPPED | OUTPATIENT
Start: 2022-03-30 | End: 2022-06-30 | Stop reason: SDUPTHER

## 2022-03-30 NOTE — PROGRESS NOTES
"Subjective   Anabell Vera is a 82 y.o. female.     Chief Complaint   Patient presents with   • Hypertension     Discuss stopping carvedilol.  Has stopped morning dose since March 15.     • GYN referral       Visit Vitals  /80   Pulse 70   Temp 97.8 °F (36.6 °C)   Ht 157.5 cm (62\")   Wt 61.7 kg (136 lb)   LMP  (LMP Unknown)   SpO2 98%   BMI 24.87 kg/m²         History of Present Illness   Pt has felt tired on the carvedilol and dropped the morning medication on 3/15/22.  Pt is feeling better on the lower dose.  Pt's BP at home is in normal range.   Pt would like to try to go off the carvedilol.     Pt is trying stress reducing exercises and breathing.     Pt has problems with vaginal fullness, which is causing difficulty urinating.   Pt has not had a hysterectomy.     Pt last took ramipril yesterday.  Patient needs refill of ramipril.    Pt has microscopy hematuria that was checked by Urology Dr Hall.   The following portions of the patient's history were reviewed and updated as appropriate: allergies, current medications, past family history, past medical history, past social history, past surgical history and problem list.    Past Medical History:   Diagnosis Date   • Abdominal pain, epigastric    • Abdominal pain, RLQ (right lower quadrant)    • Basal cell carcinoma (BCC) in situ of skin     left temple   • Diverticulosis of colon    • Easy bruising     on blood thinners   • Eczema 3/21/2018   • Esophageal reflux    • Fractures    • Gallstones    • GERD (gastroesophageal reflux disease)    • H/O mammogram 08/12/2016   • Hematuria, microscopic    • High cholesterol    • Hypertension    • Malignant neoplasm of skin    • Pap smear for cervical cancer screening 08/14/2015   • PFO (patent foramen ovale) 10/2016    per echo   • Positive TB test    • Post-cholecystectomy syndrome    • 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   • Right kidney mass 2016    found by Dr Cameron Hall   • Single kidney 01/26/2018   • " Stroke (HCC) 10/2016   • Thyroid disease    • Thyroid disease       Past Surgical History:   Procedure Laterality Date   • CHOLECYSTECTOMY  2012   • COLONOSCOPY  10/10/2019    Dr Mattson, 5 yr repeat family hx colon ca   • NEPHRECTOMY Right 2018    Oncocytoma   • SKIN CANCER EXCISION     • WISDOM TOOTH EXTRACTION        Family History   Problem Relation Age of Onset   • Arthritis Mother    • Diabetes Mother    • Cancer Father    • Colon cancer Father    • Diabetes Sister    • Heart attack Sister    • Diabetes Brother    • Heart attack Brother    • Cancer Brother    • Colon polyps Brother    • Prostate cancer Brother    • Diabetes Son    • Diabetes Maternal Grandmother    • Cervical cancer Maternal Grandmother    • Cancer Brother         on lips   • Breast cancer Neg Hx    • Ovarian cancer Neg Hx       Social History     Socioeconomic History   • Marital status:    Tobacco Use   • Smoking status: Former Smoker     Packs/day: 0.25     Years: 2.00     Pack years: 0.50     Quit date: 1964     Years since quittin.2   • Smokeless tobacco: Never Used   • Tobacco comment: 2 years only   Substance and Sexual Activity   • Alcohol use: Yes     Comment: rarely drinks wine   • Drug use: No   • Sexual activity: Defer      Allergies   Allergen Reactions   • Wheat Rash   • Amoxicillin Rash   • Penicillins Rash       Review of Systems   Constitutional: Positive for fatigue.   Genitourinary: Positive for difficulty urinating. Negative for dysuria.       Objective   Physical Exam  Vitals and nursing note reviewed. Exam conducted with a chaperone present (assisted by JAMES Majano CMA).   Constitutional:       Appearance: She is well-developed.   HENT:      Head: Normocephalic.      Right Ear: External ear normal.      Left Ear: External ear normal.      Nose: Nose normal.   Eyes:      General: Lids are normal.      Conjunctiva/sclera: Conjunctivae normal.      Pupils: Pupils are equal, round, and reactive to light.    Neck:      Thyroid: No thyroid mass or thyromegaly.      Vascular: No carotid bruit.      Trachea: Trachea normal.   Cardiovascular:      Rate and Rhythm: Normal rate and regular rhythm.      Heart sounds: No murmur heard.  Pulmonary:      Effort: Pulmonary effort is normal. No respiratory distress.      Breath sounds: Normal breath sounds. No decreased breath sounds, wheezing, rhonchi or rales.   Chest:      Chest wall: No tenderness.   Abdominal:      General: Bowel sounds are normal.      Palpations: Abdomen is soft.      Tenderness: There is no abdominal tenderness.   Genitourinary:     General: Normal vulva.      Pubic Area: No rash or pubic lice.       Labia:         Right: No rash, tenderness, lesion or injury.         Left: No rash, tenderness, lesion or injury.       Urethra: No prolapse, urethral pain, urethral swelling or urethral lesion.      Vagina: No signs of injury and foreign body. Prolapsed vaginal walls present. No vaginal discharge, erythema, tenderness, bleeding or lesions.      Adnexa:         Right: No mass, tenderness or fullness.          Left: No mass, tenderness or fullness.        Comments: Unable to visualize the CX.  No adnexal masses.  Musculoskeletal:         General: Normal range of motion.      Cervical back: Normal range of motion and neck supple.   Skin:     General: Skin is warm and dry.   Neurological:      Mental Status: She is alert and oriented to person, place, and time.   Psychiatric:         Behavior: Behavior normal.         Assessment/Plan   Diagnoses and all orders for this visit:    1. Vaginal wall prolapse (Primary)  -     Ambulatory Referral to Gynecology    2. Primary hypertension  -     Comprehensive Metabolic Panel; Future  -     TSH Rfx On Abnormal To Free T4; Future  -     Lipid Panel; Future  -     CBC & Differential; Future    3. Essential hypertension  -     ramipril (ALTACE) 10 MG capsule; Take 1 capsule by mouth Daily.  Dispense: 30 capsule; Refill:  3    4. Hyperlipidemia, unspecified hyperlipidemia type    5. 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  Dispense: 30 tablet; Refill: 3    6. Abnormal glucose  -     Hemoglobin A1c; Future    7. Microscopic hematuria  -     Urinalysis With Microscopic - Urine, Clean Catch; Future  -     Urine Culture - , Urine, Clean Catch; Future      Hold carvediol, call or follow up early if BP increases.  Patient to continue checking blood pressure at home.               Current Outpatient Medications:   •  atorvastatin (LIPITOR) 80 MG tablet, TAKE 1/2 TABLET BY MOUTH EVERY NIGHT, Disp: 45 tablet, Rfl: 5  •  Calcium-Magnesium-Vitamin D 600-300-400 liquid, Take  by mouth., Disp: , Rfl:   •  clopidogrel (PLAVIX) 75 MG tablet, Take 1 tablet by mouth Daily., Disp: 30 tablet, Rfl: 3  •  fluticasone (FLONASE) 50 MCG/ACT nasal spray, 2 sprays into each nostril Daily., Disp: , Rfl:   •  hydrocortisone 2.5 % cream, , Disp: , Rfl:   •  Multiple Vitamins-Minerals (MULTI VITAMIN/MINERALS PO), Take  by mouth Daily. Takes every other day, Disp: , Rfl:   •  ramipril (ALTACE) 10 MG capsule, Take 1 capsule by mouth Daily., Disp: 30 capsule, Rfl: 3  •  sodium chloride 0.65 % nasal spray, 1 spray into the nostril(s) as directed by provider As Needed for Congestion., Disp: , Rfl:     Return in about 3 months (around 6/30/2022), or if symptoms worsen or fail to improve, for Recheck bp in 2 weeks with nurse, [facility], [facility].

## 2022-03-31 ENCOUNTER — LAB (OUTPATIENT)
Dept: LAB | Facility: HOSPITAL | Age: 82
End: 2022-03-31

## 2022-03-31 DIAGNOSIS — R31.29 MICROSCOPIC HEMATURIA: ICD-10-CM

## 2022-03-31 LAB
ALBUMIN SERPL-MCNC: 5.1 G/DL (ref 3.5–5.2)
ALBUMIN/GLOB SERPL: 2.4 G/DL
ALP SERPL-CCNC: 71 U/L (ref 39–117)
ALT SERPL W P-5'-P-CCNC: 17 U/L (ref 1–33)
ANION GAP SERPL CALCULATED.3IONS-SCNC: 12.1 MMOL/L (ref 5–15)
AST SERPL-CCNC: 19 U/L (ref 1–32)
BACTERIA UR QL AUTO: NORMAL /HPF
BILIRUB SERPL-MCNC: 0.6 MG/DL (ref 0–1.2)
BILIRUB UR QL STRIP: NEGATIVE
BUN SERPL-MCNC: 20 MG/DL (ref 8–23)
BUN/CREAT SERPL: 17.9 (ref 7–25)
CALCIUM SPEC-SCNC: 10 MG/DL (ref 8.6–10.5)
CHLORIDE SERPL-SCNC: 94 MMOL/L (ref 98–107)
CLARITY UR: CLEAR
CO2 SERPL-SCNC: 25.9 MMOL/L (ref 22–29)
COLOR UR: YELLOW
CREAT SERPL-MCNC: 1.12 MG/DL (ref 0.57–1)
EGFRCR SERPLBLD CKD-EPI 2021: 49.5 ML/MIN/1.73
GLOBULIN UR ELPH-MCNC: 2.1 GM/DL
GLUCOSE SERPL-MCNC: 95 MG/DL (ref 65–99)
GLUCOSE UR STRIP-MCNC: NEGATIVE MG/DL
HGB UR QL STRIP.AUTO: ABNORMAL
HYALINE CASTS UR QL AUTO: NORMAL /LPF
KETONES UR QL STRIP: NEGATIVE
LEUKOCYTE ESTERASE UR QL STRIP.AUTO: ABNORMAL
NITRITE UR QL STRIP: NEGATIVE
PH UR STRIP.AUTO: 7 [PH] (ref 5–8)
POTASSIUM SERPL-SCNC: 4.6 MMOL/L (ref 3.5–5.2)
PROT SERPL-MCNC: 7.2 G/DL (ref 6–8.5)
PROT UR QL STRIP: NEGATIVE
RBC # UR STRIP: NORMAL /HPF
REF LAB TEST METHOD: NORMAL
SODIUM SERPL-SCNC: 132 MMOL/L (ref 136–145)
SP GR UR STRIP: 1.01 (ref 1–1.03)
SQUAMOUS #/AREA URNS HPF: NORMAL /HPF
UROBILINOGEN UR QL STRIP: ABNORMAL
WBC # UR STRIP: NORMAL /HPF

## 2022-03-31 PROCEDURE — 87086 URINE CULTURE/COLONY COUNT: CPT | Performed by: FAMILY MEDICINE

## 2022-03-31 PROCEDURE — 81001 URINALYSIS AUTO W/SCOPE: CPT | Performed by: FAMILY MEDICINE

## 2022-04-01 LAB — BACTERIA SPEC AEROBE CULT: NO GROWTH

## 2022-04-07 ENCOUNTER — OFFICE VISIT (OUTPATIENT)
Dept: OBSTETRICS AND GYNECOLOGY | Facility: CLINIC | Age: 82
End: 2022-04-07

## 2022-04-07 VITALS
WEIGHT: 137 LBS | RESPIRATION RATE: 14 BRPM | BODY MASS INDEX: 25.06 KG/M2 | SYSTOLIC BLOOD PRESSURE: 136 MMHG | DIASTOLIC BLOOD PRESSURE: 82 MMHG

## 2022-04-07 DIAGNOSIS — N81.11 CYSTOCELE, MIDLINE: Primary | ICD-10-CM

## 2022-04-07 PROBLEM — Z78.0 MENOPAUSE: Status: RESOLVED | Noted: 2017-08-02 | Resolved: 2022-04-07

## 2022-04-07 PROBLEM — Z01.419 WELL WOMAN EXAM: Status: ACTIVE | Noted: 2022-04-07

## 2022-04-07 PROBLEM — Z86.73 HISTORY OF EMBOLIC STROKE WITHOUT RESIDUAL DEFICITS: Status: RESOLVED | Noted: 2017-08-02 | Resolved: 2022-04-07

## 2022-04-07 PROCEDURE — 99203 OFFICE O/P NEW LOW 30 MIN: CPT | Performed by: OBSTETRICS & GYNECOLOGY

## 2022-04-07 NOTE — PROGRESS NOTES
Subjective   Chief Complaint   Patient presents with   • Vaginal Prolapse     Anabell Vera is a 82 y.o. year old .  No LMP recorded (lmp unknown). Patient is postmenopausal.  She comes today in referral from her primary care due to concerns of prolapse.  I made the appointment she accidentally noticed prolapse at the time she was showering.  She first noticed this around January.  She is continue to observe this it seems like it is coming farther out the opening.  She saw her primary care who thought she might have prolapse or rectocele and referred her here today.  Have testing for you has a sensation of urgency and frequency that is new.  She has not sexually active about 16 years ago.  She has no issues with constant I am and asked to not fecal trapping.  She has no pain.  The only thing she notices is the sensation of something  the vagina when she is on her feet for prolonged period of time.    OTHER THINGS SHE WANTS TO DISCUSS TODAY:  Nothing else    The following portions of the patient's history were reviewed and updated as appropriate:current medications, allergies, past medical history, past social history and past surgical history    Social History    Tobacco Use      Smoking status: Former Smoker        Packs/day: 0.25        Years: 2.00        Pack years: .5        Quit date:         Years since quittin.3      Smokeless tobacco: Never Used      Tobacco comment: 2 years only    Review of Systems  Constitutional POS: nothing reported    NEG: anorexia or night sweats   Genitourinary POS: see HPI    NEG: dysuria or hematuria   Gastointestinal POS: see HPI    NEG: bloating, change in bowel habits, melena or reflux symptoms   Integument POS: nothing reported    NEG: moles that are changing in size, shape, color or rashes   Breast POS: nothing reported    NEG: persistent breast lump, skin dimpling or nipple discharge         Objective   /82   Resp 14   Wt 62.1 kg (137 lb)    LMP  (LMP Unknown)   Breastfeeding No   BMI 25.06 kg/m²     General:  well developed; well nourished  no acute distress   Pelvis: Clinical staff was present for exam  External genitalia:  normal appearance of the external genitalia including Bartholin's and [place]ake's glands.  :  urethral meatus normal;  Vaginal:  normal pink mucosa without prolapse or lesions.  Cervix:  normal appearance.  Uterus:  normal size, shape and consistency.  Adnexa:  non palpable bilaterally.  Rectal:  digital rectal exam not performed; anus visually normal appearing.  Cystocele GRADE 3 (with Valsalva)  Rectocele GRADE 1  Uterine GRADE 1     Lab Review   No data reviewed    Imaging   No data reviewed        Assessment   1. Pelvic organ prolapse - this is a new finding.  It is symptomatic and affecting her quality of life.     Plan   1. Given the normal caliber of the introitus, I think she would do very well with the pessary to relieve her symptoms  2. The importance of keeping all planned follow-up and taking all medications as prescribed was emphasized.  3. Follow up for pessary fitting           This note was electronically signed.    Osman King M.D.  April 7, 2022    Part of this note may be an electronic transcription/translation of spoken language to printed text using the Dragon Dictation System.

## 2022-05-21 DIAGNOSIS — I63.9 ACUTE CVA (CEREBROVASCULAR ACCIDENT): ICD-10-CM

## 2022-05-21 DIAGNOSIS — I10 ESSENTIAL HYPERTENSION: ICD-10-CM

## 2022-05-23 RX ORDER — CLOPIDOGREL BISULFATE 75 MG/1
75 TABLET ORAL DAILY
Qty: 30 TABLET | Refills: 3 | OUTPATIENT
Start: 2022-05-23

## 2022-05-23 RX ORDER — RAMIPRIL 10 MG/1
10 CAPSULE ORAL DAILY
Qty: 30 CAPSULE | Refills: 3 | OUTPATIENT
Start: 2022-05-23

## 2022-06-23 DIAGNOSIS — I10 ESSENTIAL HYPERTENSION: ICD-10-CM

## 2022-06-23 DIAGNOSIS — I63.9 ACUTE CVA (CEREBROVASCULAR ACCIDENT): ICD-10-CM

## 2022-06-23 RX ORDER — CLOPIDOGREL BISULFATE 75 MG/1
75 TABLET ORAL DAILY
Qty: 30 TABLET | Refills: 3 | OUTPATIENT
Start: 2022-06-23

## 2022-06-23 RX ORDER — RAMIPRIL 10 MG/1
10 CAPSULE ORAL DAILY
Qty: 30 CAPSULE | Refills: 3 | OUTPATIENT
Start: 2022-06-23

## 2022-06-30 ENCOUNTER — OFFICE VISIT (OUTPATIENT)
Dept: INTERNAL MEDICINE | Facility: CLINIC | Age: 82
End: 2022-06-30

## 2022-06-30 VITALS
OXYGEN SATURATION: 97 % | DIASTOLIC BLOOD PRESSURE: 82 MMHG | SYSTOLIC BLOOD PRESSURE: 130 MMHG | HEIGHT: 62 IN | TEMPERATURE: 97.7 F | BODY MASS INDEX: 25.58 KG/M2 | HEART RATE: 66 BPM | WEIGHT: 139 LBS

## 2022-06-30 DIAGNOSIS — I63.9 ACUTE CVA (CEREBROVASCULAR ACCIDENT): ICD-10-CM

## 2022-06-30 DIAGNOSIS — Z00.00 MEDICARE ANNUAL WELLNESS VISIT, SUBSEQUENT: Primary | ICD-10-CM

## 2022-06-30 DIAGNOSIS — I10 ESSENTIAL HYPERTENSION: ICD-10-CM

## 2022-06-30 DIAGNOSIS — Z90.5 SINGLE KIDNEY: ICD-10-CM

## 2022-06-30 PROCEDURE — 1159F MED LIST DOCD IN RCRD: CPT | Performed by: FAMILY MEDICINE

## 2022-06-30 PROCEDURE — G0439 PPPS, SUBSEQ VISIT: HCPCS | Performed by: FAMILY MEDICINE

## 2022-06-30 PROCEDURE — 1170F FXNL STATUS ASSESSED: CPT | Performed by: FAMILY MEDICINE

## 2022-06-30 RX ORDER — RAMIPRIL 10 MG/1
10 CAPSULE ORAL DAILY
Qty: 90 CAPSULE | Refills: 1 | Status: SHIPPED | OUTPATIENT
Start: 2022-06-30 | End: 2022-12-28 | Stop reason: SDUPTHER

## 2022-06-30 RX ORDER — CLOPIDOGREL BISULFATE 75 MG/1
75 TABLET ORAL DAILY
Qty: 90 TABLET | Refills: 1 | Status: SHIPPED | OUTPATIENT
Start: 2022-06-30 | End: 2022-12-28 | Stop reason: SDUPTHER

## 2022-06-30 NOTE — PROGRESS NOTES
The ABCs of the Annual Wellness Visit  Subsequent Medicare Wellness Visit    Chief Complaint   Patient presents with   • Medicare Wellness-subsequent      Subjective    History of Present Illness:  Anabell Vera is a 82 y.o. female who presents for a Subsequent Medicare Wellness Visit.    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  Patient has hyperlipidemia which is stable.  Patient is taking atorvastatin.  Patient has enough atorvastatin currently.  Patient is taking ramipril 10 mg daily for her hypertension and needs refill of this.    Patient has history of CVA with resolved right-sided defects..  Patient needs refill of her clopidogrel, to decrease risk of blood clots.    The following portions of the patient's history were reviewed and   updated as appropriate: allergies, current medications, past family history, past medical history, past social history, past surgical history and problem list.    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   • Right kidney mass 2016    found by Dr Cameron Hall   • Single kidney 01/26/2018   • TIA (transient ischemic attack) 10/2016       Past Surgical History:   Procedure Laterality Date   • BASAL CELL CARCINOMA EXCISION  2006    left temple   • COLONOSCOPY  10/10/2019    Dr Mattson, 5 yr repeat family hx colon ca   • LAPAROSCOPIC CHOLECYSTECTOMY  12/04/2012   • LAPAROSCOPIC NEPHRECTOMY Right 01/26/2018    Oncocytoma   • WISDOM TOOTH EXTRACTION  1959       Allergies   Allergen Reactions   • Wheat Rash   • Amoxicillin Rash   • Penicillins  Rash       Family History   Problem Relation Age of Onset   • Arthritis Mother    • Diabetes Mother    • Cancer Father    • Colon cancer Father    • Diabetes Sister    • Heart attack Sister    • Diabetes Brother    • Heart attack Brother    • Cancer Brother    • Colon polyps Brother    • Prostate cancer Brother    • Diabetes Son    • Diabetes Maternal Grandmother    • Cervical cancer Maternal Grandmother    • Cancer Brother         on lips   • Breast cancer Neg Hx    • Ovarian cancer Neg Hx        Social History     Socioeconomic History   • Marital status:    Tobacco Use   • Smoking status: Former Smoker     Packs/day: 0.25     Years: 2.00     Pack years: 0.50     Quit date:      Years since quittin.5   • Smokeless tobacco: Never Used   • Tobacco comment: 2 years only   Substance and Sexual Activity   • Alcohol use: Yes     Comment: rarely drinks wine   • Drug use: No   • Sexual activity: Defer       Compared to one year ago, the patient feels her physical   health is the same.    Compared to one year ago, the patient feels her mental   health is the same.    Recent Hospitalizations:  She was not admitted to the hospital during the last year.       Current Medical Providers:  Patient Care Team:  Alayna Toscano MD as PCP - General (Family Medicine)  Jenny Turner MD as Consulting Physician (Dermatology)  Osman King MD as Obstetrician (Obstetrics and Gynecology)    Outpatient Medications Prior to Visit   Medication Sig Dispense Refill   • atorvastatin (LIPITOR) 80 MG tablet TAKE 1/2 TABLET BY MOUTH EVERY NIGHT 45 tablet 5   • Calcium-Magnesium-Vitamin D 600-300-400 liquid Take  by mouth. 2 tbsp daily     • fluticasone (FLONASE) 50 MCG/ACT nasal spray 2 sprays into each nostril Daily.     • hydrocortisone 2.5 % cream PRN     • Multiple Vitamins-Minerals (MULTI VITAMIN/MINERALS PO) Take  by mouth Daily. Takes every other day     • clopidogrel (PLAVIX) 75 MG tablet Take 1 tablet by  mouth Daily. 30 tablet 3   • ramipril (ALTACE) 10 MG capsule Take 1 capsule by mouth Daily. 30 capsule 3   • sodium chloride 0.65 % nasal spray 1 spray into the nostril(s) as directed by provider As Needed for Congestion.       No facility-administered medications prior to visit.       No opioid medication identified on active medication list. I have reviewed chart for other potential  high risk medication/s and harmful drug interactions in the elderly.          Aspirin is not on active medication list.  Aspirin use is contraindicated for this patient due to: current use of clopidogrel.  .    Patient Active Problem List   Diagnosis   • Gastroesophageal reflux disease with esophagitis   • Vitamin D deficiency   • Mixed hyperlipidemia   • PFO (patent foramen ovale)   • Anticoagulant long-term use   • Single kidney   • Diverticulosis of large intestine without hemorrhage   • Eczema   • Annual GYN exam in    • Cystocele, midline     Advance Care Planning  Advance Directive is on file.  ACP discussion was held with the patient during this visit. Patient has an advance directive in EMR which is still valid.     Review of Systems   Constitutional: Negative for activity change, appetite change, chills, diaphoresis, fatigue and fever.   HENT: Positive for hearing loss (worse the past yr) and tinnitus. Negative for congestion, dental problem, drooling, ear discharge, ear pain, facial swelling, mouth sores, nosebleeds, postnasal drip, rhinorrhea, sinus pressure, sneezing, sore throat, trouble swallowing and voice change.    Eyes: Positive for itching. Negative for photophobia, pain, discharge, redness and visual disturbance.   Respiratory: Negative for apnea, cough, choking, chest tightness, shortness of breath, wheezing and stridor.    Cardiovascular: Negative for chest pain, palpitations and leg swelling.   Gastrointestinal: Negative for abdominal distention, abdominal pain, anal bleeding, blood in stool, constipation,  "diarrhea, nausea, rectal pain and vomiting.   Endocrine: Negative for cold intolerance, heat intolerance, polydipsia, polyphagia and polyuria.   Genitourinary: Positive for frequency (prolasped bladder). Negative for decreased urine volume, difficulty urinating, dyspareunia, dysuria, enuresis, flank pain, genital sores, hematuria, menstrual problem, pelvic pain, urgency, vaginal bleeding, vaginal discharge and vaginal pain.   Musculoskeletal: Negative for arthralgias, back pain, gait problem, joint swelling, myalgias, neck pain and neck stiffness.   Skin: Positive for rash (when eating wheat). Negative for color change, pallor and wound.   Allergic/Immunologic: Positive for environmental allergies. Negative for food allergies and immunocompromised state.   Neurological: Positive for numbness (CTS bilateral that is intermittent). Negative for dizziness, tremors, seizures, syncope, facial asymmetry, speech difficulty, weakness, light-headedness and confusion.   Hematological: Negative for adenopathy. Bruises/bleeds easily (on blood thinners).   Psychiatric/Behavioral: Negative for agitation, behavioral problems, decreased concentration, dysphoric mood, hallucinations, self-injury, sleep disturbance and suicidal ideas. The patient is not nervous/anxious and is not hyperactive.         Objective    Vitals:    06/30/22 1414   BP: 130/82   Pulse: 66   Temp: 97.7 °F (36.5 °C)   SpO2: 97%   Weight: 63 kg (139 lb)   Height: 156.2 cm (61.5\")   PainSc: 0-No pain     Estimated body mass index is 25.84 kg/m² as calculated from the following:    Height as of this encounter: 156.2 cm (61.5\").    Weight as of this encounter: 63 kg (139 lb).    BMI is >= 25 and <30. (Overweight) The following options were offered after discussion;: weight loss educational material (shared in after visit summary) and exercise counseling/recommendations      Does the patient have evidence of cognitive impairment? No    Physical Exam  Vitals and " nursing note reviewed.   Constitutional:       General: She is not in acute distress.     Appearance: She is well-developed. She is not diaphoretic.   HENT:      Head: Normocephalic and atraumatic.      Right Ear: Tympanic membrane, ear canal and external ear normal.      Left Ear: Tympanic membrane, ear canal and external ear normal.      Nose: Nose normal.      Mouth/Throat:      Lips: Pink.      Mouth: Mucous membranes are moist. Mucous membranes are not pale, not dry and not cyanotic. No injury.      Dentition: Normal dentition.      Tongue: No lesions.      Palate: No mass.      Pharynx: Uvula midline. No pharyngeal swelling, oropharyngeal exudate or posterior oropharyngeal erythema.   Eyes:      General: Lids are normal. No scleral icterus.        Right eye: No foreign body, discharge or hordeolum.         Left eye: No foreign body, discharge or hordeolum.      Extraocular Movements:      Right eye: Normal extraocular motion and no nystagmus.      Left eye: Normal extraocular motion and no nystagmus.      Conjunctiva/sclera: Conjunctivae normal.      Right eye: Right conjunctiva is not injected. No chemosis, exudate or hemorrhage.     Left eye: Left conjunctiva is not injected. No chemosis, exudate or hemorrhage.     Pupils: Pupils are equal, round, and reactive to light.   Neck:      Thyroid: No thyroid mass or thyromegaly.      Vascular: No carotid bruit.      Trachea: Trachea normal. No tracheal deviation.   Cardiovascular:      Rate and Rhythm: Normal rate and regular rhythm.      Pulses:           Dorsalis pedis pulses are 2+ on the right side and 2+ on the left side.        Posterior tibial pulses are 2+ on the right side and 2+ on the left side.      Heart sounds: Normal heart sounds. No murmur heard.    No friction rub. No gallop.   Pulmonary:      Effort: Pulmonary effort is normal. No respiratory distress.      Breath sounds: Normal breath sounds. No decreased breath sounds, wheezing, rhonchi or  rales.   Chest:      Chest wall: No tenderness. There is no dullness to percussion.   Breasts:      Elier Score is 5.      Right: Normal. No swelling, bleeding, inverted nipple, mass, nipple discharge, skin change, tenderness, axillary adenopathy or supraclavicular adenopathy.      Left: Normal. No swelling, bleeding, inverted nipple, mass, nipple discharge, skin change, tenderness, axillary adenopathy or supraclavicular adenopathy.       Abdominal:      General: Bowel sounds are normal. There is no distension.      Palpations: Abdomen is soft. There is no hepatomegaly, splenomegaly or mass.      Tenderness: There is no abdominal tenderness. There is no guarding or rebound.      Hernia: No hernia is present.   Musculoskeletal:         General: No tenderness or deformity. Normal range of motion.      Cervical back: Normal range of motion and neck supple.      Right lower leg: No edema.      Left lower leg: No edema.   Lymphadenopathy:      Head:      Right side of head: No submandibular adenopathy.      Left side of head: No submandibular adenopathy.      Cervical: No cervical adenopathy.      Right cervical: No superficial, deep or posterior cervical adenopathy.     Left cervical: No superficial, deep or posterior cervical adenopathy.      Upper Body:      Right upper body: No supraclavicular, axillary or pectoral adenopathy.      Left upper body: No supraclavicular, axillary or pectoral adenopathy.   Skin:     General: Skin is warm and dry.      Findings: No rash.      Nails: There is no clubbing.   Neurological:      Mental Status: She is alert and oriented to person, place, and time.      Cranial Nerves: No cranial nerve deficit.      Sensory: No sensory deficit.      Coordination: Coordination normal.      Gait: Gait is intact.      Deep Tendon Reflexes: Reflexes are normal and symmetric.      Reflex Scores:       Bicep reflexes are 2+ on the right side and 2+ on the left side.       Brachioradialis reflexes  are 2+ on the right side and 2+ on the left side.       Patellar reflexes are 2+ on the right side and 2+ on the left side.  Psychiatric:         Attention and Perception: Attention normal.         Mood and Affect: Mood normal.         Speech: Speech normal.         Behavior: Behavior normal. Behavior is cooperative.         Thought Content: Thought content normal.         Cognition and Memory: Cognition normal.         Judgment: Judgment normal.                 HEALTH RISK ASSESSMENT    Smoking Status:  Social History     Tobacco Use   Smoking Status Former Smoker   • Packs/day: 0.25   • Years: 2.00   • Pack years: 0.50   • Quit date:    • Years since quittin.5   Smokeless Tobacco Never Used   Tobacco Comment    2 years only     Alcohol Consumption:  Social History     Substance and Sexual Activity   Alcohol Use Yes    Comment: rarely drinks wine     Fall Risk Screen:    PADMAJA Fall Risk Assessment was completed, and patient is at LOW risk for falls.Assessment completed on:2022    Depression Screening:  PHQ-2/PHQ-9 Depression Screening 2022   Retired PHQ-9 Total Score -   Retired Total Score -   Little Interest or Pleasure in Doing Things 0-->not at all   Feeling Down, Depressed or Hopeless 0-->not at all   PHQ-9: Brief Depression Severity Measure Score 0       Health Habits and Functional and Cognitive Screening:  Functional & Cognitive Status 2022   Do you have difficulty preparing food and eating? No   Do you have difficulty bathing yourself, getting dressed or grooming yourself? No   Do you have difficulty using the toilet? No   Do you have difficulty moving around from place to place? No   Do you have trouble with steps or getting out of a bed or a chair? No   Current Diet Well Balanced Diet   Dental Exam Up to date        Dental Exam Comment 2022   Eye Exam Not up to date   Exercise (times per week) 0 times per week   Current Exercises Include No Regular Exercise;Walking;Yard  Work;House Cleaning   Current Exercise Activities Include -   Do you need help using the phone?  No   Are you deaf or do you have serious difficulty hearing?  Yes   Do you need help with transportation? No   Do you need help shopping? No   Do you need help preparing meals?  No   Do you need help with housework?  No   Do you need help with laundry? No   Do you need help taking your medications? No   Do you need help managing money? No   Do you ever drive or ride in a car without wearing a seat belt? No   Have you felt unusual stress, anger or loneliness in the last month? No   Who do you live with? Alone   If you need help, do you have trouble finding someone available to you? No   Have you been bothered in the last four weeks by sexual problems? No   Do you have difficulty concentrating, remembering or making decisions? No       Age-appropriate Screening Schedule:  Refer to the list below for future screening recommendations based on patient's age, sex and/or medical conditions. Orders for these recommended tests are listed in the plan section. The patient has been provided with a written plan.    Health Maintenance   Topic Date Due   • ZOSTER VACCINE (1 of 2) Never done   • INFLUENZA VACCINE  10/01/2022   • LIPID PANEL  03/30/2023   • MAMMOGRAM  08/10/2023   • DXA SCAN  08/10/2023   • TDAP/TD VACCINES (2 - Td or Tdap) 06/15/2028              Assessment & Plan   CMS Preventative Services Quick Reference  Risk Factors Identified During Encounter  Hearing Problem  Immunizations Discussed/Encouraged (specific Immunizations; Prevnar 20 (Pneumococcal 20-valent conjugate) and Shingrix  The above risks/problems have been discussed with the patient.  Follow up actions/plans if indicated are seen below in the Assessment/Plan Section.  Pertinent information has been shared with the patient in the After Visit Summary.    Diagnoses and all orders for this visit:    1. Medicare annual wellness visit, subsequent (Primary)  -      Cancel: POCT urinalysis dipstick, automated    2. 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  Dispense: 90 tablet; Refill: 1    3. Essential hypertension  -     ramipril (ALTACE) 10 MG capsule; Take 1 capsule by mouth Daily.  Dispense: 90 capsule; Refill: 1    4. Single kidney        Follow Up:   Return in about 6 months (around 12/30/2022), or if symptoms worsen or fail to improve, for Recheck bp and llab.     An After Visit Summary and PPPS were made available to the patient.          I spent 36 minutes caring for Anabell on this date of service. This time includes time spent by me in the following activities:preparing for the visit, reviewing tests, obtaining and/or reviewing a separately obtained history, performing a medically appropriate examination and/or evaluation , counseling and educating the patient/family/caregiver, ordering medications, tests, or procedures and documenting information in the medical record         Please follow a low animal fat diet that is also low in sugar, low in junk food, low in sweet drinks and low in alcohol.  Please increase the amount of fiber in your diet as well as increasing your daily exercise, such as walking.    Handouts to pt on Fall risk, advance care directives, healthy diets such as Mediterranean or Dash or calorie counting, and healthy exercising.     Pt has handout on pessary from GYN.  Video handout on pelvic support treatment

## 2022-07-01 ENCOUNTER — LAB (OUTPATIENT)
Dept: LAB | Facility: HOSPITAL | Age: 82
End: 2022-07-01

## 2022-07-01 DIAGNOSIS — R82.90 ABNORMAL URINALYSIS: ICD-10-CM

## 2022-07-01 DIAGNOSIS — Z90.5 SINGLE KIDNEY: ICD-10-CM

## 2022-07-01 DIAGNOSIS — Z00.00 LABORATORY EXAM ORDERED AS PART OF ROUTINE GENERAL MEDICAL EXAMINATION: Primary | ICD-10-CM

## 2022-07-01 LAB
BILIRUB BLD-MCNC: NEGATIVE MG/DL
CLARITY, POC: CLEAR
COLOR UR: YELLOW
EXPIRATION DATE: ABNORMAL
GLUCOSE UR STRIP-MCNC: NEGATIVE MG/DL
KETONES UR QL: NEGATIVE
LEUKOCYTE EST, POC: ABNORMAL
Lab: ABNORMAL
NITRITE UR-MCNC: NEGATIVE MG/ML
PH UR: 8 [PH] (ref 5–8)
PROT UR STRIP-MCNC: NEGATIVE MG/DL
RBC # UR STRIP: ABNORMAL /UL
SP GR UR: 1.01 (ref 1–1.03)
UROBILINOGEN UR QL: NORMAL

## 2022-07-01 PROCEDURE — 87086 URINE CULTURE/COLONY COUNT: CPT | Performed by: FAMILY MEDICINE

## 2022-07-01 PROCEDURE — 81003 URINALYSIS AUTO W/O SCOPE: CPT | Performed by: FAMILY MEDICINE

## 2022-07-03 LAB — BACTERIA SPEC AEROBE CULT: NORMAL

## 2022-08-29 DIAGNOSIS — E78.5 HYPERLIPIDEMIA, UNSPECIFIED HYPERLIPIDEMIA TYPE: ICD-10-CM

## 2022-08-29 DIAGNOSIS — Z90.5 SINGLE KIDNEY: Primary | ICD-10-CM

## 2022-08-29 DIAGNOSIS — N39.9 DISORDER OF URINARY SYSTEM, UNSPECIFIED: ICD-10-CM

## 2022-08-29 RX ORDER — ATORVASTATIN CALCIUM 80 MG/1
40 TABLET, FILM COATED ORAL
Qty: 45 TABLET | Refills: 1 | Status: SHIPPED | OUTPATIENT
Start: 2022-08-29 | End: 2023-01-04 | Stop reason: SDUPTHER

## 2022-08-29 NOTE — TELEPHONE ENCOUNTER
Caller: Anabell Vera    Relationship: Self    Best call back number: [phone]    Requested Prescriptions:   Requested Prescriptions     Pending Prescriptions Disp Refills   • atorvastatin (LIPITOR) 80 MG tablet 45 tablet 5     Sig: Take 0.5 tablets by mouth every night at bedtime.        Pharmacy where request should be sent: [facility] #[id number] - [place], [place] - [address] [facility] [address] [facility] [phone] [facility] [phone] FX     Additional details provided by patient: PATIENT ONLY HAS 1 DOSE REMAINING, NEEDS IT BY TOMORROW 08/30/2022     Does the patient have less than a 3 day supply:  [x] Yes  [] No    Demetris Zeng Rep   08/29/22 12:03 EDT

## 2022-09-06 ENCOUNTER — TRANSCRIBE ORDERS (OUTPATIENT)
Dept: INTERNAL MEDICINE | Facility: CLINIC | Age: 82
End: 2022-09-06

## 2022-09-06 DIAGNOSIS — Z12.31 ENCOUNTER FOR SCREENING MAMMOGRAM FOR BREAST CANCER: Primary | ICD-10-CM

## 2022-09-13 ENCOUNTER — HOSPITAL ENCOUNTER (OUTPATIENT)
Dept: MAMMOGRAPHY | Facility: HOSPITAL | Age: 82
Discharge: HOME | End: 2022-09-13
Admitting: FAMILY MEDICINE

## 2022-09-13 DIAGNOSIS — Z12.31 ENCOUNTER FOR SCREENING MAMMOGRAM FOR BREAST CANCER: ICD-10-CM

## 2022-09-13 PROCEDURE — 77063 BREAST TOMOSYNTHESIS BI: CPT | Performed by: RADIOLOGY

## 2022-09-13 PROCEDURE — 77067 SCR MAMMO BI INCL CAD: CPT | Performed by: RADIOLOGY

## 2022-09-13 PROCEDURE — 77067 SCR MAMMO BI INCL CAD: CPT

## 2022-09-13 PROCEDURE — 77063 BREAST TOMOSYNTHESIS BI: CPT

## 2022-10-24 ENCOUNTER — TELEPHONE (OUTPATIENT)
Dept: INTERNAL MEDICINE | Facility: CLINIC | Age: 82
End: 2022-10-24

## 2022-10-24 DIAGNOSIS — H00.015 HORDEOLUM EXTERNUM OF LEFT LOWER EYELID: Primary | ICD-10-CM

## 2022-10-24 RX ORDER — POLYMYXIN B SULFATE AND TRIMETHOPRIM 1; 10000 MG/ML; [USP'U]/ML
1 SOLUTION OPHTHALMIC EVERY 6 HOURS
Qty: 10 ML | Refills: 0 | Status: SHIPPED | OUTPATIENT
Start: 2022-10-24 | End: 2023-01-04

## 2022-10-24 NOTE — TELEPHONE ENCOUNTER
Caller: Anabell Vera    Relationship: Self    Best call back number: [phone] OR  [phone]  What medication are you requesting:     POLYMYXIN B SULFATE TRIMETHOPRIMOPHTH SOLUTION     What are your current symptoms:  REDNESS TO LEFT LOWER EYE LID, READ AREA WITH A PIMPLE    How long have you been experiencing symptoms: 1 WEEK    Have you had these symptoms before:    [x] Yes  [] No    Have you been treated for these symptoms before:   [x] Yes  [] No    If a prescription is needed, what is your preferred pharmacy and phone number:      [facility]  TELEPHONE  [phone]    Additional notes: GOING ON VACATION 10.28.22

## 2022-10-25 ENCOUNTER — TELEPHONE (OUTPATIENT)
Dept: INTERNAL MEDICINE | Facility: CLINIC | Age: 82
End: 2022-10-25

## 2022-12-28 DIAGNOSIS — I10 ESSENTIAL HYPERTENSION: ICD-10-CM

## 2022-12-28 DIAGNOSIS — I63.9 ACUTE CVA (CEREBROVASCULAR ACCIDENT): ICD-10-CM

## 2022-12-28 RX ORDER — CLOPIDOGREL BISULFATE 75 MG/1
75 TABLET ORAL DAILY
Qty: 30 TABLET | Refills: 0 | Status: SHIPPED | OUTPATIENT
Start: 2022-12-28 | End: 2023-01-04 | Stop reason: SDUPTHER

## 2022-12-28 RX ORDER — RAMIPRIL 10 MG/1
10 CAPSULE ORAL DAILY
Qty: 30 CAPSULE | Refills: 0 | Status: SHIPPED | OUTPATIENT
Start: 2022-12-28 | End: 2023-01-04 | Stop reason: SDUPTHER

## 2022-12-28 NOTE — TELEPHONE ENCOUNTER
Rx Refill Note  Requested Prescriptions     Pending Prescriptions Disp Refills   • ramipril (ALTACE) 10 MG capsule 90 capsule 1     Sig: Take 1 capsule by mouth Daily.   • clopidogrel (PLAVIX) 75 MG tablet 90 tablet 1     Sig: Take 1 tablet by mouth Daily.      Last filled:   Last office visit with prescribing clinician: 6/30/2022      Next office visit with prescribing clinician: 1/4/2023 April JAMES Cardona MA  12/28/22, 15:15 EST

## 2022-12-28 NOTE — TELEPHONE ENCOUNTER
Caller: Anabell Vera    Relationship: Self    Best call back number: [phone]    Requested Prescriptions:   Requested Prescriptions     Pending Prescriptions Disp Refills   • ramipril (ALTACE) 10 MG capsule 90 capsule 1     Sig: Take 1 capsule by mouth Daily.   • clopidogrel (PLAVIX) 75 MG tablet 90 tablet 1     Sig: Take 1 tablet by mouth Daily.        Pharmacy where request should be sent: [id number] - [facility] [address] AT [facility] [address] [facility] [phone] [facility] [phone]      Additional details provided by patient: THE PATIENT STATES THAT SHE IS OUT OF THE RAMIPRIL AND HAS 5 PILLS LEFT OF CLOPIDOGREL     Does the patient have less than a 3 day supply:  [x] Yes  [] No    Would you like a call back once the refill request has been completed: [x] Yes [] No    If the office needs to give you a call back, can they leave a voicemail: [x] Yes [] No    Demetris Person Rep   12/28/22 15:07 EST

## 2023-01-04 ENCOUNTER — LAB (OUTPATIENT)
Dept: INTERNAL MEDICINE | Facility: CLINIC | Age: 83
End: 2023-01-04
Payer: MEDICARE

## 2023-01-04 ENCOUNTER — OFFICE VISIT (OUTPATIENT)
Dept: INTERNAL MEDICINE | Facility: CLINIC | Age: 83
End: 2023-01-04
Payer: MEDICARE

## 2023-01-04 VITALS
HEIGHT: 62 IN | DIASTOLIC BLOOD PRESSURE: 80 MMHG | SYSTOLIC BLOOD PRESSURE: 138 MMHG | OXYGEN SATURATION: 98 % | BODY MASS INDEX: 25.03 KG/M2 | WEIGHT: 136 LBS | HEART RATE: 69 BPM | TEMPERATURE: 97.8 F

## 2023-01-04 DIAGNOSIS — I10 ESSENTIAL HYPERTENSION: ICD-10-CM

## 2023-01-04 DIAGNOSIS — E78.5 HYPERLIPIDEMIA, UNSPECIFIED HYPERLIPIDEMIA TYPE: ICD-10-CM

## 2023-01-04 DIAGNOSIS — N81.11 CYSTOCELE, MIDLINE: ICD-10-CM

## 2023-01-04 DIAGNOSIS — E55.9 VITAMIN D DEFICIENCY: ICD-10-CM

## 2023-01-04 DIAGNOSIS — N39.9 DISORDER OF URINARY SYSTEM, UNSPECIFIED: ICD-10-CM

## 2023-01-04 DIAGNOSIS — I10 ESSENTIAL HYPERTENSION: Primary | ICD-10-CM

## 2023-01-04 DIAGNOSIS — Z79.01 ANTICOAGULANT LONG-TERM USE: ICD-10-CM

## 2023-01-04 DIAGNOSIS — I63.9 ACUTE CVA (CEREBROVASCULAR ACCIDENT): ICD-10-CM

## 2023-01-04 DIAGNOSIS — N18.30 STAGE 3 CHRONIC KIDNEY DISEASE, UNSPECIFIED WHETHER STAGE 3A OR 3B CKD: ICD-10-CM

## 2023-01-04 DIAGNOSIS — Z90.5 SINGLE KIDNEY: ICD-10-CM

## 2023-01-04 DIAGNOSIS — R31.21 ASYMPTOMATIC MICROSCOPIC HEMATURIA: ICD-10-CM

## 2023-01-04 LAB
BASOPHILS # BLD AUTO: 0.02 10*3/MM3 (ref 0–0.2)
BASOPHILS NFR BLD AUTO: 0.4 % (ref 0–1.5)
DEPRECATED RDW RBC AUTO: 38.9 FL (ref 37–54)
EOSINOPHIL # BLD AUTO: 0.07 10*3/MM3 (ref 0–0.4)
EOSINOPHIL NFR BLD AUTO: 1.3 % (ref 0.3–6.2)
ERYTHROCYTE [DISTWIDTH] IN BLOOD BY AUTOMATED COUNT: 12.2 % (ref 12.3–15.4)
HCT VFR BLD AUTO: 41 % (ref 34–46.6)
HGB BLD-MCNC: 14 G/DL (ref 12–15.9)
IMM GRANULOCYTES # BLD AUTO: 0.02 10*3/MM3 (ref 0–0.05)
IMM GRANULOCYTES NFR BLD AUTO: 0.4 % (ref 0–0.5)
LYMPHOCYTES # BLD AUTO: 1.23 10*3/MM3 (ref 0.7–3.1)
LYMPHOCYTES NFR BLD AUTO: 23 % (ref 19.6–45.3)
MCH RBC QN AUTO: 30.2 PG (ref 26.6–33)
MCHC RBC AUTO-ENTMCNC: 34.1 G/DL (ref 31.5–35.7)
MCV RBC AUTO: 88.4 FL (ref 79–97)
MONOCYTES # BLD AUTO: 0.87 10*3/MM3 (ref 0.1–0.9)
MONOCYTES NFR BLD AUTO: 16.3 % (ref 5–12)
NEUTROPHILS NFR BLD AUTO: 3.13 10*3/MM3 (ref 1.7–7)
NEUTROPHILS NFR BLD AUTO: 58.6 % (ref 42.7–76)
NRBC BLD AUTO-RTO: 0 /100 WBC (ref 0–0.2)
PLATELET # BLD AUTO: 150 10*3/MM3 (ref 140–450)
PMV BLD AUTO: 11.7 FL (ref 6–12)
RBC # BLD AUTO: 4.64 10*6/MM3 (ref 3.77–5.28)
WBC NRBC COR # BLD: 5.34 10*3/MM3 (ref 3.4–10.8)

## 2023-01-04 PROCEDURE — 99214 OFFICE O/P EST MOD 30 MIN: CPT | Performed by: FAMILY MEDICINE

## 2023-01-04 PROCEDURE — 85025 COMPLETE CBC W/AUTO DIFF WBC: CPT | Performed by: FAMILY MEDICINE

## 2023-01-04 PROCEDURE — 84100 ASSAY OF PHOSPHORUS: CPT | Performed by: FAMILY MEDICINE

## 2023-01-04 PROCEDURE — 82306 VITAMIN D 25 HYDROXY: CPT | Performed by: FAMILY MEDICINE

## 2023-01-04 PROCEDURE — 80053 COMPREHEN METABOLIC PANEL: CPT | Performed by: FAMILY MEDICINE

## 2023-01-04 PROCEDURE — 99999 PR OFFICE/OUTPT VISIT,PROCEDURE ONLY: CPT | Performed by: FAMILY MEDICINE

## 2023-01-04 PROCEDURE — 36415 COLL VENOUS BLD VENIPUNCTURE: CPT | Performed by: FAMILY MEDICINE

## 2023-01-04 PROCEDURE — 80061 LIPID PANEL: CPT | Performed by: FAMILY MEDICINE

## 2023-01-04 RX ORDER — CLOPIDOGREL BISULFATE 75 MG/1
75 TABLET ORAL DAILY
Qty: 90 TABLET | Refills: 1 | Status: SHIPPED | OUTPATIENT
Start: 2023-01-04

## 2023-01-04 RX ORDER — ATORVASTATIN CALCIUM 80 MG/1
40 TABLET, FILM COATED ORAL
Qty: 45 TABLET | Refills: 1 | Status: SHIPPED | OUTPATIENT
Start: 2023-01-04

## 2023-01-04 RX ORDER — RAMIPRIL 10 MG/1
10 CAPSULE ORAL DAILY
Qty: 90 CAPSULE | Refills: 1 | Status: SHIPPED | OUTPATIENT
Start: 2023-01-04

## 2023-01-04 NOTE — PROGRESS NOTES
Subjective   Anabell Vera is a 82 y.o. female.     Chief Complaint   Patient presents with   • Hypertension       Visit Vitals  /80   Pulse 69   Temp 97.8 °F (36.6 °C)   Ht 156.2 cm (61.5\")   Wt 61.7 kg (136 lb)   LMP  (LMP Unknown)   SpO2 98%   BMI 25.28 kg/m²         Hypertension  This is a chronic problem. The current episode started more than 1 year ago. The problem is unchanged. The problem is controlled. Pertinent negatives include no anxiety, blurred vision, chest pain, headaches, malaise/fatigue, neck pain, orthopnea, palpitations, peripheral edema, PND, shortness of breath or sweats. There are no associated agents to hypertension. Risk factors for coronary artery disease include dyslipidemia, family history and post-menopausal state. Current antihypertension treatment includes ACE inhibitors. The current treatment provides significant improvement. There are no compliance problems.  Hypertensive end-organ damage includes kidney disease and CVA. There is no history of angina, CAD/MI, heart failure, left ventricular hypertrophy, PVD or retinopathy. Identifiable causes of hypertension include chronic renal disease. There is no history of sleep apnea or a thyroid problem.   Hyperlipidemia  This is a chronic problem. The current episode started more than 1 year ago. The problem is controlled. Recent lipid tests were reviewed and are normal. Exacerbating diseases include chronic renal disease. She has no history of diabetes, hypothyroidism, liver disease, obesity or nephrotic syndrome. There are no known factors aggravating her hyperlipidemia. Pertinent negatives include no chest pain, focal sensory loss, focal weakness, leg pain, myalgias or shortness of breath. Current antihyperlipidemic treatment includes statins. The current treatment provides significant improvement of lipids. There are no compliance problems.  Risk factors for coronary artery disease include dyslipidemia, hypertension, family history  and post-menopausal.      Pt is hoarse with a mild sore throat and some nasal congestion. Pt denies fever.  Pt had covid vaccine in October 2022.   Pt had TIA in 2016 and is doing well.    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   Pt might get a pessary for prolapsed bladder.   The following portions of the patient's history were reviewed and updated as appropriate: allergies, current medications, past family history, past medical history, past social history, past surgical history and problem list.    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   • Right kidney mass 2016    found by Dr Cameron Hall   • Single kidney 01/26/2018   • TIA (transient ischemic attack) 10/2016      Past Surgical History:   Procedure Laterality Date   • BASAL CELL CARCINOMA EXCISION  2006    left temple   • COLONOSCOPY  10/10/2019    Dr Mattson, 5 yr repeat family hx colon ca   • LAPAROSCOPIC CHOLECYSTECTOMY  12/04/2012   • LAPAROSCOPIC NEPHRECTOMY Right 01/26/2018    Oncocytoma   • WISDOM TOOTH EXTRACTION  1959      Family History   Problem Relation Age of Onset   • Arthritis Mother    • Diabetes Mother    • Cancer Father    • Colon cancer Father    • Diabetes Sister    • Heart attack Sister    • Diabetes Brother    • Heart attack Brother    • Cancer Brother    • Colon polyps Brother    • Prostate cancer Brother    • Diabetes Son    • Diabetes Maternal Grandmother    • Cervical cancer Maternal Grandmother    • Cancer Brother          on lips   • Breast cancer Neg Hx    • Ovarian cancer Neg Hx       Social History     Socioeconomic History   • Marital status:    Tobacco Use   • Smoking status: Former     Packs/day: 0.25     Years: 2.00     Pack years: 0.50     Types: Cigarettes     Quit date: 1964     Years since quittin.0   • Smokeless tobacco: Never   • Tobacco comments:     2 years only   Substance and Sexual Activity   • Alcohol use: Yes     Comment: rarely drinks wine   • Drug use: No   • Sexual activity: Defer      Allergies   Allergen Reactions   • Wheat Rash   • Amoxicillin Rash   • Penicillins Rash       Review of Systems   Constitutional: Negative for malaise/fatigue.   HENT: Positive for congestion, hearing loss, postnasal drip and rhinorrhea.    Eyes: Negative for blurred vision.   Respiratory: Negative for shortness of breath.    Cardiovascular: Negative for chest pain, palpitations, orthopnea and PND.   Musculoskeletal: Negative for myalgias and neck pain.   Neurological: Negative for focal weakness and headaches.     PHQ-2 Depression Screening  Little interest or pleasure in doing things? 0-->not at all   Feeling down, depressed, or hopeless? 0-->not at all   PHQ-2 Total Score 0       Objective   Physical Exam  Vitals and nursing note reviewed.   Constitutional:       Appearance: She is well-developed.   HENT:      Head: Normocephalic.      Right Ear: External ear normal. There is impacted cerumen.      Left Ear: Tympanic membrane, ear canal and external ear normal.      Nose: Congestion present.      Right Sinus: No maxillary sinus tenderness or frontal sinus tenderness.      Left Sinus: No maxillary sinus tenderness or frontal sinus tenderness.      Mouth/Throat:      Lips: Pink.      Mouth: Mucous membranes are moist. No injury.      Dentition: Normal dentition.      Tongue: No lesions.      Palate: No mass.      Pharynx: Oropharynx is clear. Uvula midline. No pharyngeal swelling, oropharyngeal exudate, posterior  oropharyngeal erythema or uvula swelling.   Eyes:      General: Lids are normal.      Conjunctiva/sclera: Conjunctivae normal.      Pupils: Pupils are equal, round, and reactive to light.   Neck:      Thyroid: No thyroid mass or thyromegaly.      Vascular: No carotid bruit.      Trachea: Trachea normal.   Cardiovascular:      Rate and Rhythm: Normal rate and regular rhythm.      Heart sounds: No murmur heard.  Pulmonary:      Effort: Pulmonary effort is normal. No respiratory distress.      Breath sounds: Normal breath sounds. No decreased breath sounds, wheezing, rhonchi or rales.   Chest:      Chest wall: No tenderness.   Abdominal:      General: Bowel sounds are normal.      Palpations: Abdomen is soft.      Tenderness: There is no abdominal tenderness.   Musculoskeletal:         General: Normal range of motion.      Cervical back: Normal range of motion and neck supple.   Skin:     General: Skin is warm and dry.   Neurological:      Mental Status: She is alert and oriented to person, place, and time.   Psychiatric:         Behavior: Behavior normal.         Cognition and Memory: Cognition normal.         Assessment & Plan   Diagnoses and all orders for this visit:    1. Essential hypertension (Primary)  -     ramipril (ALTACE) 10 MG capsule; Take 1 capsule by mouth Daily.  Dispense: 90 capsule; Refill: 1  -     Comprehensive Metabolic Panel; Future  -     Lipid Panel; Future  -     CBC & Differential; Future    2. 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  Dispense: 90 tablet; Refill: 1    3. Hyperlipidemia, unspecified hyperlipidemia type  -     atorvastatin (LIPITOR) 80 MG tablet; Take 0.5 tablets by mouth every night at bedtime.  Dispense: 45 tablet; Refill: 1  -     Comprehensive Metabolic Panel; Future  -     Lipid Panel; Future    4. Asymptomatic microscopic hematuria  -     Urinalysis With Microscopic - Urine, Clean Catch;  Future    5. Anticoagulant long-term use    6. Cystocele, midline    7. Single kidney    8. Vitamin D deficiency  -     Vitamin D,25-Hydroxy; Future    9. Stage 3 chronic kidney disease, unspecified whether stage 3a or 3b CKD (HCC)  -     Phosphorus; Future      Discussed getting pneumonia vaccine 20. Pt will check with pharmacy  Pt is up to date on Covid and flu vaccine.            Current Outpatient Medications:   •  atorvastatin (LIPITOR) 80 MG tablet, Take 0.5 tablets by mouth every night at bedtime., Disp: 45 tablet, Rfl: 1  •  Calcium-Magnesium-Vitamin D 600-300-400 liquid, Take  by mouth. 2 tbsp daily, Disp: , Rfl:   •  clopidogrel (PLAVIX) 75 MG tablet, Take 1 tablet by mouth Daily., Disp: 90 tablet, Rfl: 1  •  fluticasone (FLONASE) 50 MCG/ACT nasal spray, 2 sprays into each nostril Daily., Disp: , Rfl:   •  hydrocortisone 2.5 % cream, PRN, Disp: , Rfl:   •  Multiple Vitamins-Minerals (MULTI VITAMIN/MINERALS PO), Take  by mouth Daily. Takes every other day, Disp: , Rfl:   •  ramipril (ALTACE) 10 MG capsule, Take 1 capsule by mouth Daily., Disp: 90 capsule, Rfl: 1    Return in about 6 months (around 7/4/2023), or if symptoms worsen or fail to improve, for Medicare Wellness, Recheck.

## 2023-01-05 ENCOUNTER — TELEPHONE (OUTPATIENT)
Dept: INTERNAL MEDICINE | Facility: CLINIC | Age: 83
End: 2023-01-05
Payer: MEDICARE

## 2023-01-05 DIAGNOSIS — D72.828 HIGH BLOOD MONOCYTE COUNT: Primary | ICD-10-CM

## 2023-01-05 LAB
25(OH)D3 SERPL-MCNC: 58.3 NG/ML (ref 30–100)
ALBUMIN SERPL-MCNC: 4.9 G/DL (ref 3.5–5.2)
ALBUMIN/GLOB SERPL: 2.5 G/DL
ALP SERPL-CCNC: 67 U/L (ref 39–117)
ALT SERPL W P-5'-P-CCNC: 14 U/L (ref 1–33)
ANION GAP SERPL CALCULATED.3IONS-SCNC: 8 MMOL/L (ref 5–15)
AST SERPL-CCNC: 19 U/L (ref 1–32)
BILIRUB SERPL-MCNC: 0.5 MG/DL (ref 0–1.2)
BUN SERPL-MCNC: 20 MG/DL (ref 8–23)
BUN/CREAT SERPL: 17.4 (ref 7–25)
CALCIUM SPEC-SCNC: 9.7 MG/DL (ref 8.6–10.5)
CHLORIDE SERPL-SCNC: 96 MMOL/L (ref 98–107)
CHOLEST SERPL-MCNC: 142 MG/DL (ref 0–200)
CO2 SERPL-SCNC: 27 MMOL/L (ref 22–29)
CREAT SERPL-MCNC: 1.15 MG/DL (ref 0.57–1)
EGFRCR SERPLBLD CKD-EPI 2021: 48 ML/MIN/1.73
GLOBULIN UR ELPH-MCNC: 2 GM/DL
GLUCOSE SERPL-MCNC: 95 MG/DL (ref 65–99)
HDLC SERPL-MCNC: 53 MG/DL (ref 40–60)
LDLC SERPL CALC-MCNC: 72 MG/DL (ref 0–100)
LDLC/HDLC SERPL: 1.34 {RATIO}
PHOSPHATE SERPL-MCNC: 3 MG/DL (ref 2.5–4.5)
POTASSIUM SERPL-SCNC: 4.5 MMOL/L (ref 3.5–5.2)
PROT SERPL-MCNC: 6.9 G/DL (ref 6–8.5)
SODIUM SERPL-SCNC: 131 MMOL/L (ref 136–145)
TRIGL SERPL-MCNC: 90 MG/DL (ref 0–150)
VLDLC SERPL-MCNC: 17 MG/DL (ref 5–40)

## 2023-01-05 NOTE — TELEPHONE ENCOUNTER
GFR remains low.  Please avoid dehydration.  Please avoid Advil or Aleve.  Sodium remains mildly decreased.    Monocytes are elevated.  Monocytes are type of white cell.  Please repeat blood count in about a month.  Orders will be in the computer.

## 2023-02-20 DIAGNOSIS — E78.5 HYPERLIPIDEMIA, UNSPECIFIED HYPERLIPIDEMIA TYPE: ICD-10-CM

## 2023-02-20 RX ORDER — ATORVASTATIN CALCIUM 80 MG/1
TABLET, FILM COATED ORAL
Qty: 45 TABLET | Refills: 1 | OUTPATIENT
Start: 2023-02-20

## 2023-03-10 ENCOUNTER — LAB (OUTPATIENT)
Dept: INTERNAL MEDICINE | Facility: CLINIC | Age: 83
End: 2023-03-10
Payer: MEDICARE

## 2023-03-10 ENCOUNTER — TELEPHONE (OUTPATIENT)
Dept: INTERNAL MEDICINE | Facility: CLINIC | Age: 83
End: 2023-03-10
Payer: MEDICARE

## 2023-03-10 DIAGNOSIS — D72.828 HIGH BLOOD MONOCYTE COUNT: ICD-10-CM

## 2023-03-10 LAB
BASOPHILS # BLD AUTO: 0.02 10*3/MM3 (ref 0–0.2)
BASOPHILS NFR BLD AUTO: 0.3 % (ref 0–1.5)
DEPRECATED RDW RBC AUTO: 38.1 FL (ref 37–54)
EOSINOPHIL # BLD AUTO: 0.09 10*3/MM3 (ref 0–0.4)
EOSINOPHIL NFR BLD AUTO: 1.4 % (ref 0.3–6.2)
ERYTHROCYTE [DISTWIDTH] IN BLOOD BY AUTOMATED COUNT: 11.9 % (ref 12.3–15.4)
HCT VFR BLD AUTO: 41.3 % (ref 34–46.6)
HGB BLD-MCNC: 14 G/DL (ref 12–15.9)
IMM GRANULOCYTES # BLD AUTO: 0.02 10*3/MM3 (ref 0–0.05)
IMM GRANULOCYTES NFR BLD AUTO: 0.3 % (ref 0–0.5)
LYMPHOCYTES # BLD AUTO: 2 10*3/MM3 (ref 0.7–3.1)
LYMPHOCYTES NFR BLD AUTO: 30.6 % (ref 19.6–45.3)
MCH RBC QN AUTO: 29.8 PG (ref 26.6–33)
MCHC RBC AUTO-ENTMCNC: 33.9 G/DL (ref 31.5–35.7)
MCV RBC AUTO: 87.9 FL (ref 79–97)
MONOCYTES # BLD AUTO: 0.84 10*3/MM3 (ref 0.1–0.9)
MONOCYTES NFR BLD AUTO: 12.8 % (ref 5–12)
NEUTROPHILS NFR BLD AUTO: 3.57 10*3/MM3 (ref 1.7–7)
NEUTROPHILS NFR BLD AUTO: 54.6 % (ref 42.7–76)
NRBC BLD AUTO-RTO: 0 /100 WBC (ref 0–0.2)
PLATELET # BLD AUTO: 172 10*3/MM3 (ref 140–450)
PMV BLD AUTO: 11.3 FL (ref 6–12)
RBC # BLD AUTO: 4.7 10*6/MM3 (ref 3.77–5.28)
WBC NRBC COR # BLD: 6.54 10*3/MM3 (ref 3.4–10.8)

## 2023-03-10 PROCEDURE — 85025 COMPLETE CBC W/AUTO DIFF WBC: CPT | Performed by: FAMILY MEDICINE

## 2023-03-10 PROCEDURE — 36415 COLL VENOUS BLD VENIPUNCTURE: CPT | Performed by: FAMILY MEDICINE

## 2023-03-11 ENCOUNTER — TELEPHONE (OUTPATIENT)
Dept: INTERNAL MEDICINE | Facility: CLINIC | Age: 83
End: 2023-03-11
Payer: MEDICARE

## 2023-03-11 DIAGNOSIS — D72.828 HIGH BLOOD MONOCYTE COUNT: Primary | ICD-10-CM

## 2023-03-17 ENCOUNTER — TELEPHONE (OUTPATIENT)
Dept: OBSTETRICS AND GYNECOLOGY | Facility: CLINIC | Age: 83
End: 2023-03-17
Payer: MEDICARE

## 2023-03-17 NOTE — TELEPHONE ENCOUNTER
The  is looking for an appointment to move this patient up because she is having some pain with the prolapse

## 2023-03-17 NOTE — TELEPHONE ENCOUNTER
DR. BEATTY     Patient seen Dr. Beatty 04/2022. She believes she has bladder prolapse and it is causing her to bleed. Her appointment is scheduled on 04/18. Please let the front staff know if she needs to be seen sooner.

## 2023-03-21 ENCOUNTER — OFFICE VISIT (OUTPATIENT)
Dept: OBSTETRICS AND GYNECOLOGY | Facility: CLINIC | Age: 83
End: 2023-03-21
Payer: MEDICARE

## 2023-03-21 VITALS
DIASTOLIC BLOOD PRESSURE: 80 MMHG | WEIGHT: 138 LBS | RESPIRATION RATE: 14 BRPM | BODY MASS INDEX: 25.65 KG/M2 | SYSTOLIC BLOOD PRESSURE: 128 MMHG

## 2023-03-21 DIAGNOSIS — N95.0 POSTMENOPAUSAL BLEEDING: Primary | ICD-10-CM

## 2023-03-21 DIAGNOSIS — N36.2 URETHRAL CARUNCLE: ICD-10-CM

## 2023-03-21 PROCEDURE — 99213 OFFICE O/P EST LOW 20 MIN: CPT | Performed by: OBSTETRICS & GYNECOLOGY

## 2023-03-21 NOTE — PROGRESS NOTES
Subjective   Chief Complaint   Patient presents with   • Vaginal Bleeding     pmb     Anabell Vera is a 83 y.o. year old .  No LMP recorded (lmp unknown). Patient is postmenopausal.  She comes today to talk about postmenopausal bleeding.  She was here in 2022.  She first noticed the bleeding about 1 month after she was seen.  She has noted bleeding if she wears a pad, crosses her legs or does Kegel's exercises.  She is having no pain.  There is been no blood in the urine.  She has hemorrhoids but has not noticed any blood in the stool.    OTHER THINGS SHE WANTS TO DISCUSS TODAY:  Nothing else    The following portions of the patient's history were reviewed and updated as appropriate:no additional history reviewed    Social History    Tobacco Use      Smoking status: Former        Packs/day: 0.25        Years: 2.00        Pack years: .5        Types: Cigarettes        Quit date:         Years since quittin.2      Smokeless tobacco: Never      Tobacco comments: 2 years only        Objective   /80   Resp 14   Wt 62.6 kg (138 lb)   LMP  (LMP Unknown)   BMI 25.65 kg/m²     General:  well developed; well nourished  no acute distress   Pelvis: Clinical staff was present for exam  External genitalia:  normal appearance of the external genitalia including Bartholin's and Gu [place]'s glands.  :  urethral meatus normal; prominent caruncle present;  Vaginal:  atrophic mucosal changes are present;  Cervix:  normal appearance.  Uterus:  normal size, shape and consistency.  Adnexa:  non palpable bilaterally.  Rectal:  digital rectal exam not performed; anus visually normal appearing.  Cystocele GRADE 3  Rectocele GRADE 1  Uterine GRADE 2     Lab Review   No data reviewed    Imaging   No data reviewed        Assessment   1.  bleeding.  This is a new finding that does need to be worked up further.  Anticipate source of bleeding to be from her urethral caruncle     Plan   1. Ultrasound needs to be  done any time that is convenient for her.   2. The importance of keeping all planned follow-up and taking all medications as prescribed was emphasized.  3. Follow up after ultrasound     No orders of the defined types were placed in this encounter.         This note was electronically signed.    Osman King M.D.  March 21, 2023    Part of this note may be an electronic transcription/translation of spoken language to printed text using the Dragon Dictation System.

## 2023-03-24 ENCOUNTER — OFFICE VISIT (OUTPATIENT)
Dept: OBSTETRICS AND GYNECOLOGY | Facility: CLINIC | Age: 83
End: 2023-03-24
Payer: MEDICARE

## 2023-03-24 VITALS — RESPIRATION RATE: 14 BRPM | WEIGHT: 141 LBS | BODY MASS INDEX: 26.21 KG/M2

## 2023-03-24 DIAGNOSIS — N95.0 POSTMENOPAUSAL BLEEDING: Primary | ICD-10-CM

## 2023-03-24 DIAGNOSIS — R93.5 ABNORMAL ULTRASOUND OF UTERUS: ICD-10-CM

## 2023-03-24 NOTE — PROGRESS NOTES
Endometrial Biopsy    Date of procedure:  3/24/2023    Procedure documentation:    The cervix was grasped anterior at the 12 o'clock position.  The cavity sounded to 7 centimeters.  An endometrial biopsy specimen was obtained and multiple passes.  The tissue was sent for permanent histopathologic evaluation.  Tenaculum was removed from the cervix with scant bleeding.    Post procedure instructions: She was instructed to call us in 1 week's time if she has not heard from us otherwise.  If there is any significant fever or excessive bleeding or pain she is to call immediately.      This note was electronically signed.    Osman King M.D.  March 24, 2023

## 2023-03-24 NOTE — PROGRESS NOTES
Subjective   Chief Complaint   Patient presents with   • Follow-up     Anabell Vera is a 83 y.o. year old  who comes to review her recent testing and discuss next steps.    OTHER THINGS SHE WANTS TO DISCUSS TODAY:  Nothing else       Objective   Resp 14   Wt 64 kg (141 lb)   LMP  (LMP Unknown)   BMI 26.21 kg/m²     Lab Review   No data reviewed    Imaging   Pelvic ultrasound images independantly reviewed - Uterus is anteverted.  The endometrium is fluid-filled.  There are focal findings posterior and anterior.  The posterior structure has what appears to be a feeder vessel       Assessment   1. Postmenopausal bleeding with focal findings.  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 Needs endometrial biopsy today and if biopsy benign proceed with hysteroscopic evaluation and removal of focal findings  2. The importance of keeping all planned follow-up and taking all medications as prescribed was emphasized.         Total time spent today with Anabell  was 20-29 minutes (level 3) - pathophysiology of her presenting problem along with plans for any diagnositc work-up and treatment.    This note was electronically signed.    Osman King M.D.  2023    Part of this note may be an electronic transcription/translation of spoken language to printed text using the Dragon Dictation System.

## 2023-03-28 LAB — REF LAB TEST METHOD: NORMAL

## 2023-03-29 ENCOUNTER — TELEPHONE (OUTPATIENT)
Dept: OBSTETRICS AND GYNECOLOGY | Facility: CLINIC | Age: 83
End: 2023-03-29
Payer: MEDICARE

## 2023-03-29 DIAGNOSIS — C54.1 ENDOMETRIAL CANCER: Primary | ICD-10-CM

## 2023-03-29 NOTE — TELEPHONE ENCOUNTER
Called patient with results of endometrial biopsy.  Explained that the biopsy did show an endometrial cancer.  Based upon the histology from the biopsy and the appearance of the uterus and tissue by transvaginal scan, I told her I think there is a high probability this was caught in a very early stage with a high probability of the cancer being limited to the uterus.  I did explain that she needs to be referred to see gynecologic oncology for ongoing consultation and management with the expectation being she would need to have hysterectomy along with removal of fallopian tubes and ovaries and probable lymph node sampling.  Adjuvant treatment would depend upon final pathology including depth of spread of the cancer into the myometrium as well as the histologic grade in the presence of extrauterine disease.  She did seem to grasp this information.  I told her to expect a phone call from the office of Dr. Lindsay Kingston shortly to discuss treatment plans.  She was thankful for what we have done so far.  I told her to call anytime she has a question.

## 2023-04-05 NOTE — PROGRESS NOTES
"     New Patient Office Visit      Patient Name: Anabell Vera  : 1940   MRN: [id number]     Referring Physician: Osman King MD    Chief Complaint:  Postmenopausal bleeding, endometrial cancer per EMB    History of Present Illness: Anabell Vera is a 83 y.o.  postmenopausal female who is here today as a consultation with gynecologic oncology for reasons as stated above. She first saw Dr. King in  for complaints of prolapse being expectantly managed. At that time, she did not mention her bleeding as she attributed this to a \"bladder abrasion\". However, over the subseqent year she continued to have vaginal bleeding. She finally mentioned this to Dr. King last month who obtained a TVUS that showed thickened endometrium and an EMB that showed cancer. She was referred here for management    Oncology/Hematology History   Endometrial cancer   3/29/2023 Initial Diagnosis    Endometrial cancer  Referred by Dr. 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 MMR intact  TVUS with uterus measuring 4.9x3.1x4.6 cm with EMS 13.0 mm. Normal appearing ovaries.          Past Medical History:   Past Medical History:   Diagnosis Date   • Acquired female bladder prolapse 2022   • Diverticulosis of colon    • Eczema 2018   • Endometrial cancer 2023   • Esophageal reflux    • Female bladder prolapse 2022   • GERD (gastroesophageal reflux disease)    • Hematuria, microscopic 2012    W/u by urology was (-)   • Hiatal hernia    • High cholesterol     Started Rx after TIA   • Hypertension    • Hyperthyroidism 1985    Resolved w/o Tx after ~ 6 months   • Osteopenia    • PFO (patent foramen ovale) 10/2016    per echo   • Positive TB test    • Renal oncocytoma of right kidney 2018   • Right kidney mass     found by Dr Cameron Hall   • Single kidney 2018   • TIA (transient ischemic attack) 10/2016       Past " Surgical History:   Past Surgical History:   Procedure Laterality Date   • BASAL CELL CARCINOMA EXCISION  2006    left temple   • CHOLECYSTECTOMY  2012   • COLONOSCOPY  10/10/2019    Dr Mattson, 5 yr repeat family hx colon ca   • LAPAROSCOPIC CHOLECYSTECTOMY  2012   • LAPAROSCOPIC NEPHRECTOMY Right 2018    Oncocytoma   • WISDOM TOOTH EXTRACTION         Family History:   Family History   Problem Relation Age of Onset   • Cancer Father    • Colon cancer Father    • Pneumonia Father    • Arthritis Mother    • Diabetes Mother    • Heart failure Mother    • Diabetes Brother    • Heart attack Brother    • Cancer Brother    • Colon polyps Brother    • Prostate cancer Brother    • Cancer Brother         on lips   • Diabetes Sister    • Heart attack Sister    • Multiple sclerosis Sister    • Diabetes Son    • Diabetes Maternal Grandmother    • Cervical cancer Maternal Grandmother    • Breast cancer Other    • Ovarian cancer Neg Hx        Social History:   Social History     Socioeconomic History   • Marital status:    Tobacco Use   • Smoking status: Former     Packs/day: 0.25     Years: 2.00     Pack years: 0.50     Types: Cigarettes     Quit date:      Years since quittin.3   • Smokeless tobacco: Never   • Tobacco comments:     2 years only   Substance and Sexual Activity   • Alcohol use: Yes     Comment: rarely drinks wine   • Drug use: No   • Sexual activity: Defer       Past OB/GYN History:   OB History    Para Term  AB Living   4 4 4     4   SAB IAB Ectopic Molar Multiple Live Births             4      # Outcome Date GA Lbr Giovanny/2nd Weight Sex Delivery Anes PTL Lv   4 Term 68    M Vag-Spont   OMAR   3 Term 67    F Vag-Spont   OMAR   2 Term 66    M Vag-Spont   OMAR   1 Term 65    M Vag-Spont   OMAR      Obstetric Comments    - Ryan    - Niranjan    - Tabatha    - Danielle        Health Maintenance:   Mammogram: Date: 2022 Results: BI-RADS  "1  Colonoscopy: Date: 10/2019. Per Dr. Mattson, patient to repeat in 5 years given family hx of colon cancer.    Medications:     Current Outpatient Medications:   •  atorvastatin (LIPITOR) 80 MG tablet, Take 0.5 tablets by mouth every night at bedtime., Disp: 45 tablet, Rfl: 1  •  Calcium-Magnesium-Vitamin D 600-300-400 liquid, Take  by mouth. 2 tbsp daily, Disp: , Rfl:   •  clopidogrel (PLAVIX) 75 MG tablet, Take 1 tablet by mouth Daily., Disp: 90 tablet, Rfl: 1  •  fluticasone (FLONASE) 50 MCG/ACT nasal spray, 2 sprays into the nostril(s) as directed by provider Daily., Disp: , Rfl:   •  hydrocortisone 2.5 % cream, PRN, Disp: , Rfl:   •  Multiple Vitamins-Minerals (MULTI VITAMIN/MINERALS PO), Take  by mouth Daily. Takes every other day, Disp: , Rfl:   •  ramipril (ALTACE) 10 MG capsule, Take 1 capsule by mouth Daily., Disp: 90 capsule, Rfl: 1    Allergies:   Allergies   Allergen Reactions   • Wheat Rash   • Amoxicillin Rash   • Penicillins Rash       Review of Systems:   See as stated above in HPI.    Objective     Physical Exam:  Vital Signs:   Vitals:    04/06/23 1113   BP: 156/77   Pulse: 78   Resp: 18   Temp: 97.5 °F (36.4 °C)   TempSrc: Temporal   SpO2: 99%   Weight: 61.4 kg (135 lb 4.8 oz)   Height: 157.5 cm (62\")   PainSc: 0-No pain     BMI: Body mass index is 24.75 kg/m².   ECOG score: 0           PHQ-2 Depression Screening  Little interest or pleasure in doing things?     Feeling down, depressed, or hopeless?     PHQ-2 Total Score       Physical Exam   General appearance - alert, well appearing, and in no distress and oriented to person, place, and time  Mental status - normal mood, behavior, speech, dress, motor activity, and thought processes  Eyes - sclera anicteric, left eye normal, right eye normal  Ears - right ear normal, left ear normal  Lymphatics - no palpable lymphadenopathy, no hepatosplenomegaly  Lungs - normal respiratory effort, clear to auscultation  Heart - normal rate, regular rhythm, " normal S1, S2, no murmurs, rubs, clicks or gallops  Abdomen - soft, nontender, nondistended, no masses or organomegaly  Pelvic - vaginal and cervix normal, without discharge or bleeding. Cervix small, consistent with postmenopausal atrophy. No cervical motion tenderness or adnexal fullness or adnexal masses palpable.  Neurological - alert, oriented, normal speech, no focal findings or movement disorder noted  Musculoskeletal - no joint tenderness, deformity or swelling  Extremities - peripheral pulses normal, no pedal edema, no clubbing or cyanosis  Skin - normal coloration and turgor, no rashes, no suspicious skin lesions noted    Labs:  Lab Results   Component Value Date    WBC 6.54 03/10/2023    HGB 14.0 03/10/2023    HCT 41.3 03/10/2023    MCV 87.9 03/10/2023     03/10/2023     Lab Results   Component Value Date    GLUCOSE 95 01/04/2023    BUN 20 01/04/2023    CREATININE 1.15 (H) 01/04/2023    EGFR 48.0 (L) 01/04/2023    BCR 17.4 01/04/2023    K 4.5 01/04/2023    CO2 27.0 01/04/2023    CALCIUM 9.7 01/04/2023    ALBUMIN 4.9 01/04/2023    BILITOT 0.5 01/04/2023    AST 19 01/04/2023    ALT 14 01/04/2023     Pathology  3/24/2023: EMB with 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 MMR intact    Imaging:  3/24/2023: TVUS with uterus measuring 4.9x3.1x4.6 cm with EMS 13.0 mm. Normal appearing ovaries.     Assessment / Plan    Anabell Vera is a 83 y.o. female who was recently diagnosed with FIGO grade 1 endometrial cancer. We discussed surgical management via robotic hysterectomy, bilateral salpingo-oophorectomy with sentinel lymph node evaluation +/- complete lymphadenectomy as indicated. The patient was also counseled extensively on the nature of endometrial cancer and the fact that a majority are found in the early stages. However, if more advanced disease is encountered, she may require chemotherapy, radiation or a combination of the two. We also discussed risks of the  procedure including bleeding, infection, wound breakdown, blood clots, anesthesia complications, injury to surrounding organs including the bowel, bladder, blood vessels, nerves, and ureters requiring additional intervention or delayed procedures. We also discussed the risk of development of lymphedema particularly if a complete lymphadenectomy is needed and the need for a laparotomy if the surgery cannot be performed safely via the minimally invasive approach. We also discussed the anticipated hospital stay and recovery time. All of the patient's questions were answered satisfactorily and verbal consent was obtained.    Patient lives alone and completes her ADLs independently. She drives and can complete at least 3 METs in activity. Her family plans to be present for her immediate postoperative recovery.    History of TIA with PFO: Would like her to hold plavix for 5 days if possible. Can restart perioperatively. PAT ordered. Will reach out to patient's PCP for optimization.   HTN: Asymptomatic. Will need to hold ramipril on day of surgery. Plan for EKG preoperatively.     Pain assessment was performed today as a part of patient’s care.  For patients with pain related to surgery, gynecologic malignancy or cancer treatment, the plan is as noted in the assessment/plan.  For patients with pain not related to these issues, they are to seek any further needed care from a more appropriate provider, such as PCP.      Diagnoses and all orders for this visit:    1. Endometrial cancer (Primary)  -     Case Request; Standing  -     Type and screen; Future  -     ECG 12 Lead; Future  -     XR chest 1 vw; Future  -     CBC & Differential; Future  -     Comprehensive Metabolic Panel; Future  -     Case Request    2. Primary hypertension    3. Hx of transient ischemic attack (TIA)    4. PFO (patent foramen ovale)    Other orders  -     Follow Anesthesia Guidelines / Protocol; Future  -     Obtain Informed Consent; Future  -      Provide NPO Instructions to Patient; Future  -     Chlorhexidine Skin Prep; Future  -     Follow Anesthesia Guidelines / Protocol; Standing  -     Obtain Informed Consent; Standing  -     Verify / Perform Chlorhexidine Skin Prep; Standing  -     Verify / Perform Chlorhexidine Skin Prep if Indicated (If Not Already Completed); Standing  -     lactated ringers infusion  -     ceFAZolin (ANCEF) 2 g in sodium chloride 0.9 % 100 mL IVPB  -     Insert Peripheral IV; Standing  -     Saline Lock & Maintain IV Access; Standing  -     sodium chloride 0.9 % flush 3 mL  -     sodium chloride 0.9 % flush 10 mL  -     sodium chloride 0.9 % infusion 40 mL  -     gabapentin (NEURONTIN) capsule 300 mg  -     heparin (porcine) 5000 UNIT/ML injection 5,000 Units  -     oxyCODONE (ROXICODONE) immediate release tablet 5 mg  -     acetaminophen (TYLENOL) tablet 975 mg      Follow Up: Surgery, 5/9/23.    Paul Johnson MD   Resident Physician, PGY 3  Gynecologic Oncology     Patient was seen and examined with Dr. Johnson,  resident, who performed portions of the examination and documentation for this patient's care under my direct supervision.  I agree with the above documentation and plan.    Lindsay Kingston MD  Gynecologic Oncology

## 2023-04-06 ENCOUNTER — OFFICE VISIT (OUTPATIENT)
Dept: GYNECOLOGIC ONCOLOGY | Facility: CLINIC | Age: 83
End: 2023-04-06
Payer: MEDICARE

## 2023-04-06 VITALS
RESPIRATION RATE: 18 BRPM | DIASTOLIC BLOOD PRESSURE: 77 MMHG | WEIGHT: 135.3 LBS | HEIGHT: 62 IN | BODY MASS INDEX: 24.9 KG/M2 | OXYGEN SATURATION: 99 % | HEART RATE: 78 BPM | SYSTOLIC BLOOD PRESSURE: 156 MMHG | TEMPERATURE: 97.5 F

## 2023-04-06 DIAGNOSIS — C54.1 ENDOMETRIAL CANCER: Primary | ICD-10-CM

## 2023-04-06 DIAGNOSIS — I10 PRIMARY HYPERTENSION: ICD-10-CM

## 2023-04-06 DIAGNOSIS — Z86.73 HX OF TRANSIENT ISCHEMIC ATTACK (TIA): ICD-10-CM

## 2023-04-06 DIAGNOSIS — Q21.12 PFO (PATENT FORAMEN OVALE): ICD-10-CM

## 2023-04-06 PROCEDURE — 1159F MED LIST DOCD IN RCRD: CPT | Performed by: OBSTETRICS & GYNECOLOGY

## 2023-04-06 PROCEDURE — 1126F AMNT PAIN NOTED NONE PRSNT: CPT | Performed by: OBSTETRICS & GYNECOLOGY

## 2023-04-06 PROCEDURE — 99205 OFFICE O/P NEW HI 60 MIN: CPT | Performed by: OBSTETRICS & GYNECOLOGY

## 2023-04-06 PROCEDURE — 1160F RVW MEDS BY RX/DR IN RCRD: CPT | Performed by: OBSTETRICS & GYNECOLOGY

## 2023-04-06 RX ORDER — SODIUM CHLORIDE 9 MG/ML
40 INJECTION, SOLUTION INTRAVENOUS AS NEEDED
OUTPATIENT
Start: 2023-04-06

## 2023-04-06 RX ORDER — SODIUM CHLORIDE 0.9 % (FLUSH) 0.9 %
3 SYRINGE (ML) INJECTION EVERY 12 HOURS SCHEDULED
OUTPATIENT
Start: 2023-04-06

## 2023-04-06 RX ORDER — SODIUM CHLORIDE, SODIUM LACTATE, POTASSIUM CHLORIDE, CALCIUM CHLORIDE 600; 310; 30; 20 MG/100ML; MG/100ML; MG/100ML; MG/100ML
100 INJECTION, SOLUTION INTRAVENOUS CONTINUOUS
OUTPATIENT
Start: 2023-04-06

## 2023-04-06 RX ORDER — GABAPENTIN 100 MG/1
300 CAPSULE ORAL ONCE
OUTPATIENT
Start: 2023-04-06 | End: 2023-04-06

## 2023-04-06 RX ORDER — HEPARIN SODIUM 5000 [USP'U]/ML
5000 INJECTION, SOLUTION INTRAVENOUS; SUBCUTANEOUS ONCE
OUTPATIENT
Start: 2023-04-06 | End: 2023-04-06

## 2023-04-06 RX ORDER — ACETAMINOPHEN 325 MG/1
1000 TABLET ORAL ONCE
OUTPATIENT
Start: 2023-04-06 | End: 2023-04-06

## 2023-04-06 RX ORDER — SODIUM CHLORIDE 0.9 % (FLUSH) 0.9 %
10 SYRINGE (ML) INJECTION AS NEEDED
OUTPATIENT
Start: 2023-04-06

## 2023-04-06 RX ORDER — OXYCODONE HYDROCHLORIDE 5 MG/1
5 TABLET ORAL ONCE
OUTPATIENT
Start: 2023-04-06 | End: 2023-04-06

## 2023-04-06 NOTE — LETTER
2023     Osman King MD  [address] [facility] [address]  [facility] [address]    Patient: Anabell Vera   YOB: 1940   Date of Visit: 2023       Dear Osman King MD,    Thank you for referring Anabell Vera to me for evaluation. Below is a copy of my consult note.    If you have questions, please do not hesitate to call me. I look forward to following Anabell along with you.         Sincerely,        Lindsay Kingston MD        CC: Alayna PEREZ MD         New Patient Office Visit      Patient Name: Anabell Vera  : 1940   MRN: [id number]     Referring Physician: Osman King MD    Chief Complaint:  Postmenopausal bleeding, endometrial cancer per EMB    History of Present Illness: Anabell Vera is a 83 y.o.  postmenopausal female who is here today as a consultation with gynecologic oncology for reasons as stated above. She states that PMB had initial onset in May 2022. She initially thought bladder abrasion was resulting in bleeding, because she has known bladder prolapse (expectantly managed as patient had not been interested in pessary). She has been wearing pads. She denies any pelvic pain, hematuria, or hematochezia. She underwent workup with Dr. King that demonstrated the aforementioned diagnosis.    Oncology/Hematology History   Endometrial cancer   3/29/2023 Initial Diagnosis    Endometrial cancer  Referred by Dr. 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 MMR intact  TVUS with uterus measuring 4.9x3.1x4.6 cm with EMS 13.0 mm. Normal appearing ovaries.          Past Medical History:   Past Medical History:   Diagnosis Date   • Acquired female bladder prolapse 2022   • Diverticulosis of colon    • Eczema 2018   • Endometrial cancer 2023   • Esophageal reflux    • Female bladder prolapse 2022   • GERD (gastroesophageal reflux disease)    • Hematuria, microscopic 2012     W/u by urology was (-)   • Hiatal hernia    • High cholesterol     Started Rx after TIA   • Hypertension    • Hyperthyroidism     Resolved w/o Tx after ~ 6 months   • Osteopenia    • PFO (patent foramen ovale) 10/2016    per echo   • Positive TB test    • Renal oncocytoma of right kidney 2018   • Right kidney mass 2016    found by Dr Cameron Hall   • Single kidney 2018   • TIA (transient ischemic attack) 10/2016       Past Surgical History:   Past Surgical History:   Procedure Laterality Date   • BASAL CELL CARCINOMA EXCISION      left temple   • CHOLECYSTECTOMY  2012   • COLONOSCOPY  10/10/2019    Dr Mattson, 5 yr repeat family hx colon ca   • LAPAROSCOPIC CHOLECYSTECTOMY  2012   • LAPAROSCOPIC NEPHRECTOMY Right 2018    Oncocytoma   • WISDOM TOOTH EXTRACTION         Family History:   Family History   Problem Relation Age of Onset   • Cancer Father    • Colon cancer Father    • Pneumonia Father    • Arthritis Mother    • Diabetes Mother    • Heart failure Mother    • Diabetes Brother    • Heart attack Brother    • Cancer Brother    • Colon polyps Brother    • Prostate cancer Brother    • Cancer Brother         on lips   • Diabetes Sister    • Heart attack Sister    • Multiple sclerosis Sister    • Diabetes Son    • Diabetes Maternal Grandmother    • Cervical cancer Maternal Grandmother    • Breast cancer Other    • Ovarian cancer Neg Hx        Social History:   Social History     Socioeconomic History   • Marital status:    Tobacco Use   • Smoking status: Former     Packs/day: 0.25     Years: 2.00     Pack years: 0.50     Types: Cigarettes     Quit date: 1964     Years since quittin.3   • Smokeless tobacco: Never   • Tobacco comments:     2 years only   Substance and Sexual Activity   • Alcohol use: Yes     Comment: rarely drinks wine   • Drug use: No   • Sexual activity: Defer       Past OB/GYN History:   OB History    Para Term  AB Living   4 4 4  "    4   SAB IAB Ectopic Molar Multiple Live Births             4      # Outcome Date GA Lbr Giovanny/2nd Weight Sex Delivery Anes PTL Lv   4 Term 12/09/68    M Vag-Spont   OMAR   3 Term 09/14/67    F Vag-Spont   OMAR   2 Term 11/13/65    M Vag-Spont   OMAR   1 Term 12/07/64    M Vag-Spont   OMAR      Obstetric Comments   1964 - Ryan   1965 - Niranjan   1967 - Tabatha   1968 - Danielle        Health Maintenance:   Mammogram: Date: 09/2022 Results: BI-RADS 1  Colonoscopy: Date: 10/2019. Per Dr. Mattson, patient to repeat in 5 years given family hx of colon cancer.    Medications:     Current Outpatient Medications:   •  atorvastatin (LIPITOR) 80 MG tablet, Take 0.5 tablets by mouth every night at bedtime., Disp: 45 tablet, Rfl: 1  •  Calcium-Magnesium-Vitamin D 600-300-400 liquid, Take  by mouth. 2 tbsp daily, Disp: , Rfl:   •  clopidogrel (PLAVIX) 75 MG tablet, Take 1 tablet by mouth Daily., Disp: 90 tablet, Rfl: 1  •  fluticasone (FLONASE) 50 MCG/ACT nasal spray, 2 sprays into the nostril(s) as directed by provider Daily., Disp: , Rfl:   •  hydrocortisone 2.5 % cream, PRN, Disp: , Rfl:   •  Multiple Vitamins-Minerals (MULTI VITAMIN/MINERALS PO), Take  by mouth Daily. Takes every other day, Disp: , Rfl:   •  ramipril (ALTACE) 10 MG capsule, Take 1 capsule by mouth Daily., Disp: 90 capsule, Rfl: 1    Allergies:   Allergies   Allergen Reactions   • Wheat Rash   • Amoxicillin Rash   • Penicillins Rash       Review of Systems:   See as stated above in HPI.    Objective     Physical Exam:  Vital Signs:   Vitals:    04/06/23 1113   BP: 156/77   Pulse: 78   Resp: 18   Temp: 97.5 °F (36.4 °C)   TempSrc: Temporal   SpO2: 99%   Weight: 61.4 kg (135 lb 4.8 oz)   Height: 157.5 cm (62\")   PainSc: 0-No pain     BMI: Body mass index is 24.75 kg/m².   ECOG score: 0           PHQ-2 Depression Screening  Little interest or pleasure in doing things?     Feeling down, depressed, or hopeless?     PHQ-2 Total Score       Physical Exam   General appearance " - alert, well appearing, and in no distress and oriented to person, place, and time  Mental status - normal mood, behavior, speech, dress, motor activity, and thought processes  Eyes - sclera anicteric, left eye normal, right eye normal  Ears - right ear normal, left ear normal  Lymphatics - no palpable lymphadenopathy, no hepatosplenomegaly  Lungs - normal respiratory effort, clear to auscultation  Heart - normal rate, regular rhythm, normal S1, S2, no murmurs, rubs, clicks or gallops  Abdomen - soft, nontender, nondistended, no masses or organomegaly  Pelvic - vaginal and cervix normal, without discharge or bleeding. Cervix small, consistent with postmenopausal atrophy. No cervical motion tenderness or adnexal fullness or adnexal masses palpable.  Neurological - alert, oriented, normal speech, no focal findings or movement disorder noted  Musculoskeletal - no joint tenderness, deformity or swelling  Extremities - peripheral pulses normal, no pedal edema, no clubbing or cyanosis  Skin - normal coloration and turgor, no rashes, no suspicious skin lesions noted    Labs:  Lab Results   Component Value Date    WBC 6.54 03/10/2023    HGB 14.0 03/10/2023    HCT 41.3 03/10/2023    MCV 87.9 03/10/2023     03/10/2023     Lab Results   Component Value Date    GLUCOSE 95 01/04/2023    BUN 20 01/04/2023    CREATININE 1.15 (H) 01/04/2023    EGFR 48.0 (L) 01/04/2023    BCR 17.4 01/04/2023    K 4.5 01/04/2023    CO2 27.0 01/04/2023    CALCIUM 9.7 01/04/2023    ALBUMIN 4.9 01/04/2023    BILITOT 0.5 01/04/2023    AST 19 01/04/2023    ALT 14 01/04/2023     Pathology  3/24/2023: EMB with 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 MMR intact    Imaging:  3/24/2023: TVUS with uterus measuring 4.9x3.1x4.6 cm with EMS 13.0 mm. Normal appearing ovaries.     Assessment / Plan    Anabell Vera is a 83 y.o. female who was recently diagnosed with FIGO grade 1 endometrial cancer. We discussed surgical  management via robotic hysterectomy, bilateral salpingo-oophorectomy with sentinel lymph node evaluation +/- complete lymphadenectomy as indicated. The patient was also counseled extensively on the nature of endometrial cancer and the fact that a majority are found in the early stages. However, if more advanced disease is encountered, she may require chemotherapy, radiation or a combination of the two. We also discussed risks of the procedure including bleeding, infection, wound breakdown, blood clots, anesthesia complications, injury to surrounding organs including the bowel, bladder, blood vessels, nerves, and ureters requiring additional intervention or delayed procedures. We also discussed the risk of development of lymphedema particularly if a complete lymphadenectomy is needed and the need for a laparotomy if the surgery cannot be performed safely via the minimally invasive approach. We also discussed the anticipated hospital stay and recovery time. All of the patient's questions were answered satisfactorily and verbal consent was obtained.    Patient lives alone and completes her ADLs independently. She drives and can complete at least 3 METs in activity. Her family plans to be present for her immediate postoperative recovery.    History of TIA with PFO: Would like her to hold plavix for 5 days if possible. Can restart perioperatively. PAT ordered. Will reach out to patient's PCP for optimization.   HTN: Asymptomatic. Will need to hold ramipril on day of surgery. Plan for EKG preoperatively.     Pain assessment was performed today as a part of patient’s care.  For patients with pain related to surgery, gynecologic malignancy or cancer treatment, the plan is as noted in the assessment/plan.  For patients with pain not related to these issues, they are to seek any further needed care from a more appropriate provider, such as PCP.      Diagnoses and all orders for this visit:    1. Endometrial cancer  (Primary)  -     Case Request; Standing  -     Type and screen; Future  -     ECG 12 Lead; Future  -     XR chest 1 vw; Future  -     CBC & Differential; Future  -     Comprehensive Metabolic Panel; Future  -     Case Request    2. Primary hypertension    3. Hx of transient ischemic attack (TIA)    4. PFO (patent foramen ovale)    Other orders  -     Follow Anesthesia Guidelines / Protocol; Future  -     Obtain Informed Consent; Future  -     Provide NPO Instructions to Patient; Future  -     Chlorhexidine Skin Prep; Future  -     Follow Anesthesia Guidelines / Protocol; Standing  -     Obtain Informed Consent; Standing  -     Verify / Perform Chlorhexidine Skin Prep; Standing  -     Verify / Perform Chlorhexidine Skin Prep if Indicated (If Not Already Completed); Standing  -     lactated ringers infusion  -     ceFAZolin (ANCEF) 2 g in sodium chloride 0.9 % 100 mL IVPB  -     Insert Peripheral IV; Standing  -     Saline Lock & Maintain IV Access; Standing  -     sodium chloride 0.9 % flush 3 mL  -     sodium chloride 0.9 % flush 10 mL  -     sodium chloride 0.9 % infusion 40 mL  -     gabapentin (NEURONTIN) capsule 300 mg  -     heparin (porcine) 5000 UNIT/ML injection 5,000 Units  -     oxyCODONE (ROXICODONE) immediate release tablet 5 mg  -     acetaminophen (TYLENOL) tablet 975 mg      Follow Up: Surgery, 5/9/23.    Paul Johnson MD   Resident Physician, PGY 3  Gynecologic Oncology     Patient was seen and examined with Dr. Johnson,  resident, who performed portions of the examination and documentation for this patient's care under my direct supervision.  I agree with the above documentation and plan.    Lindsay Kingston MD  Gynecologic Oncology

## 2023-04-10 ENCOUNTER — TELEPHONE (OUTPATIENT)
Dept: INTERNAL MEDICINE | Facility: CLINIC | Age: 83
End: 2023-04-10
Payer: MEDICARE

## 2023-04-10 ENCOUNTER — TELEPHONE (OUTPATIENT)
Dept: GYNECOLOGIC ONCOLOGY | Facility: CLINIC | Age: 83
End: 2023-04-10
Payer: MEDICARE

## 2023-04-10 NOTE — TELEPHONE ENCOUNTER
RN contacted Dr. Toscano's office staff about a mutual patient Anabell Vera to see if she was cleared to have a hysterectomy on 5/9 and if it was okay to stop her Plavix 5 days before the procedure.     Office staff told RN that Dr. Toscano is out until Wednesday, but they would send her a message and get back to us when she is back in office.

## 2023-04-10 NOTE — TELEPHONE ENCOUNTER
Provider called to see if patient could be cleared for hysterectomy surgery on May 9th or if patient would need an appointment with provider prior to clear for surgery.   Provider is also asking for patient to hold Plavix medication for 5 days prior to procedure.

## 2023-04-11 NOTE — TELEPHONE ENCOUNTER
Susan has been notified that pt will need to schedule a Pre-Op appointment.  I let her know that I would contact pt to let her know.  [employer] message has been sent to the patient

## 2023-04-11 NOTE — TELEPHONE ENCOUNTER
RN received call from CHRISS Kelly from Dr. Toscano's office. Leticia informed RN that Anabell would need to be seen by Dr. Toscano before her procedure in order to get cleared for surgery, and their office would reach out to make an appointment.

## 2023-04-12 ENCOUNTER — TELEPHONE (OUTPATIENT)
Dept: GYNECOLOGIC ONCOLOGY | Facility: CLINIC | Age: 83
End: 2023-04-12
Payer: MEDICARE

## 2023-04-12 NOTE — TELEPHONE ENCOUNTER
Received FMLA forms 04/10/2023 via fax for daughter Tabatha Brasher to care for mother (Patient) post operatively.  Paper work completed and faxed to [employer]# [phone] with success on Fax call report.  Paperwork placed in scan folder to be scanned into patient chart and Tabatha Brasher notified via phone call of progress and disposition of paperwork.

## 2023-04-14 ENCOUNTER — OFFICE VISIT (OUTPATIENT)
Dept: INTERNAL MEDICINE | Facility: CLINIC | Age: 83
End: 2023-04-14
Payer: MEDICARE

## 2023-04-14 VITALS
DIASTOLIC BLOOD PRESSURE: 72 MMHG | BODY MASS INDEX: 25.1 KG/M2 | TEMPERATURE: 98 F | HEART RATE: 68 BPM | HEIGHT: 62 IN | RESPIRATION RATE: 20 BRPM | OXYGEN SATURATION: 97 % | WEIGHT: 136.4 LBS | SYSTOLIC BLOOD PRESSURE: 132 MMHG

## 2023-04-14 DIAGNOSIS — Z79.01 ANTICOAGULANT LONG-TERM USE: ICD-10-CM

## 2023-04-14 DIAGNOSIS — C54.1 ENDOMETRIAL CANCER: Primary | ICD-10-CM

## 2023-04-14 DIAGNOSIS — I10 PRIMARY HYPERTENSION: ICD-10-CM

## 2023-04-14 DIAGNOSIS — E78.2 MIXED HYPERLIPIDEMIA: ICD-10-CM

## 2023-04-14 DIAGNOSIS — Q21.12 PFO (PATENT FORAMEN OVALE): ICD-10-CM

## 2023-04-14 NOTE — PROGRESS NOTES
Chief Complaint  Pre-op Exam (Hysterectomy )    Subjective          History of Present Illness  Anabell Vera presents to [facility] for surgical pre-op exam and consultation for hysterectomy.     No personal or family history of problems with anesthesia.   She is currently on clopidogrel and aspirin for history of CVA (10/2016) in the setting of PFO and HTN. She also reports a PMH significant for carotid stenosis (duplex 10/2016 notes both right and left internal carotid artery stenosis of 0-49%).   Patient does not have objections to receiving blood products if needed.  Patient can walk up 2 flights of stairs or walk 2 blocks without CP or SOA.     History of Present Illness    Endometrial CA:  Was seen for postmenopausal bleeding and they found a polyp, took a tissue sample, and it showed endometrial cancer, she is being scheduled for hysterectomy.     GYN has ordered labs, cxr, and ekg to be done a week prior to her appt.  Hx of TIA 2016 and was started on plavix. She had previously seen cardio to manage anticoagulation with previous kidney removal surgery, she stopped plavix for 7d at that time. Has hx of PFO as well.     HLD:  On Lipitor 80mg  Lab Results       Component                Value               Date                       CHOL                     142                 01/04/2023                    TRIG                     90                  01/04/2023                 HDL                      53                  01/04/2023                 LDL                      72                  01/04/2023              HTN:  Doing well on ramipril 10mg  No CP/SOA, edema, dizziness, diaphoresis, HA, vision changes. Taking meds as directed.     GERD:  occ has some heartburn when she lays down at night, takes tums and that helps her sx.     AR:  Takes flonase prn    No recent illness, no fevers. No new complaints.            The following portions of the patient's history were reviewed  and updated as appropriate: allergies, current medications, past family history, past medical history, past social history, past surgical history and problem list.  Allergies   Allergen Reactions   • Wheat Rash   • Amoxicillin Rash   • Penicillins Rash       Current Outpatient Medications:   •  atorvastatin (LIPITOR) 80 MG tablet, Take 0.5 tablets by mouth every night at bedtime., Disp: 45 tablet, Rfl: 1  •  Calcium-Magnesium-Vitamin D 600-300-400 liquid, Take  by mouth. 2 tbsp daily, Disp: , Rfl:   •  clopidogrel (PLAVIX) 75 MG tablet, Take 1 tablet by mouth Daily., Disp: 90 tablet, Rfl: 1  •  fluticasone (FLONASE) 50 MCG/ACT nasal spray, 2 sprays into the nostril(s) as directed by provider Daily., Disp: , Rfl:   •  hydrocortisone 2.5 % cream, PRN, Disp: , Rfl:   •  Multiple Vitamins-Minerals (MULTI VITAMIN/MINERALS PO), Take  by mouth Daily. Takes every other day, Disp: , Rfl:   •  ramipril (ALTACE) 10 MG capsule, Take 1 capsule by mouth Daily., Disp: 90 capsule, Rfl: 1  No orders of the defined types were placed in this encounter.    Past Medical History:   Diagnosis Date   • Acquired female bladder prolapse 01/2022   • Diverticulosis of colon    • Eczema 03/21/2018   • Endometrial cancer 03/29/2023   • Esophageal reflux    • Female bladder prolapse 01/2022   • GERD (gastroesophageal reflux disease)    • Hematuria, microscopic 2012    W/u by urology was (-)   • Hiatal hernia    • High cholesterol 2016    Started Rx after TIA   • Hypertension 2016   • Hyperthyroidism 1985    Resolved w/o Tx after ~ 6 months   • Osteopenia    • PFO (patent foramen ovale) 10/2016    per echo   • Positive TB test    • 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   • Right kidney mass 2016    found by Dr Cameron Hall   • Single kidney 01/26/2018   • TIA (transient ischemic attack) 10/2016       Past Surgical History:   Procedure Laterality Date   • BASAL CELL CARCINOMA EXCISION  2006    left temple   • CHOLECYSTECTOMY  12/2012   •  "COLONOSCOPY  10/10/2019    Dr Mattson, 5 yr repeat family hx colon ca   • LAPAROSCOPIC CHOLECYSTECTOMY  2012   • LAPAROSCOPIC NEPHRECTOMY Right 2018    Oncocytoma   • WISDOM TOOTH EXTRACTION         Social History     Socioeconomic History   • Marital status:    Tobacco Use   • Smoking status: Former     Packs/day: 0.25     Years: 2.00     Pack years: 0.50     Types: Cigarettes     Quit date:      Years since quittin.3   • Smokeless tobacco: Never   • Tobacco comments:     2 years only   Vaping Use   • Vaping Use: Never used   Substance and Sexual Activity   • Alcohol use: Yes     Comment: rarely drinks wine   • Drug use: No   • Sexual activity: Not Currently     Partners: Male     Social History     Tobacco Use   Smoking Status Former   • Packs/day: 0.25   • Years: 2.00   • Pack years: 0.50   • Types: Cigarettes   • Quit date:    • Years since quittin.3   Smokeless Tobacco Never   Tobacco Comments    2 years only        Objective   Vital Signs:   Vitals:    23 0919   BP: 132/72   Pulse: 68   Resp: 20   Temp: 98 °F (36.7 °C)   TempSrc: Temporal   SpO2: 97%   Weight: 61.9 kg (136 lb 6.4 oz)   Height: 157.5 cm (62\")   PainSc: 0-No pain      Physical Exam  Vitals reviewed.   Constitutional:       General: She is not in acute distress.     Appearance: Normal appearance.   HENT:      Head: Normocephalic and atraumatic.   Eyes:      General: No scleral icterus.     Extraocular Movements: Extraocular movements intact.      Conjunctiva/sclera: Conjunctivae normal.   Cardiovascular:      Rate and Rhythm: Normal rate and regular rhythm.      Heart sounds: Normal heart sounds. No murmur heard.  Pulmonary:      Effort: Pulmonary effort is normal. No respiratory distress.      Breath sounds: Normal breath sounds. No stridor. No wheezing or rhonchi.   Musculoskeletal:      Cervical back: Normal range of motion and neck supple.   Skin:     General: Skin is warm and dry.      Coloration: " Skin is not jaundiced.   Neurological:      General: No focal deficit present.      Mental Status: She is alert and oriented to person, place, and time.      Gait: Gait normal.   Psychiatric:         Mood and Affect: Mood normal.         Behavior: Behavior normal.        Body mass index is 24.95 kg/m².    Lab Results   Component Value Date    WBC 6.54 03/10/2023    HGB 14.0 03/10/2023    HCT 41.3 03/10/2023    MCV 87.9 03/10/2023     03/10/2023     Lab Results   Component Value Date    GLUCOSE 95 01/04/2023    BUN 20 01/04/2023    CREATININE 1.15 (H) 01/04/2023    EGFR 48.0 (L) 01/04/2023    BCR 17.4 01/04/2023    K 4.5 01/04/2023    CO2 27.0 01/04/2023    CALCIUM 9.7 01/04/2023    ALBUMIN 4.9 01/04/2023    BILITOT 0.5 01/04/2023    AST 19 01/04/2023    ALT 14 01/04/2023     Lab Results   Component Value Date    CHOL 142 01/04/2023    CHLPL 214 (H) 11/16/2015    TRIG 90 01/04/2023    HDL 53 01/04/2023    LDL 72 01/04/2023       Lab Results   Component Value Date    HGBA1C 6.20 (H) 03/30/2022         Diagnoses and all orders for this visit:    1. Endometrial cancer (Primary)  Assessment & Plan:  F/u with gyn as dir      2. Anticoagulant long-term use  Assessment & Plan:  Will discuss anticoagulation recommendations with cardiology.      3. PFO (patent foramen ovale)    4. Primary hypertension  Assessment & Plan:  Hypertension is improving with treatment.  Continue current treatment regimen.  Blood pressure will be reassessed at the next regular appointment.  Cont Ramipril 10      5. Mixed hyperlipidemia  Assessment & Plan:  Lipid abnormalities are improving with treatment.  Nutritional counseling was provided. and Pharmacotherapy as ordered.  Lipids will be reassessed in 6 months. Cont lipitor 80      She is getting labs and cxr/ekg as ordered by GYN prior to surgery, she is here today for pre op exam and management of anticoagulation. She is cleared for surgery pending labs and cxr/ekg, cardio will contact  with recommendations for holding Plavix.    83 y.o. female with planned surgery as above.    Known risk factors for perioperative complications: HTN, on long term anticoagulation      If labs or images are ordered we will contact you with the results within the next week.  If you have not heard from us after a week please call our office to inquire about the results.     Return if symptoms worsen or fail to improve, for Next scheduled follow up.    Mirtha Armendariz PA-C    * Please note that portions of this note were completed with a voice recognition program.

## 2023-04-24 NOTE — ASSESSMENT & PLAN NOTE
Hypertension is improving with treatment.  Continue current treatment regimen.  Blood pressure will be reassessed at the next regular appointment.  Cont Ramipril 10

## 2023-04-24 NOTE — ASSESSMENT & PLAN NOTE
Lipid abnormalities are improving with treatment.  Nutritional counseling was provided. and Pharmacotherapy as ordered.  Lipids will be reassessed in 6 months. Cont lipitor 80

## 2023-04-26 ENCOUNTER — TELEPHONE (OUTPATIENT)
Dept: INTERNAL MEDICINE | Facility: CLINIC | Age: 83
End: 2023-04-26
Payer: MEDICARE

## 2023-04-26 DIAGNOSIS — C54.1 ENDOMETRIAL CANCER: ICD-10-CM

## 2023-04-26 DIAGNOSIS — Z79.01 ANTICOAGULANT LONG-TERM USE: Primary | ICD-10-CM

## 2023-04-26 DIAGNOSIS — Q21.12 PFO (PATENT FORAMEN OVALE): ICD-10-CM

## 2023-04-26 NOTE — TELEPHONE ENCOUNTER
Please call pts cardiologist Dr Collins for recommendations on anticoagulation/antiplatelet management prior to upcoming hysterectomy for endometrial cancer.  She is currently on clopidogrel and aspirin for history of CVA (10/2016) in the setting of PFO.   She has surgery scheduled for 5/9/23.

## 2023-04-27 ENCOUNTER — TELEPHONE (OUTPATIENT)
Dept: CARDIOLOGY | Facility: CLINIC | Age: 83
End: 2023-04-27
Payer: MEDICARE

## 2023-04-27 NOTE — TELEPHONE ENCOUNTER
Patient is schedule for a hysterectomy in the setting of endometrial cancer.   She is on plavix for a PFO.   Patient last seen on 2018 and was a follow up as needed then.   Will she need to come in for an appointment for cardiac clearance?    Please advice. Thank you!    1. PFO  a. Echocardiogram 10/19/16: EF 65%. Significant a patent PFO.  Mild MR.  b. 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  Positive bubble study.  c. Cardiac event monitor for almost 30 days showing sinus rhythm, no arrhythmias.

## 2023-04-27 NOTE — TELEPHONE ENCOUNTER
Lets have patient see cardiology in case they want to do a Lovenox bridge.  Cardiology referral placed.

## 2023-04-27 NOTE — TELEPHONE ENCOUNTER
S/W Bravo in patients office. He states they have not seen her since 2018. He states her PCP has been refilling this medication and they are not managing this.     Dr. Toscano, can you please advise on this since you took over this medication. Please call patient and advise.

## 2023-04-28 ENCOUNTER — OFFICE VISIT (OUTPATIENT)
Dept: CARDIOLOGY | Facility: CLINIC | Age: 83
End: 2023-04-28
Payer: MEDICARE

## 2023-04-28 VITALS
OXYGEN SATURATION: 98 % | BODY MASS INDEX: 25.03 KG/M2 | HEART RATE: 68 BPM | WEIGHT: 136 LBS | SYSTOLIC BLOOD PRESSURE: 132 MMHG | DIASTOLIC BLOOD PRESSURE: 58 MMHG | HEIGHT: 62 IN

## 2023-04-28 DIAGNOSIS — E78.2 MIXED HYPERLIPIDEMIA: ICD-10-CM

## 2023-04-28 DIAGNOSIS — Q21.12 PFO (PATENT FORAMEN OVALE): Primary | ICD-10-CM

## 2023-04-28 DIAGNOSIS — I10 PRIMARY HYPERTENSION: ICD-10-CM

## 2023-04-28 NOTE — TELEPHONE ENCOUNTER
Patient called back stating that she had seen Dr Collins (cardiology) this morning, he reviewed chart and did EKG and cleared patient for surgery.

## 2023-04-28 NOTE — PROGRESS NOTES
[facility]    Encounter Date: 2023    Patient ID: Anabell Vera is a 83 y.o. female.  : 1940     PCP: Alayna Toscano MD       Chief Complaint: Cardiac Clearance  (Knee Surgery )      PROBLEM LIST:  1. History of PFO  a. Echocardiogram 10/19/16: EF 65%. Significant a patent PFO.  Mild MR.  b. 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  Positive bubble study.  c. Cardiac event monitor for almost 30 days showing sinus rhythm, no arrhythmias.  2. Acute ischemic stroke  a. Carotid duplex 10/19/16: Right internal carotid 0-49% stenosis.  Left internal carotid 0-49% stenosis.  3. Malignant hypertension  4. Hyperlipidemia  5. Endometrial cancer    History of Present Illness: Anabell Vera is a 83 y.o. female who presents to the cardiology office today for cardiac clearance prior to hysterectomy secondary to endometrial cancer. Patient reports that she has been taking the same medications since she was last seen by us. She has been tolerating her plavix well without issues of bleeding. She has been doing well from a cardiac standpoint. She denies chest pain, shortness of air, palpations, orthopnea, edema, pre-syncope or syncope. She just started going back to the gym for regular exercise earlier this month and she has been tolerating this well. She is scheduled for hysterectomy on May 9.      Past Medical History:   Past Medical History:   Diagnosis Date   • Acquired female bladder prolapse 2022   • Diverticulosis of colon    • Eczema 2018   • Endometrial cancer 2023   • Esophageal reflux    • Female bladder prolapse 2022   • GERD (gastroesophageal reflux disease)    • Hematuria, microscopic 2012    W/u by urology was (-)   • Hiatal hernia    • High cholesterol     Started Rx after TIA   • Hypertension    • Hyperthyroidism     Resolved w/o Tx after ~ 6 months   • Osteopenia    • PFO (patent foramen  ovale) 10/2016    per echo   • Positive TB test    • Renal oncocytoma of right kidney 2018   • Right kidney mass 2016    found by Dr Cameron Hall   • Single kidney 2018   • TIA (transient ischemic attack) 10/2016       Past Surgical History:   Past Surgical History:   Procedure Laterality Date   • BASAL CELL CARCINOMA EXCISION      left temple   • CHOLECYSTECTOMY  2012   • COLONOSCOPY  10/10/2019    Dr Mattson, 5 yr repeat family hx colon ca   • LAPAROSCOPIC CHOLECYSTECTOMY  2012   • LAPAROSCOPIC NEPHRECTOMY Right 2018    Oncocytoma   • WISDOM TOOTH EXTRACTION         Family History:   Family History   Problem Relation Age of Onset   • Cancer Father    • Colon cancer Father    • Pneumonia Father    • Arthritis Mother    • Diabetes Mother    • Heart failure Mother    • Diabetes Brother    • Heart attack Brother    • Cancer Brother    • Colon polyps Brother    • Prostate cancer Brother    • Cancer Brother         on lips   • Diabetes Sister    • Heart attack Sister    • Multiple sclerosis Sister    • Diabetes Son    • Diabetes Maternal Grandmother    • Cervical cancer Maternal Grandmother    • Breast cancer Other    • Ovarian cancer Neg Hx        Social History:   Social History     Socioeconomic History   • Marital status:    Tobacco Use   • Smoking status: Former     Packs/day: 0.25     Years: 2.00     Pack years: 0.50     Types: Cigarettes     Quit date:      Years since quittin.3   • Smokeless tobacco: Never   • Tobacco comments:     2 years only   Vaping Use   • Vaping Use: Never used   Substance and Sexual Activity   • Alcohol use: Yes     Comment: rarely drinks wine   • Drug use: No   • Sexual activity: Not Currently     Partners: Male       Tobacco History:   Social History     Tobacco Use   Smoking Status Former   • Packs/day: 0.25   • Years: 2.00   • Pack years: 0.50   • Types: Cigarettes   • Quit date:    • Years since quittin.3   Smokeless Tobacco  "Never   Tobacco Comments    2 years only       Medications:     Current Outpatient Medications:   •  atorvastatin (LIPITOR) 80 MG tablet, Take 0.5 tablets by mouth every night at bedtime., Disp: 45 tablet, Rfl: 1  •  Calcium-Magnesium-Vitamin D 600-300-400 liquid, Take  by mouth. 2 tbsp daily, Disp: , Rfl:   •  clopidogrel (PLAVIX) 75 MG tablet, Take 1 tablet by mouth Daily., Disp: 90 tablet, Rfl: 1  •  fluticasone (FLONASE) 50 MCG/ACT nasal spray, 2 sprays into the nostril(s) as directed by provider Daily., Disp: , Rfl:   •  hydrocortisone 2.5 % cream, PRN, Disp: , Rfl:   •  Multiple Vitamins-Minerals (MULTI VITAMIN/MINERALS PO), Take  by mouth Daily. Takes every other day, Disp: , Rfl:   •  ramipril (ALTACE) 10 MG capsule, Take 1 capsule by mouth Daily., Disp: 90 capsule, Rfl: 1    Allergies:   Allergies   Allergen Reactions   • Wheat Rash   • Amoxicillin Rash   • Penicillins Rash     The following portions of the patient's history were reviewed and updated as appropriate: allergies, current medications, past family history, past medical history, past social history, past surgical history and problem list.    Review of Systems   12 point ROS negative except for that listed in the HPI.         Objective:     /58 (BP Location: Left arm, Patient Position: Sitting)   Pulse 68   Ht 157.5 cm (62\")   Wt 61.7 kg (136 lb)   LMP  (LMP Unknown)   SpO2 98%   BMI 24.87 kg/m²      Physical Exam  Constitutional: Patient appears well-developed and well-nourished.   HENT: HEENT exam unremarkable.   Neck: Neck supple. No JVD present. No carotid bruits.   Cardiovascular: Normal rate, regular rhythm and normal heart sounds. No murmur heard.   2+ symmetric pulses.   Pulmonary/Chest: Breath sounds normal. Does not exhibit tenderness.   Abdominal: Abdomen benign.   Musculoskeletal: Does not exhibit edema.   Neurological: Neurological exam unremarkable.   Vitals reviewed.    Data Review:   Lab Results   Component Value Date "    GLUCOSE 95 01/04/2023    BUN 20 01/04/2023    CREATININE 1.15 (H) 01/04/2023    EGFRIFNONA 49 (L) 03/11/2021    BCR 17.4 01/04/2023    K 4.5 01/04/2023    CO2 27.0 01/04/2023    CALCIUM 9.7 01/04/2023    ALBUMIN 4.9 01/04/2023    AST 19 01/04/2023    ALT 14 01/04/2023     Lab Results   Component Value Date    CHOL 142 01/04/2023    CHLPL 214 (H) 11/16/2015    TRIG 90 01/04/2023    HDL 53 01/04/2023    LDL 72 01/04/2023      Lab Results   Component Value Date    WBC 6.54 03/10/2023    RBC 4.70 03/10/2023    HGB 14.0 03/10/2023    HCT 41.3 03/10/2023    MCV 87.9 03/10/2023     03/10/2023     Lab Results   Component Value Date    TSH 3.320 03/30/2022     Lab Results   Component Value Date    HGBA1C 6.20 (H) 03/30/2022          ECG 12 Lead    Date/Time: 4/28/2023 9:17 AM  Performed by: Shanique Dumont PA-C  Authorized by: Shanique Dumont PA-C   Comparison: compared with previous ECG from 10/24/2021  Similar to previous ECG  Rhythm: sinus rhythm  Rate: normal  BPM: 68    Clinical impression: normal ECG               Assessment:      Diagnosis   1. PFO (patent foramen ovale)    2. Primary hypertension, well controlled   3. Mixed hyperlipidemia, well managed with statin therapy.       Plan:   Patient is stable and asymptomatic from cardiac standpoint, her ECG is within normal limits and she is felt to be of low risk to proceed with hysterectomy. She can hold plavix 7 days prior to procedure.   Continue current medications.   FU in 6 MO, sooner as needed.  Thank you for allowing us to participate in the care of your patient.         Scribed for Yoly Collins MD by Shanique Dumont PA-C. 4/28/2023  10:25 EDT     IYoly MD, personally performed the services described in this documentation as scribed by the above named individual in my presence, and it is both accurate and complete.  4/28/2023  15:54 EDT        Part of this note may be an electronic transcription/translation of spoken language to  printed text using the Dragon Dictation System.

## 2023-04-28 NOTE — TELEPHONE ENCOUNTER
LVM for pt to return call.     HUB - please advise the following. Lets have patient see cardiology in case they want to do a Lovenox bridge.  Cardiology referral placed.

## 2023-05-02 ENCOUNTER — HOSPITAL ENCOUNTER (OUTPATIENT)
Dept: GENERAL RADIOLOGY | Facility: HOSPITAL | Age: 83
Discharge: HOME | End: 2023-05-02
Payer: MEDICARE

## 2023-05-02 ENCOUNTER — PRE-ADMISSION TESTING (OUTPATIENT)
Dept: PREADMISSION TESTING | Facility: HOSPITAL | Age: 83
End: 2023-05-02
Payer: MEDICARE

## 2023-05-02 VITALS — BODY MASS INDEX: 25.18 KG/M2 | HEIGHT: 61 IN | WEIGHT: 133.38 LBS

## 2023-05-02 DIAGNOSIS — C54.1 ENDOMETRIAL CANCER: ICD-10-CM

## 2023-05-02 DIAGNOSIS — Z01.89 LABORATORY TEST: Primary | ICD-10-CM

## 2023-05-02 LAB
ABO GROUP BLD: NORMAL
ALBUMIN SERPL-MCNC: 4.6 G/DL (ref 3.5–5.2)
ALBUMIN/GLOB SERPL: 2.1 G/DL
ALP SERPL-CCNC: 67 U/L (ref 39–117)
ALT SERPL W P-5'-P-CCNC: 13 U/L (ref 1–33)
ANION GAP SERPL CALCULATED.3IONS-SCNC: 8 MMOL/L (ref 5–15)
AST SERPL-CCNC: 20 U/L (ref 1–32)
BASOPHILS # BLD AUTO: 0.02 10*3/MM3 (ref 0–0.2)
BASOPHILS NFR BLD AUTO: 0.4 % (ref 0–1.5)
BILIRUB SERPL-MCNC: 0.5 MG/DL (ref 0–1.2)
BUN SERPL-MCNC: 17 MG/DL (ref 8–23)
BUN/CREAT SERPL: 16 (ref 7–25)
CALCIUM SPEC-SCNC: 9.6 MG/DL (ref 8.6–10.5)
CHLORIDE SERPL-SCNC: 96 MMOL/L (ref 98–107)
CO2 SERPL-SCNC: 27 MMOL/L (ref 22–29)
CREAT SERPL-MCNC: 1.06 MG/DL (ref 0.57–1)
DEPRECATED RDW RBC AUTO: 43.4 FL (ref 37–54)
EGFRCR SERPLBLD CKD-EPI 2021: 52.6 ML/MIN/1.73
EOSINOPHIL # BLD AUTO: 0.06 10*3/MM3 (ref 0–0.4)
EOSINOPHIL NFR BLD AUTO: 1.3 % (ref 0.3–6.2)
ERYTHROCYTE [DISTWIDTH] IN BLOOD BY AUTOMATED COUNT: 12.9 % (ref 12.3–15.4)
GLOBULIN UR ELPH-MCNC: 2.2 GM/DL
GLUCOSE SERPL-MCNC: 109 MG/DL (ref 65–99)
HCT VFR BLD AUTO: 45.6 % (ref 34–46.6)
HGB BLD-MCNC: 14.6 G/DL (ref 12–15.9)
IMM GRANULOCYTES # BLD AUTO: 0.01 10*3/MM3 (ref 0–0.05)
IMM GRANULOCYTES NFR BLD AUTO: 0.2 % (ref 0–0.5)
INR PPP: 0.93 (ref 0.89–1.12)
LYMPHOCYTES # BLD AUTO: 1.17 10*3/MM3 (ref 0.7–3.1)
LYMPHOCYTES NFR BLD AUTO: 24.4 % (ref 19.6–45.3)
MCH RBC QN AUTO: 29.3 PG (ref 26.6–33)
MCHC RBC AUTO-ENTMCNC: 32 G/DL (ref 31.5–35.7)
MCV RBC AUTO: 91.4 FL (ref 79–97)
MONOCYTES # BLD AUTO: 0.54 10*3/MM3 (ref 0.1–0.9)
MONOCYTES NFR BLD AUTO: 11.3 % (ref 5–12)
NEUTROPHILS NFR BLD AUTO: 2.99 10*3/MM3 (ref 1.7–7)
NEUTROPHILS NFR BLD AUTO: 62.4 % (ref 42.7–76)
NRBC BLD AUTO-RTO: 0 /100 WBC (ref 0–0.2)
PLATELET # BLD AUTO: 185 10*3/MM3 (ref 140–450)
PMV BLD AUTO: 10.8 FL (ref 6–12)
POTASSIUM SERPL-SCNC: 4.8 MMOL/L (ref 3.5–5.2)
PROT SERPL-MCNC: 6.8 G/DL (ref 6–8.5)
PROTHROMBIN TIME: 12.6 SECONDS (ref 12.2–14.5)
RBC # BLD AUTO: 4.99 10*6/MM3 (ref 3.77–5.28)
RH BLD: POSITIVE
SODIUM SERPL-SCNC: 131 MMOL/L (ref 136–145)
WBC NRBC COR # BLD: 4.79 10*3/MM3 (ref 3.4–10.8)

## 2023-05-02 PROCEDURE — 85025 COMPLETE CBC W/AUTO DIFF WBC: CPT

## 2023-05-02 PROCEDURE — 36415 COLL VENOUS BLD VENIPUNCTURE: CPT

## 2023-05-02 PROCEDURE — 71045 X-RAY EXAM CHEST 1 VIEW: CPT

## 2023-05-02 PROCEDURE — 80053 COMPREHEN METABOLIC PANEL: CPT

## 2023-05-02 PROCEDURE — 85610 PROTHROMBIN TIME: CPT

## 2023-05-02 PROCEDURE — 86900 BLOOD TYPING SEROLOGIC ABO: CPT

## 2023-05-02 PROCEDURE — 86901 BLOOD TYPING SEROLOGIC RH(D): CPT

## 2023-05-02 NOTE — PAT
Patient viewed general PAT education video as instructed in their preoperative information received from their surgeon.  Patient stated the general PAT education video was viewed in its entirety and survey completed.  Copies of [facility] general education handouts (Incentive Spirometry, Meds to Beds Program, Patient Belongings, Pre-op skin preparation instructions, Blood Glucose testing, Visitor policy, Surgery FAQ, Code H) distributed to patient if not printed. Education related to the PAT pass and skin preparation for surgery (if applicable) completed in PAT as a reinforcement to PAT education video. Patient instructed to return PAT pass provided today as well as completed skin preparation sheet (if applicable) on the day of procedure.     Additionally if patient had not viewed video yet but intended to view it at home or in our waiting area, then referred them to the handout with QR code/link provided during PAT visit.  Instructed patient to complete survey after viewing the video in its entirety.  Encouraged patient/family to read [facility] general education handouts thoroughly and notify PAT staff with any questions or concerns. Patient verbalized understanding of all information and priority content.    An arrival time for procedure was not provided during PAT visit. If patient had any questions or concerns about their arrival time, they were instructed to contact their surgeon/physician.  Additionally, if the patient referred to an arrival time that was acquired from their my chart account, patient was encouraged to verify that time with their surgeon/physician. Arrival times are NOT provided in Pre Admission Testing Department.    Patient to apply Chlorhexadine wipes  to surgical area (as instructed) the night before procedure and the AM of procedure. Wipes provided.    Patient denies any current skin issues.     Patient directed to Radiology Department for CXR after Pre Admission Testing Appointment.     Post-Surgery  Information Instruction Sheet given to patient during Pre-Admission Testing Visit with verbal instructions to patient to return with PAT PASS on the day of surgery. Additionally, encouraged patient to review the information provided.    Verified patient previously completed cardiology visit for cardiac risk assessment in preparation for upcoming procedure, completion of 12-lead ECG within six months, and risk assessment letter reviewed. No further interventions required.   4/28/23 FROM DR LEBLANC. PATIENT TO HOLD PLAVIX 7 DAYS PRIOR TO SURGERY. PATIENT STATES LAST DOSE OF PLAVIX TO BE 5/2/23    Too early to draw type and screen in PAT.  Please obtain blood bank specimen in pre-op on the day of surgery.  BLUE NOTE LEFT ON CHART AND ONE PINK TUBE SENT DURING PAT VISIT. BLOOD PERMIT SIGNED IN PAT

## 2023-05-08 ENCOUNTER — ANESTHESIA EVENT (OUTPATIENT)
Dept: PERIOP | Facility: HOSPITAL | Age: 83
End: 2023-05-08
Payer: MEDICARE

## 2023-05-08 RX ORDER — FAMOTIDINE 10 MG/ML
20 INJECTION, SOLUTION INTRAVENOUS ONCE
Status: CANCELLED | OUTPATIENT
Start: 2023-05-08 | End: 2023-05-08

## 2023-05-09 ENCOUNTER — HOSPITAL ENCOUNTER (OUTPATIENT)
Facility: HOSPITAL | Age: 83
Setting detail: HOSPITAL OUTPATIENT SURGERY
Discharge: HOME | End: 2023-05-09
Attending: OBSTETRICS & GYNECOLOGY | Admitting: OBSTETRICS & GYNECOLOGY
Payer: MEDICARE

## 2023-05-09 ENCOUNTER — ANESTHESIA (OUTPATIENT)
Dept: PERIOP | Facility: HOSPITAL | Age: 83
End: 2023-05-09
Payer: MEDICARE

## 2023-05-09 VITALS
BODY MASS INDEX: 25.18 KG/M2 | HEIGHT: 61 IN | OXYGEN SATURATION: 98 % | HEART RATE: 72 BPM | DIASTOLIC BLOOD PRESSURE: 78 MMHG | WEIGHT: 133.38 LBS | SYSTOLIC BLOOD PRESSURE: 134 MMHG | RESPIRATION RATE: 18 BRPM | TEMPERATURE: 97.2 F

## 2023-05-09 DIAGNOSIS — C54.1 ENDOMETRIAL CANCER: ICD-10-CM

## 2023-05-09 LAB
ABO GROUP BLD: NORMAL
BLD GP AB SCN SERPL QL: NEGATIVE
RH BLD: POSITIVE
T&S EXPIRATION DATE: NORMAL

## 2023-05-09 PROCEDURE — 38571 LAPAROSCOPY LYMPHADENECTOMY: CPT | Performed by: PHYSICIAN ASSISTANT

## 2023-05-09 PROCEDURE — 88309 TISSUE EXAM BY PATHOLOGIST: CPT | Performed by: OBSTETRICS & GYNECOLOGY

## 2023-05-09 PROCEDURE — 25010000002 ONDANSETRON PER 1 MG

## 2023-05-09 PROCEDURE — 25010000002 FENTANYL CITRATE (PF) 50 MCG/ML SOLUTION

## 2023-05-09 PROCEDURE — 86900 BLOOD TYPING SEROLOGIC ABO: CPT | Performed by: OBSTETRICS & GYNECOLOGY

## 2023-05-09 PROCEDURE — 58571 TLH W/T/O 250 G OR LESS: CPT | Performed by: PHYSICIAN ASSISTANT

## 2023-05-09 PROCEDURE — 25010000002 DEXAMETHASONE PER 1 MG: Performed by: ANESTHESIOLOGY

## 2023-05-09 PROCEDURE — 38571 LAPAROSCOPY LYMPHADENECTOMY: CPT | Performed by: OBSTETRICS & GYNECOLOGY

## 2023-05-09 PROCEDURE — 25010000002 PROPOFOL 10 MG/ML EMULSION: Performed by: ANESTHESIOLOGY

## 2023-05-09 PROCEDURE — 25010000002 FENTANYL CITRATE (PF) 100 MCG/2ML SOLUTION: Performed by: ANESTHESIOLOGY

## 2023-05-09 PROCEDURE — 25010000002 SUGAMMADEX 200 MG/2ML SOLUTION

## 2023-05-09 PROCEDURE — 38900 IO MAP OF SENT LYMPH NODE: CPT | Performed by: PHYSICIAN ASSISTANT

## 2023-05-09 PROCEDURE — 86901 BLOOD TYPING SEROLOGIC RH(D): CPT | Performed by: OBSTETRICS & GYNECOLOGY

## 2023-05-09 PROCEDURE — 88342 IMHCHEM/IMCYTCHM 1ST ANTB: CPT | Performed by: OBSTETRICS & GYNECOLOGY

## 2023-05-09 PROCEDURE — 88307 TISSUE EXAM BY PATHOLOGIST: CPT | Performed by: OBSTETRICS & GYNECOLOGY

## 2023-05-09 PROCEDURE — 25010000002 CEFAZOLIN IN DEXTROSE 2-4 GM/100ML-% SOLUTION: Performed by: OBSTETRICS & GYNECOLOGY

## 2023-05-09 PROCEDURE — 38900 IO MAP OF SENT LYMPH NODE: CPT | Performed by: OBSTETRICS & GYNECOLOGY

## 2023-05-09 PROCEDURE — 25010000002 INDOCYANINE GREEN 25 MG RECONSTITUTED SOLUTION: Performed by: OBSTETRICS & GYNECOLOGY

## 2023-05-09 PROCEDURE — 58571 TLH W/T/O 250 G OR LESS: CPT | Performed by: OBSTETRICS & GYNECOLOGY

## 2023-05-09 PROCEDURE — 25010000002 HEPARIN (PORCINE) PER 1000 UNITS: Performed by: OBSTETRICS & GYNECOLOGY

## 2023-05-09 PROCEDURE — 86850 RBC ANTIBODY SCREEN: CPT | Performed by: OBSTETRICS & GYNECOLOGY

## 2023-05-09 RX ORDER — OXYCODONE HYDROCHLORIDE AND ACETAMINOPHEN 5; 325 MG/1; MG/1
1 TABLET ORAL EVERY 8 HOURS PRN
Qty: 5 TABLET | Refills: 0 | Status: SHIPPED | OUTPATIENT
Start: 2023-05-09

## 2023-05-09 RX ORDER — LIDOCAINE HYDROCHLORIDE 10 MG/ML
0.5 INJECTION, SOLUTION EPIDURAL; INFILTRATION; INTRACAUDAL; PERINEURAL ONCE AS NEEDED
Status: COMPLETED | OUTPATIENT
Start: 2023-05-09 | End: 2023-05-09

## 2023-05-09 RX ORDER — FENTANYL CITRATE 50 UG/ML
INJECTION, SOLUTION INTRAMUSCULAR; INTRAVENOUS
Status: COMPLETED
Start: 2023-05-09 | End: 2023-05-09

## 2023-05-09 RX ORDER — ONDANSETRON 4 MG/1
4 TABLET, FILM COATED ORAL ONCE AS NEEDED
Status: DISCONTINUED | OUTPATIENT
Start: 2023-05-09 | End: 2023-05-09 | Stop reason: HOSPADM

## 2023-05-09 RX ORDER — SODIUM CHLORIDE 9 MG/ML
40 INJECTION, SOLUTION INTRAVENOUS AS NEEDED
Status: DISCONTINUED | OUTPATIENT
Start: 2023-05-09 | End: 2023-05-09 | Stop reason: HOSPADM

## 2023-05-09 RX ORDER — ONDANSETRON 2 MG/ML
4 INJECTION INTRAMUSCULAR; INTRAVENOUS ONCE AS NEEDED
Status: DISCONTINUED | OUTPATIENT
Start: 2023-05-09 | End: 2023-05-09 | Stop reason: HOSPADM

## 2023-05-09 RX ORDER — INDOCYANINE GREEN AND WATER 25 MG
KIT INJECTION AS NEEDED
Status: DISCONTINUED | OUTPATIENT
Start: 2023-05-09 | End: 2023-05-09 | Stop reason: HOSPADM

## 2023-05-09 RX ORDER — ONDANSETRON 2 MG/ML
INJECTION INTRAMUSCULAR; INTRAVENOUS AS NEEDED
Status: DISCONTINUED | OUTPATIENT
Start: 2023-05-09 | End: 2023-05-09 | Stop reason: SURG

## 2023-05-09 RX ORDER — MIDAZOLAM HYDROCHLORIDE 1 MG/ML
0.5 INJECTION INTRAMUSCULAR; INTRAVENOUS
Status: DISCONTINUED | OUTPATIENT
Start: 2023-05-09 | End: 2023-05-09 | Stop reason: HOSPADM

## 2023-05-09 RX ORDER — MAGNESIUM HYDROXIDE 1200 MG/15ML
LIQUID ORAL AS NEEDED
Status: DISCONTINUED | OUTPATIENT
Start: 2023-05-09 | End: 2023-05-09 | Stop reason: HOSPADM

## 2023-05-09 RX ORDER — GABAPENTIN 300 MG/1
300 CAPSULE ORAL ONCE
Status: DISCONTINUED | OUTPATIENT
Start: 2023-05-09 | End: 2023-05-09

## 2023-05-09 RX ORDER — FENTANYL CITRATE 50 UG/ML
50 INJECTION, SOLUTION INTRAMUSCULAR; INTRAVENOUS
Status: DISCONTINUED | OUTPATIENT
Start: 2023-05-09 | End: 2023-05-09 | Stop reason: HOSPADM

## 2023-05-09 RX ORDER — EPHEDRINE SULFATE 50 MG/ML
5 INJECTION, SOLUTION INTRAVENOUS ONCE AS NEEDED
Status: DISCONTINUED | OUTPATIENT
Start: 2023-05-09 | End: 2023-05-09 | Stop reason: HOSPADM

## 2023-05-09 RX ORDER — BUPIVACAINE HYDROCHLORIDE AND EPINEPHRINE 5; 5 MG/ML; UG/ML
INJECTION, SOLUTION PERINEURAL AS NEEDED
Status: DISCONTINUED | OUTPATIENT
Start: 2023-05-09 | End: 2023-05-09 | Stop reason: HOSPADM

## 2023-05-09 RX ORDER — ACETAMINOPHEN 325 MG/1
650 TABLET ORAL EVERY 4 HOURS PRN
Qty: 60 TABLET | Refills: 0 | Status: SHIPPED | OUTPATIENT
Start: 2023-05-09

## 2023-05-09 RX ORDER — SODIUM CHLORIDE, SODIUM LACTATE, POTASSIUM CHLORIDE, CALCIUM CHLORIDE 600; 310; 30; 20 MG/100ML; MG/100ML; MG/100ML; MG/100ML
100 INJECTION, SOLUTION INTRAVENOUS CONTINUOUS
Status: DISCONTINUED | OUTPATIENT
Start: 2023-05-09 | End: 2023-05-09 | Stop reason: HOSPADM

## 2023-05-09 RX ORDER — SODIUM CHLORIDE, SODIUM LACTATE, POTASSIUM CHLORIDE, CALCIUM CHLORIDE 600; 310; 30; 20 MG/100ML; MG/100ML; MG/100ML; MG/100ML
9 INJECTION, SOLUTION INTRAVENOUS CONTINUOUS
Status: DISCONTINUED | OUTPATIENT
Start: 2023-05-09 | End: 2023-05-09 | Stop reason: HOSPADM

## 2023-05-09 RX ORDER — SODIUM CHLORIDE 0.9 % (FLUSH) 0.9 %
10 SYRINGE (ML) INJECTION EVERY 12 HOURS SCHEDULED
Status: DISCONTINUED | OUTPATIENT
Start: 2023-05-09 | End: 2023-05-09 | Stop reason: HOSPADM

## 2023-05-09 RX ORDER — PROMETHAZINE HYDROCHLORIDE 12.5 MG/1
12.5 TABLET ORAL ONCE AS NEEDED
Status: DISCONTINUED | OUTPATIENT
Start: 2023-05-09 | End: 2023-05-09 | Stop reason: HOSPADM

## 2023-05-09 RX ORDER — OXYCODONE HYDROCHLORIDE 5 MG/1
5 TABLET ORAL ONCE
Status: DISCONTINUED | OUTPATIENT
Start: 2023-05-09 | End: 2023-05-09

## 2023-05-09 RX ORDER — DOCUSATE SODIUM 250 MG
250 CAPSULE ORAL 2 TIMES DAILY
Qty: 60 CAPSULE | Refills: 0 | Status: SHIPPED | OUTPATIENT
Start: 2023-05-09

## 2023-05-09 RX ORDER — LIDOCAINE HYDROCHLORIDE 10 MG/ML
INJECTION, SOLUTION EPIDURAL; INFILTRATION; INTRACAUDAL; PERINEURAL AS NEEDED
Status: DISCONTINUED | OUTPATIENT
Start: 2023-05-09 | End: 2023-05-09 | Stop reason: SURG

## 2023-05-09 RX ORDER — HEPARIN SODIUM 5000 [USP'U]/ML
5000 INJECTION, SOLUTION INTRAVENOUS; SUBCUTANEOUS ONCE
Status: COMPLETED | OUTPATIENT
Start: 2023-05-09 | End: 2023-05-09

## 2023-05-09 RX ORDER — NALOXONE HCL 0.4 MG/ML
0.4 VIAL (ML) INJECTION AS NEEDED
Status: DISCONTINUED | OUTPATIENT
Start: 2023-05-09 | End: 2023-05-09 | Stop reason: HOSPADM

## 2023-05-09 RX ORDER — DEXAMETHASONE SODIUM PHOSPHATE 4 MG/ML
INJECTION, SOLUTION INTRA-ARTICULAR; INTRALESIONAL; INTRAMUSCULAR; INTRAVENOUS; SOFT TISSUE AS NEEDED
Status: DISCONTINUED | OUTPATIENT
Start: 2023-05-09 | End: 2023-05-09 | Stop reason: SURG

## 2023-05-09 RX ORDER — WATER 1000 ML/1000ML
INJECTION, SOLUTION INTRAVENOUS AS NEEDED
Status: DISCONTINUED | OUTPATIENT
Start: 2023-05-09 | End: 2023-05-09 | Stop reason: HOSPADM

## 2023-05-09 RX ORDER — SODIUM CHLORIDE 9 MG/ML
INJECTION, SOLUTION INTRAVENOUS AS NEEDED
Status: DISCONTINUED | OUTPATIENT
Start: 2023-05-09 | End: 2023-05-09 | Stop reason: HOSPADM

## 2023-05-09 RX ORDER — FAMOTIDINE 20 MG/1
20 TABLET, FILM COATED ORAL ONCE
Status: COMPLETED | OUTPATIENT
Start: 2023-05-09 | End: 2023-05-09

## 2023-05-09 RX ORDER — CEFAZOLIN SODIUM 2 G/100ML
2 INJECTION, SOLUTION INTRAVENOUS ONCE
Status: COMPLETED | OUTPATIENT
Start: 2023-05-09 | End: 2023-05-09

## 2023-05-09 RX ORDER — ACETAMINOPHEN 500 MG
1000 TABLET ORAL ONCE
Status: COMPLETED | OUTPATIENT
Start: 2023-05-09 | End: 2023-05-09

## 2023-05-09 RX ORDER — FENTANYL CITRATE 50 UG/ML
INJECTION, SOLUTION INTRAMUSCULAR; INTRAVENOUS AS NEEDED
Status: DISCONTINUED | OUTPATIENT
Start: 2023-05-09 | End: 2023-05-09 | Stop reason: SURG

## 2023-05-09 RX ORDER — PROMETHAZINE HYDROCHLORIDE 12.5 MG/1
12.5 SUPPOSITORY RECTAL ONCE AS NEEDED
Status: DISCONTINUED | OUTPATIENT
Start: 2023-05-09 | End: 2023-05-09 | Stop reason: HOSPADM

## 2023-05-09 RX ORDER — SODIUM CHLORIDE 0.9 % (FLUSH) 0.9 %
10 SYRINGE (ML) INJECTION AS NEEDED
Status: DISCONTINUED | OUTPATIENT
Start: 2023-05-09 | End: 2023-05-09 | Stop reason: HOSPADM

## 2023-05-09 RX ORDER — SODIUM CHLORIDE 0.9 % (FLUSH) 0.9 %
3 SYRINGE (ML) INJECTION EVERY 12 HOURS SCHEDULED
Status: DISCONTINUED | OUTPATIENT
Start: 2023-05-09 | End: 2023-05-09 | Stop reason: HOSPADM

## 2023-05-09 RX ORDER — PROPOFOL 10 MG/ML
VIAL (ML) INTRAVENOUS AS NEEDED
Status: DISCONTINUED | OUTPATIENT
Start: 2023-05-09 | End: 2023-05-09 | Stop reason: SURG

## 2023-05-09 RX ORDER — ROCURONIUM BROMIDE 10 MG/ML
INJECTION, SOLUTION INTRAVENOUS AS NEEDED
Status: DISCONTINUED | OUTPATIENT
Start: 2023-05-09 | End: 2023-05-09 | Stop reason: SURG

## 2023-05-09 RX ADMIN — LIDOCAINE HYDROCHLORIDE 50 MG: 10 INJECTION, SOLUTION EPIDURAL; INFILTRATION; INTRACAUDAL; PERINEURAL at 10:18

## 2023-05-09 RX ADMIN — ROCURONIUM BROMIDE 50 MG: 10 SOLUTION INTRAVENOUS at 10:18

## 2023-05-09 RX ADMIN — PROPOFOL 100 MG: 10 INJECTION, EMULSION INTRAVENOUS at 10:18

## 2023-05-09 RX ADMIN — FENTANYL CITRATE 25 MCG: 50 INJECTION, SOLUTION INTRAMUSCULAR; INTRAVENOUS at 10:41

## 2023-05-09 RX ADMIN — FENTANYL CITRATE 25 MCG: 50 INJECTION, SOLUTION INTRAMUSCULAR; INTRAVENOUS at 11:10

## 2023-05-09 RX ADMIN — FENTANYL CITRATE 50 MCG: 50 INJECTION, SOLUTION INTRAMUSCULAR; INTRAVENOUS at 10:18

## 2023-05-09 RX ADMIN — ONDANSETRON 4 MG: 2 INJECTION INTRAMUSCULAR; INTRAVENOUS at 11:20

## 2023-05-09 RX ADMIN — HEPARIN SODIUM 5000 UNITS: 5000 INJECTION INTRAVENOUS; SUBCUTANEOUS at 07:51

## 2023-05-09 RX ADMIN — CEFAZOLIN SODIUM 2 G: 2 INJECTION, SOLUTION INTRAVENOUS at 10:23

## 2023-05-09 RX ADMIN — FENTANYL CITRATE 50 MCG: 50 INJECTION, SOLUTION INTRAMUSCULAR; INTRAVENOUS at 12:57

## 2023-05-09 RX ADMIN — SODIUM CHLORIDE, POTASSIUM CHLORIDE, SODIUM LACTATE AND CALCIUM CHLORIDE 100 ML/HR: 600; 310; 30; 20 INJECTION, SOLUTION INTRAVENOUS at 07:49

## 2023-05-09 RX ADMIN — PROPOFOL 25 MCG/KG/MIN: 10 INJECTION, EMULSION INTRAVENOUS at 10:23

## 2023-05-09 RX ADMIN — SUGAMMADEX 200 MG: 100 INJECTION, SOLUTION INTRAVENOUS at 11:23

## 2023-05-09 RX ADMIN — LIDOCAINE HYDROCHLORIDE 0.5 ML: 10 INJECTION, SOLUTION EPIDURAL; INFILTRATION; INTRACAUDAL; PERINEURAL at 07:51

## 2023-05-09 RX ADMIN — ACETAMINOPHEN 1000 MG: 500 TABLET ORAL at 07:51

## 2023-05-09 RX ADMIN — FAMOTIDINE 20 MG: 20 TABLET ORAL at 07:51

## 2023-05-09 RX ADMIN — DEXAMETHASONE SODIUM PHOSPHATE 4 MG: 4 INJECTION, SOLUTION INTRAMUSCULAR; INTRAVENOUS at 10:23

## 2023-05-09 NOTE — ANESTHESIA POSTPROCEDURE EVALUATION
Patient: Anabell Vera    Procedure Summary     Date: 05/09/23 [facility] / [facility]    Anesthesia Start: 1013 Anesthesia Stop: 1146    Procedure: TOTAL LAPAROSCOPIC HYSTERECTOMY BILATERAL SALPINGO-OOPHORECTOMY, INJECTION FOR SENTINEL LYMPH NODE MAPPING, BILATERAL SENTINEL LYMPH NODE DISSECTION WITH DAVINCI ROBOT (Abdomen) Diagnosis:       Endometrial cancer      (Endometrial cancer [C54.1])    Surgeons: Lindsay Kingston MD Provider: Thai Roman MD    Anesthesia Type: general ASA Status: 3          Anesthesia Type: general    Vitals  Vitals Value Taken Time   /64 05/09/23 1145   Temp     Pulse 55 05/09/23 1147   Resp     SpO2 100 % 05/09/23 1147   Vitals shown include unvalidated device data.        Post Anesthesia Care and Evaluation    Patient location during evaluation: PACU  Patient participation: complete - patient participated  Level of consciousness: responsive to verbal stimuli  Pain score: 0  Pain management: adequate    Airway patency: patent  Anesthetic complications: No anesthetic complications  PONV Status: none  Cardiovascular status: hemodynamically stable and acceptable  Respiratory status: nonlabored ventilation, acceptable and nasal cannula  Hydration status: acceptable

## 2023-05-09 NOTE — ANESTHESIA PROCEDURE NOTES
Airway  Urgency: elective    Date/Time: 5/9/2023 10:21 AM  Airway not difficult    General Information and Staff    Patient location during procedure: OR  SRNA: Gato Parekh SRNA  Indications and Patient Condition  Indications for airway management: airway protection    Preoxygenated: yes  MILS not maintained throughout  Mask difficulty assessment: 1 - vent by mask    Final Airway Details  Final airway type: endotracheal airway      Successful airway: ETT  Cuffed: yes   Successful intubation technique: direct laryngoscopy  Endotracheal tube insertion site: oral  Blade: Correa  Blade size: 2  ETT size (mm): 7.0  Cormack-Lehane Classification: grade I - full view of glottis  Placement verified by: chest auscultation and capnometry   Cuff volume (mL): 7  Measured from: lips  ETT/EBT  to lips (cm): 21  Number of attempts at approach: 1  Assessment: lips, teeth, and gum same as pre-op and atraumatic intubation    Additional Comments  Negative epigastric sounds, Breath sound equal bilaterally with symmetric chest rise and fall

## 2023-05-09 NOTE — ANESTHESIA PREPROCEDURE EVALUATION
Anesthesia Evaluation     Patient summary reviewed and Nursing notes reviewed   NPO Solid Status: > 8 hours  NPO Liquid Status: > 8 hours           Airway   Mallampati: I  TM distance: >3 FB  Neck ROM: full  No difficulty expected  Dental      Pulmonary     breath sounds clear to auscultation  Cardiovascular     ECG reviewed  Rhythm: regular  Rate: normal    (+) hypertension, hyperlipidemia,       Neuro/Psych  (+) TIA,    GI/Hepatic/Renal/Endo    (+)   renal disease (s/p nephrectomy; now with single kidney),     Musculoskeletal     Abdominal    Substance History      OB/GYN          Other      history of cancer    ROS/Med Hx Other: ? Mild mitral valve regurgitation is present  ? Left ventricular function is normal. Estimated EF = 65%.  ? significantly patent PFO                    Anesthesia Plan    ASA 3     general     (PFO;  Fastidious attention to avoiding air in IV)  intravenous induction     Anesthetic plan, risks, benefits, and alternatives have been provided, discussed and informed consent has been obtained with: patient.    Plan discussed with CRNA.        CODE STATUS:

## 2023-05-09 NOTE — OP NOTE
Operative Note     Patient Name: Anabell Vera  : 1940   MRN: [id number]   Date: 23  Time: : EDT    Date of Service:  23  Time of Service:   EDT    Surgical Staff: Surgeon(s) and Role:     * Lindsay Kingston MD - Primary   Additional Staff:   Assistant: Juaquin Chairez PA-C  was responsible for performing the following activities: Retraction, Suction, Irrigation, Suturing, Closing, Placing Dressing and Held/Positioned Camera and their skilled assistance was necessary for the success of this case.     Pre-operative diagnosis(es): Pre-Op Diagnosis Codes:     * Endometrial cancer [C54.1]     Post-operative diagnosis(es): Post-Op Diagnosis Codes:     * Endometrial cancer [C54.1]   Procedure(s): Procedure(s):  TOTAL LAPAROSCOPIC HYSTERECTOMY BILATERAL SALPINGO-OOPHORECTOMY, INJECTION FOR SENTINEL LYMPH NODE MAPPING, BILATERAL SENTINEL LYMPH NODE DISSECTION WITH DAVINCI ROBOT     Antibiotics: cefazolin (Ancef) ordered on call to OR     Anesthesia: Type: General  ASA:  III     Objective      Operative findings: Normal appearing cervix. Uterus sounded to 6 cm. Normal-appearing bilateral adnexa. No evidence of extrauterine disease. Bilateral sentinel lymph node mapping to the obturator spaces. Upper abdominal survey normal.   Specimens removed: ID Type Source Tests Collected by Time   A (Not marked as sent) : RIGHT PELVIC SENTINEL LYMPH NODE Tissue [place] Lymph Node TISSUE PATHOLOGY EXAM Lindsay Kingston MD 2023 1049   B (Not marked as sent) : LEFT PELVIC SENTINEL LYMPH NODE Tissue [place] Lymph Node TISSUE PATHOLOGY EXAM Lindsay Kingston MD 2023 1103   C (Not marked as sent) : UTERUS CERVIX, BILATERAL TUBES AND OVARIES Tissue Uterus with Cervix, Bilateral Tubes and Ovaries TISSUE PATHOLOGY EXAM Lindsay Kingston MD 2023 1111      Fluid Intake and Output: I/O this shift:  In: 600 [I.V.:600]  Out: 325 [Urine:225; Blood:100]   Blood products used: No   Drains: [REMOVED]  Urethral Catheter 16 Fr. (Removed)      Implant Information: Nothing was implanted during the procedure   Complications: None   Intraoperative consult(s):    Condition: stable   Disposition: to PACU and then discharge home     INDICATIONS FOR PROCEDURE:  Anabell Vera is a 83 y.o. female with a history of postmenopausal bleeding and uterine sampling demonstrating FIGO grade 1 endometrial cancer. She was counseled regarding options and she desired to proceed with hysterectomy, BSO and staging by a robotic approach. Questions were answered and consent was signed.      DESCRIPTION OF PROCEDURE:  The patient was taken to the operating room after the sites had been marked. After a time out procedure was performed, and the planned procedure and patient were confirmed, she was placed under general anesthesia with endotracheal intubation  She received prophylactic antibiotics prior to skin incision.  She was placed in the dorsal lithotomy position in the [facility] and prepared and draped in normal sterile fashion. A buchanan catheter was placed sterilely.     A supraumbilical incision was then made with a scalpel.The peritoneal cavity was entered with the Veress needle. Correct placement of the Veress needle into the peritoneal cavity was confirmed by a drop in pressure. The abdomen was insufflated to a pressure of 15 mmHg. An 8-mm endoscope trocar was then placed through the incision with the findings as noted. Three 8-mm trocars were placed in the left and right upper quadrants. A 8-mm endoscopic trocar was placed in the right upper quadrant. The patient was placed in Trendelenburg and the bowel packed into the upper abdomen. The aforementioned findings were noted. The robot was then docked and the instruments placed under direct visualization.      Prior to beginning the hysterectomy, a speculum was placed into the vagina and ICG dye injected into the cervix. A Bren manipulator (6 cm tip and 2.5 cm cup) with  pneumo-occluder was placed in the vagina.     Attention was first turned turned to the pelvic sentinel lymphadenectomy.  The sigmoid colon was adherent to the left pelvic sidewall and adnexa.  Adhesions were lysed using a combination of blunt and sharp dissection.  The retroperitoneum was opened bilaterally and the pararectal and paravesical spaces were developed.  On the right, she had a sentinel lymph node in the obturator space.  The obturator nerve and ureter were identified and kept out of harm's way.  The lymph node was then carefully dissected free and sent for pathologic evaluation (permanent).  On the left, a sentinel node was identified in the obturator space. The ureter and obturator nerve was identified and kept out of harm's way.  The sentinel lymph node was then dissected free from surrounding tissue and sent for pathology.  Hemostasis was confirmed. Of note, all nodes were removed in bags through the 8-mm right sided port.     The round ligament was grasped, and the peritoneum over the sidewall was opened sharply.  The retroperitoneal space was then further developed, and the ureter was identified.  The peritoneum between the ureter and the infundibulopelvic ligament was then incised and a window was created.  The ovarian vessels were then thoroughly coagulated and transected.  The round ligament was then transected, and the vesicouterine peritoneum was further skeletonized to the midline. Similar steps were performed on the left side where also the retroperitoneum was opened, the ureter was visualized, and a window was then created between the ovarian vessels and the ureter prior to coagulating and transecting the ovarian vessels. The vesicouterine peritoneum was then further skeletonized, and the vaginal manipulator was further inserted to identify the vagina and to allow dissection of the bladder off of the vagina. Next the uterine vessels were carefully skeletonized before being coagulated and  transected.  Subsequent pedicles were created closely along the cervix of the cardinal ligament and uterosacral ligaments. A colpotomy was then made until the uterus was fully removed from the vagina and the uterus handed off for permanent pathology. The vaginal cuff was closed with figure of eight 0-Vicryls.     We again inspected all areas, and they all were found to be hemostatic, and no instruments, needles or sponges were left in the abdomen. The bladder was backfilled with 120 cc of sterile saline and the buchanan catheter removed. The robotic instruments were removed and the robot undocked. Skin incisions were closed with 4-0 monocryl in a subcuticular fashion, and skin adhesive was placed over these incisions. Sponge, lap and needle counts were correct x2.  The patient was then woken up from anesthesia and taken to PACU in stable condition.

## 2023-05-09 NOTE — H&P
"  Pre-Op H&P  Anabell Vera  [id number]  1940      Chief complaint: Endometrial cancer      Subjective:  Patient is a 83 y.o.female presents for scheduled surgery by Dr. Kingston. She anticipates a TOTAL LAPAROSCOPIC HYSTERECTOMY BILATERAL SALPINGO-OOPHORECTOMY, INJECTION FOR SENTINEL LYMPH NODE MAPPING, BILATERAL SENTINEL LYMPH NODE DISSECTION (POSSIBLE FULL LYMPH NODE DISSECTION) WITH DAVINCI ROBOT today.    Per office note 23: (((Anabell Vera is a 83 y.o.  postmenopausal female who is here today as a consultation with gynecologic oncology for reasons as stated above. She first saw Dr. King in  for complaints of prolapse being expectantly managed. At that time, she did not mention her bleeding as she attributed this to a \"bladder abrasion\". However, over the subseqent year she continued to have vaginal bleeding. She finally mentioned this to Dr. King last month who obtained a TVUS that showed thickened endometrium and an EMB that showed cancer. She was referred here for management)))    Review of Systems:  Constitutional-- No fever, chills or sweats. No fatigue.  CV-- No chest pain, palpitation or syncope. +HTN, HLD  +cardiac clearance   Resp-- No SOB, cough, hemoptysis  Skin--No rashes or lesions      Allergies:   Allergies   Allergen Reactions   • Motrin [Ibuprofen] Other (See Comments)     ONLY HAS ONE KIDNEY, NO IBUPROFEN   • Wheat Rash   • Amoxicillin Rash   • Penicillins Rash         Home Meds:  Medications Prior to Admission   Medication Sig Dispense Refill Last Dose   • atorvastatin (LIPITOR) 80 MG tablet Take 0.5 tablets by mouth every night at bedtime. (Patient taking differently: Take 40 mg by mouth every night at bedtime. TAKES 1/2 PILL FOR 40MG) 45 tablet 1 2023 at 2100   • Calcium-Magnesium-Vitamin D 600-300-400 liquid Take  by mouth Daily. 2 tbsp daily   2023 at 0800   • fluticasone (FLONASE) 50 MCG/ACT nasal spray 2 sprays into the nostril(s) as directed by provider " Daily As Needed for Rhinitis or Allergies.   2023 at 1800   • hydrocortisone 2.5 % cream As Needed for Irritation or Rash. PRN   Past Week   • Multiple Vitamins-Minerals (MULTI VITAMIN/MINERALS PO) Take 1 tablet by mouth Daily.   2023 at 1400   • ramipril (ALTACE) 10 MG capsule Take 1 capsule by mouth Daily. 90 capsule 1 2023 at 0200   • clopidogrel (PLAVIX) 75 MG tablet Take 1 tablet by mouth Daily. (Patient taking differently: Take 1 tablet by mouth Daily. LAST DOSE WAS 23 PER DR LEBLANC) 90 tablet 1 2023         PMH:   Past Medical History:   Diagnosis Date   • Acquired female bladder prolapse 2022   • Carpal tunnel syndrome     BILATERAL   • Diverticulosis of colon    • Eczema 2018   • Endometrial cancer 2023   • Esophageal reflux    • Female bladder prolapse 2022   • GERD (gastroesophageal reflux disease)    • Hearing decreased, bilateral    • Hematuria, microscopic 2012    W/u by urology was (-)   • Hiatal hernia    • High cholesterol     Started Rx after TIA   • Hypertension    • Hyperthyroidism     Resolved w/o Tx after ~ 6 months   • Osteopenia    • PFO (patent foramen ovale) 10/2016    per echo   • Positive TB test    • Renal oncocytoma of right kidney 2018   • Right kidney mass 2016    found by Dr Cameron Hall   • Single kidney 2018   • TIA (transient ischemic attack) 10/2016   • Wears glasses     READERS     PSH:    Past Surgical History:   Procedure Laterality Date   • BASAL CELL CARCINOMA EXCISION      left temple   • COLONOSCOPY  10/10/2019    Dr Mattson, 5 yr repeat family hx colon ca   • LAPAROSCOPIC CHOLECYSTECTOMY  2012   • LAPAROSCOPIC NEPHRECTOMY Right 2018    Oncocytoma   • WISDOM TOOTH EXTRACTION         Social History:   Tobacco:   Social History     Tobacco Use   Smoking Status Former   • Packs/day: 0.25   • Years: 2.00   • Pack years: 0.50   • Types: Cigarettes   • Quit date:    • Years since quittin.3  "  Smokeless Tobacco Never   Tobacco Comments    2 years only      Alcohol:     Social History     Substance and Sexual Activity   Alcohol Use Yes    Comment: rarely drinks wine         Physical Exam:/84 (BP Location: Right arm, Patient Position: Lying)   Pulse 70   Temp 97.2 °F (36.2 °C) (Temporal)   Resp 18   Ht 153.7 cm (60.51\")   Wt 60.5 kg (133 lb 6.1 oz)   LMP  (LMP Unknown)   SpO2 98%   BMI 25.61 kg/m²       General Appearance:    Alert, cooperative, no distress, appears stated age   Head:    Normocephalic, without obvious abnormality, atraumatic   Lungs:     Clear to auscultation bilaterally, respirations unlabored    Heart:   Regular rate and rhythm, S1 and S2 normal    Abdomen:    Soft without tenderness   Extremities:   Extremities normal, atraumatic, no cyanosis or edema   Skin:   Skin color, texture, turgor normal, no rashes or lesions   Neurologic:   Grossly intact     Results Review:     LABS:  Lab Results   Component Value Date    WBC 4.79 05/02/2023    HGB 14.6 05/02/2023    HCT 45.6 05/02/2023    MCV 91.4 05/02/2023     05/02/2023    NEUTROABS 2.99 05/02/2023    GLUCOSE 109 (H) 05/02/2023    BUN 17 05/02/2023    CREATININE 1.06 (H) 05/02/2023    EGFRIFNONA 49 (L) 03/11/2021     (L) 05/02/2023    K 4.8 05/02/2023    CL 96 (L) 05/02/2023    CO2 27.0 05/02/2023    PHOS 3.0 01/04/2023    CALCIUM 9.6 05/02/2023    ALBUMIN 4.6 05/02/2023    AST 20 05/02/2023    ALT 13 05/02/2023    BILITOT 0.5 05/02/2023       RADIOLOGY:  Imaging Results (Last 72 Hours)     ** No results found for the last 72 hours. **          I reviewed the patient's new clinical results.    Cancer Staging (if applicable)  Cancer Patient: __ yes __no __unknown; If yes, clinical stage T:__ N:__M:__, stage group or __N/A      Impression: Endometrial cancer       Plan: TOTAL LAPAROSCOPIC HYSTERECTOMY BILATERAL SALPINGO-OOPHORECTOMY, INJECTION FOR SENTINEL LYMPH NODE MAPPING, BILATERAL SENTINEL LYMPH NODE " DISSECTION (POSSIBLE FULL LYMPH NODE DISSECTION) WITH DAVINCI ROBOT      Ranjana Peraza, JIMBO   5/9/2023   08:03 EDT

## 2023-05-12 LAB
CYTO UR: NORMAL
LAB AP CASE REPORT: NORMAL
LAB AP CLINICAL INFORMATION: NORMAL
PATH REPORT.FINAL DX SPEC: NORMAL
PATH REPORT.GROSS SPEC: NORMAL

## 2023-05-17 ENCOUNTER — TELEPHONE (OUTPATIENT)
Dept: GYNECOLOGIC ONCOLOGY | Facility: CLINIC | Age: 83
End: 2023-05-17
Payer: MEDICARE

## 2023-05-17 NOTE — TELEPHONE ENCOUNTER
RN returned patient's call and discussed pathology report. Let patient know that per Dr. Kingston, her pathology report showed an endometrial cancer that was confined to the uterus, but her lymph node testing was negative which was great news. Educated that she was stage IB, and that Dr. Kingston will discuss treatment options at her post-op appointment on 5/25. These treatment options include a short course of internal vaginal radiation to decrease the chance of cancer coming back, or just observation and surveillance for now. Patient verbalized understanding and appreciation. RN let patient know she could call back at any time before her post-op appointment with any questions she might have.

## 2023-05-17 NOTE — TELEPHONE ENCOUNTER
Caller: Sheldon Vera    Relationship: Self    Best call back number: [phone]    What was the call regarding: SHELDON WAS RETURNING DR. ANDRAED'S CALL TO DISCUSS HER PATH REPORT.    Do you require a callback: YES

## 2023-05-22 NOTE — PROGRESS NOTES
Post Operative Office Visit      Patient Name: Anabell Vera  : 1940   MRN: [id number]     Chief Complaint:  Postoperative visit    History of Present Illness: Anabell Vera is a 83 y.o. female who is status post Total Laparoscopic Hysterectomy Bilateral Salpingo-oophorectomy, Injection For [place] Lymph Node Mapping, Bilateral [place] Lymph Node Dissection With Davinci Robot on 2023 for endometrial cancer. Her post operative course has been unremarkable and continues to be recovering well. She is tolerating a normal diet. She reports normal return of bowel function. She states her pain is well controlled.     Medical, surgical, and family history updated as needed.  Subjective   ROS as per HPI    Objective     Physical Exam:  Vital Signs:   Vitals:    23 1409   BP: 116/80   Pulse: 56   Resp: 16   Temp: 98.6 °F (37 °C)   TempSrc: Temporal   Weight: 58.4 kg (128 lb 12.8 oz)     BMI: Body mass index is 24.73 kg/m².     Physical Examination:   General appearance - alert, well appearing, and in no distress and oriented to person, place, and time  Mental status - normal mood, behavior, speech, dress, motor activity, and thought processes  Lungs - normal respiratory effort, clear to auscultation  Heart - normal rate, regular rhythm, normal S1, S2, no murmurs, rubs, clicks or gallops  Abdomen - soft, nontender, nondistended, no masses or organomegaly, incision C/D/I and without an evidence of infection  Pelvic - external genitalia normal, vagina without discharge, vaginal cuff intact and without lesions, cervix, uterus and adnexa surgically absent, no palpable masses  Neurological - alert, oriented, normal speech, no focal findings or movement disorder noted  Musculoskeletal - no joint tenderness, deformity or swelling  Extremities - peripheral pulses normal, no pedal edema, no clubbing or cyanosis  Skin - normal coloration and turgor, no rashes, no suspicious skin lesions noted     Medications:      Current Outpatient Medications:   •  acetaminophen (TYLENOL) 325 MG tablet, Take 2 tablets by mouth Every 4 (Four) Hours As Needed for Mild Pain. (Patient not taking: Reported on 5/25/2023), Disp: 60 tablet, Rfl: 0  •  atorvastatin (LIPITOR) 80 MG tablet, Take 0.5 tablets by mouth every night at bedtime. (Patient taking differently: Take 40 mg by mouth every night at bedtime. TAKES 1/2 PILL FOR 40MG), Disp: 45 tablet, Rfl: 1  •  Calcium-Magnesium-Vitamin D 600-300-400 liquid, Take  by mouth Daily. 2 tbsp daily, Disp: , Rfl:   •  clopidogrel (PLAVIX) 75 MG tablet, Take 1 tablet by mouth Daily. (Patient taking differently: Take 1 tablet by mouth Daily. LAST DOSE WAS 5/2/23 PER DR LEBLANC), Disp: 90 tablet, Rfl: 1  •  docusate sodium (COLACE) 250 MG capsule, Take 1 capsule by mouth 2 (Two) Times a Day. Take while requiring narcotics, Disp: 60 capsule, Rfl: 0  •  fluticasone (FLONASE) 50 MCG/ACT nasal spray, 2 sprays into the nostril(s) as directed by provider Daily As Needed for Rhinitis or Allergies., Disp: , Rfl:   •  hydrocortisone 2.5 % cream, As Needed for Irritation or Rash. PRN, Disp: , Rfl:   •  Multiple Vitamins-Minerals (MULTI VITAMIN/MINERALS PO), Take 1 tablet by mouth Daily., Disp: , Rfl:   •  oxyCODONE-acetaminophen (PERCOCET) 5-325 MG per tablet, Take 1 tablet by mouth Every 8 (Eight) Hours As Needed (Pain). (Patient not taking: Reported on 5/25/2023), Disp: 5 tablet, Rfl: 0  •  ramipril (ALTACE) 10 MG capsule, Take 1 capsule by mouth Daily., Disp: 90 capsule, Rfl: 1  Current outpatient and discharge medications have been reconciled for the patient.  Reviewed by: Lindsay Kingston MD      Allergies:   Allergies   Allergen Reactions   • Motrin [Ibuprofen] Other (See Comments)     ONLY HAS ONE KIDNEY, NO IBUPROFEN   • Wheat Rash   • Amoxicillin Rash   • Penicillins Rash       Pathology:   1.  LYMPH NODE, RIGHT PELVIC, SENTINEL NODE EXCISION:  1 lymph node negative for metastatic carcinoma supported by  immunohistochemical stain for cytokeratin JUSTO with appropriate control (0/1).    2.  LYMPH NODE, LEFT PELVIC, SENTINEL NODE EXCISION:  1 lymph node negative for metastatic carcinoma supported by immunohistochemical stain for cytokeratin JUSTO with appropriate control (0/1).    3.  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  No involvement of uterine serosa, cervical stroma, parametrium or bilateral adnexa.  See tumor synoptic for additional details.     Operative findings again reviewed. Pathology report discussed.       Assessment / Plan    Ms Anabell Vera underwent a Total Laparoscopic Hysterectomy Bilateral Salpingo-oophorectomy, Injection For [place] Lymph Node Mapping, Bilateral [place] Lymph Node Dissection With Davinci Robot on 5/9/2023 and is recovering well from surgery. We discussed further recovery and avoiding lifting anything over 10 lbs for 6 weeks postoperatively.     Final pathology showed a grade 2 endometrioid adenocarcinoma with 90% myometrial invasion, negative lymphovascular space invasion and negative lymph nodes. This makes her a FIGO stage IB grade 2 and high intermediate risk of recurrence. I estimate that her risk of recurrence is between 20-25% without additional therapy. Most of those recurrences would occur in the vagina or pelvis. We reviewed the NCCN guidelines which include vaginal brachytherapy or observation.     After discussion of the risks and benefits of each of these treatment options she has decided to proceed with VBT. A referral was placed. I have asked her to see me every 3 months for the first 2 years and every 6 months thereafter. She knows to notify us if she develops any new symptoms such as vaginal bleeding, persistent abdominal or pelvic pain, bloating, or changes in bowel or bladder habits or persistent cough. I    I  spent 15 minutes with the patient in face-to-face discussion and counseling regarding the significance of the pathology report, future treatment options, and surveillance for her newly diagnosed cancer. This is above and beyond her routine post-operative care and separate from the procedure performed.    Follow Up:   No follow-ups on file.    SIMON Kingston MD  Gynecologic Oncology

## 2023-05-25 ENCOUNTER — OFFICE VISIT (OUTPATIENT)
Dept: GYNECOLOGIC ONCOLOGY | Facility: CLINIC | Age: 83
End: 2023-05-25
Payer: MEDICARE

## 2023-05-25 VITALS
SYSTOLIC BLOOD PRESSURE: 116 MMHG | RESPIRATION RATE: 16 BRPM | WEIGHT: 128.8 LBS | HEART RATE: 56 BPM | DIASTOLIC BLOOD PRESSURE: 80 MMHG | BODY MASS INDEX: 24.73 KG/M2 | TEMPERATURE: 98.6 F

## 2023-05-25 DIAGNOSIS — Z09 POSTOP CHECK: Primary | ICD-10-CM

## 2023-05-25 DIAGNOSIS — C54.1 ENDOMETRIAL CANCER: ICD-10-CM

## 2023-05-25 NOTE — LETTER
May 25, 2023     Osman King MD  [address] [facility] [address]  [facility] [address]    Patient: Anabell Vera   YOB: 1940   Date of Visit: 2023       Dear Osman King MD    Anabell Vera was in my office today. Below is a copy of my note.    If you have questions, please do not hesitate to call me. I look forward to following Anabell along with you.         Sincerely,        Lindsay Kingston MD        CC: Alayna PEREZ MD         Post Operative Office Visit      Patient Name: Anabell Vera  : 1940   MRN: [id number]     Chief Complaint:  Postoperative visit    History of Present Illness: Anabell Vera is a 83 y.o. female who is status post Total Laparoscopic Hysterectomy Bilateral Salpingo-oophorectomy, Injection For [place] Lymph Node Mapping, Bilateral [place] Lymph Node Dissection With Davinci Robot on 2023 for endometrial cancer. Her post operative course has been unremarkable and continues to be recovering well. She is tolerating a normal diet. She reports normal return of bowel function. She states her pain is well controlled.     Medical, surgical, and family history updated as needed.  Subjective   ROS as per HPI    Objective     Physical Exam:  Vital Signs:   Vitals:    23 1409   BP: 116/80   Pulse: 56   Resp: 16   Temp: 98.6 °F (37 °C)   TempSrc: Temporal   Weight: 58.4 kg (128 lb 12.8 oz)     BMI: Body mass index is 24.73 kg/m².     Physical Examination:   General appearance - alert, well appearing, and in no distress and oriented to person, place, and time  Mental status - normal mood, behavior, speech, dress, motor activity, and thought processes  Lungs - normal respiratory effort, clear to auscultation  Heart - normal rate, regular rhythm, normal S1, S2, no murmurs, rubs, clicks or gallops  Abdomen - soft, nontender, nondistended, no masses or organomegaly, incision C/D/I and without an evidence of infection  Pelvic - external genitalia normal, vagina  without discharge, vaginal cuff intact and without lesions, cervix, uterus and adnexa surgically absent, no palpable masses  Neurological - alert, oriented, normal speech, no focal findings or movement disorder noted  Musculoskeletal - no joint tenderness, deformity or swelling  Extremities - peripheral pulses normal, no pedal edema, no clubbing or cyanosis  Skin - normal coloration and turgor, no rashes, no suspicious skin lesions noted     Medications:     Current Outpatient Medications:   •  acetaminophen (TYLENOL) 325 MG tablet, Take 2 tablets by mouth Every 4 (Four) Hours As Needed for Mild Pain. (Patient not taking: Reported on 5/25/2023), Disp: 60 tablet, Rfl: 0  •  atorvastatin (LIPITOR) 80 MG tablet, Take 0.5 tablets by mouth every night at bedtime. (Patient taking differently: Take 40 mg by mouth every night at bedtime. TAKES 1/2 PILL FOR 40MG), Disp: 45 tablet, Rfl: 1  •  Calcium-Magnesium-Vitamin D 600-300-400 liquid, Take  by mouth Daily. 2 tbsp daily, Disp: , Rfl:   •  clopidogrel (PLAVIX) 75 MG tablet, Take 1 tablet by mouth Daily. (Patient taking differently: Take 1 tablet by mouth Daily. LAST DOSE WAS 5/2/23 PER DR LEBLANC), Disp: 90 tablet, Rfl: 1  •  docusate sodium (COLACE) 250 MG capsule, Take 1 capsule by mouth 2 (Two) Times a Day. Take while requiring narcotics, Disp: 60 capsule, Rfl: 0  •  fluticasone (FLONASE) 50 MCG/ACT nasal spray, 2 sprays into the nostril(s) as directed by provider Daily As Needed for Rhinitis or Allergies., Disp: , Rfl:   •  hydrocortisone 2.5 % cream, As Needed for Irritation or Rash. PRN, Disp: , Rfl:   •  Multiple Vitamins-Minerals (MULTI VITAMIN/MINERALS PO), Take 1 tablet by mouth Daily., Disp: , Rfl:   •  oxyCODONE-acetaminophen (PERCOCET) 5-325 MG per tablet, Take 1 tablet by mouth Every 8 (Eight) Hours As Needed (Pain). (Patient not taking: Reported on 5/25/2023), Disp: 5 tablet, Rfl: 0  •  ramipril (ALTACE) 10 MG capsule, Take 1 capsule by mouth Daily., Disp: 90  capsule, Rfl: 1  Current outpatient and discharge medications have been reconciled for the patient.  Reviewed by: Lindsay Kingston MD      Allergies:   Allergies   Allergen Reactions   • Motrin [Ibuprofen] Other (See Comments)     ONLY HAS ONE KIDNEY, NO IBUPROFEN   • Wheat Rash   • Amoxicillin Rash   • Penicillins Rash       Pathology:   1.  LYMPH NODE, RIGHT PELVIC, SENTINEL NODE EXCISION:  1 lymph node negative for metastatic carcinoma supported by immunohistochemical stain for cytokeratin JUSTO with appropriate control (0/1).    2.  LYMPH NODE, LEFT PELVIC, SENTINEL NODE EXCISION:  1 lymph node negative for metastatic carcinoma supported by immunohistochemical stain for cytokeratin JUSTO with appropriate control (0/1).    3.  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  No involvement of uterine serosa, cervical stroma, parametrium or bilateral adnexa.  See tumor synoptic for additional details.     Operative findings again reviewed. Pathology report discussed.       Assessment / Plan    Ms Anabell Vera underwent a Total Laparoscopic Hysterectomy Bilateral Salpingo-oophorectomy, Injection For [place] Lymph Node Mapping, Bilateral [place] Lymph Node Dissection With Davinci Robot on 5/9/2023 and is recovering well from surgery. We discussed further recovery and avoiding lifting anything over 10 lbs for 6 weeks postoperatively.     Final pathology showed a grade 2 endometrioid adenocarcinoma with 90% myometrial invasion, negative lymphovascular space invasion and negative lymph nodes. This makes her a FIGO stage IB grade 2 and high intermediate risk of recurrence. I estimate that her risk of recurrence is between 20-25% without additional therapy. Most of those recurrences would occur in the vagina or pelvis. We reviewed the NCCN guidelines which include vaginal  brachytherapy or observation.     After discussion of the risks and benefits of each of these treatment options she has decided to proceed with VBT. A referral was placed. I have asked her to see me every 3 months for the first 2 years and every 6 months thereafter. She knows to notify us if she develops any new symptoms such as vaginal bleeding, persistent abdominal or pelvic pain, bloating, or changes in bowel or bladder habits or persistent cough. I    I spent 15 minutes with the patient in face-to-face discussion and counseling regarding the significance of the pathology report, future treatment options, and surveillance for her newly diagnosed cancer. This is above and beyond her routine post-operative care and separate from the procedure performed.    Follow Up:   No follow-ups on file.    SIMON Kingston MD  Gynecologic Oncology

## 2023-06-15 ENCOUNTER — OFFICE VISIT (OUTPATIENT)
Dept: RADIATION ONCOLOGY | Facility: HOSPITAL | Age: 83
End: 2023-06-15
Payer: MEDICARE

## 2023-06-15 VITALS
BODY MASS INDEX: 24.58 KG/M2 | HEART RATE: 73 BPM | OXYGEN SATURATION: 100 % | HEIGHT: 61 IN | SYSTOLIC BLOOD PRESSURE: 167 MMHG | RESPIRATION RATE: 16 BRPM | DIASTOLIC BLOOD PRESSURE: 87 MMHG | TEMPERATURE: 98.9 F | WEIGHT: 130.2 LBS

## 2023-06-15 DIAGNOSIS — C54.1 ENDOMETRIAL CANCER: Primary | ICD-10-CM

## 2023-06-15 PROCEDURE — G0463 HOSPITAL OUTPT CLINIC VISIT: HCPCS | Performed by: RADIOLOGY

## 2023-06-15 NOTE — PROGRESS NOTES
CONSULTATION NOTE    NAME:      Anabell Vera  :                                                          1940  DATE OF CONSULTATION:                       06/15/23  REQUESTING PHYSICIAN:                   Lindsay Kingston MD  REASON FOR CONSULTATION:           Evaluation and discussion of management options for pathologic T1b N0 M0 FIGO stage Ib endometrial adenocarcinoma of the uterus       BRIEF HISTORY:  Anabell Vera  is a very pleasant 83 y.o. female who underwent total laparoscopic hysterectomy, bilateral salpingo-oophorectomy, sentinel lymph node mapping and dissection with the da Liz robot by Dr. Lindsay Kingston.  Final pathology revealed 0 of 1 positive right pelvic node, 0 of 1 positive left pelvic node and endometrioid adenocarcinoma, FIGO grade 2 that was 90% invasive of the myometrial thickness.  Tumor size was 4.8 x 3.2 x 3 cm.  There was no lymph vascular invasion.  Ms. Vera has recovered well from surgery.  Ms. Vera is here to discuss postoperative radiation.  Her daughter Tabatha Brasher was on the phone during our conversations.    BMI:  Body mass index is 24.6 kg/m².      Social History     Substance and Sexual Activity   Alcohol Use Yes    Comment: rarely drinks wine       Allergies   Allergen Reactions    Motrin [Ibuprofen] Other (See Comments)     ONLY HAS ONE KIDNEY, NO IBUPROFEN    Wheat Rash     Gluten    Amoxicillin Rash    Penicillins Rash       Social History     Tobacco Use    Smoking status: Former     Packs/day: 0.25     Years: 2.00     Pack years: 0.50     Types: Cigarettes     Quit date:      Years since quittin.4    Smokeless tobacco: Never    Tobacco comments:     2 years only   Vaping Use    Vaping Use: Never used   Substance Use Topics    Alcohol use: Yes     Comment: rarely drinks wine    Drug use: No         Past Medical History:   Diagnosis Date    Acquired female bladder prolapse 2022    Carpal tunnel syndrome     BILATERAL    Diverticulosis of colon      Eczema 03/21/2018    Endometrial cancer 03/29/2023    Esophageal reflux     Female bladder prolapse 01/2022    GERD (gastroesophageal reflux disease)     Hearing decreased, bilateral     Hematuria, microscopic 2012    W/u by urology was (-)    Hiatal hernia     High cholesterol 2016    Started Rx after TIA    Hypertension 2016    Hyperthyroidism 1985    Resolved w/o Tx after ~ 6 months    Osteopenia     PFO (patent foramen ovale) 10/2016    per echo    Positive TB test     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    Right kidney mass 2016    found by Dr Cameron Hall    Single kidney 01/26/2018    TIA (transient ischemic attack) 10/2016    Wears glasses     READERS       family history includes Arthritis in her mother; Breast cancer in an other family member; Cancer in her brother, brother, and father; Cervical cancer in her maternal grandmother; Colon cancer in her father; Colon polyps in her brother; Diabetes in her brother, maternal grandmother, mother, sister, and son; Heart attack in her brother and sister; Heart failure in her mother; Multiple sclerosis in her sister; Pneumonia in her father; Prostate cancer in her brother.     Past Surgical History:   Procedure Laterality Date    BASAL CELL CARCINOMA EXCISION  2006    left temple    COLONOSCOPY  10/10/2019    Dr Mattson, 5 yr repeat family hx colon ca    LAPAROSCOPIC CHOLECYSTECTOMY  12/04/2012    LAPAROSCOPIC NEPHRECTOMY Right 01/26/2018    Oncocytoma    TOTAL LAPAROSCOPIC HYSTERECTOMY SALPINGO OOPHORECTOMY N/A 5/9/2023    Procedure: TOTAL LAPAROSCOPIC HYSTERECTOMY BILATERAL SALPINGO-OOPHORECTOMY, INJECTION FOR SENTINEL LYMPH NODE MAPPING, BILATERAL SENTINEL LYMPH NODE DISSECTION WITH [email or web address] [email or web address];  Surgeon: Lindsay Kingston MD;  Location: [facility];  Service: Robotics - DaVinci;  Laterality: N/A;    WISDOM TOOTH EXTRACTION  1959        Review of Systems   Constitutional:  Positive for fatigue.   HENT:   Positive for hearing loss and tinnitus.   "  Genitourinary:  Positive for frequency and nocturia.         Prolapsed bladder, h/o nephrectomy for benign mass   Psychiatric/Behavioral:  Positive for sleep disturbance.    All other systems reviewed and are negative.        Objective   VITAL SIGNS:   Vitals:    06/15/23 0913   BP: 167/87   Pulse: 73   Resp: 16   Temp: 98.9 °F (37.2 °C)   TempSrc: Temporal   SpO2: 100%   Weight: 59.1 kg (130 lb 3.2 oz)   Height: 154.9 cm (61\")   PainSc: 0-No pain        KPS       90%    Physical Exam  Vitals reviewed.   Constitutional:       Appearance: Normal appearance. She is normal weight.   Cardiovascular:      Rate and Rhythm: Normal rate.   Pulmonary:      Effort: Pulmonary effort is normal.   Abdominal:      General: Abdomen is flat. There is no distension.      Tenderness: There is no abdominal tenderness.      Comments: Incisions are well-healed   Neurological:      Mental Status: She is alert.   The pelvic exam reveals normal external genitalia.  Bimanual exam reveals the vaginal apex is healing well.  There is granulation tissue and sutures in place.         The following portions of the patient's history were reviewed and updated as appropriate: allergies, current medications, past family history, past medical history, past social history, past surgical history, and problem list.    Assessment      IMPRESSION:    Anabell Vera  is a very pleasant 83 y.o. female who underwent total laparoscopic hysterectomy, bilateral salpingo-oophorectomy, sentinel lymph node mapping and dissection with the da Liz robot by Dr. Lindsay Kingston.  Final pathology revealed 0 of 1 positive right pelvic node, 0 of 1 positive left pelvic node and endometrioid adenocarcinoma, FIGO grade 2 that was 90% invasive of the myometrial thickness.  Tumor size was 4.8 x 3.2 x 3 cm.  There was no lymph vascular invasion.  Ms. Vera is here to discuss postoperative radiation.      RECOMMENDATIONS: Reviewing NCCN guidelines for this high intermediate risk " cancer the recommendations for FIGO stage IB grade 2 endometrial cancer are vaginal brachytherapy preferred or consideration of external beam radiation if over 60 years old and or lymphovascular invasion.  I discussed the pros and cons, risks and benefits of both vaginal cylinder brachytherapy and external beam radiation of 45 Fierro in 25 fractions.  She understands both are for local control only with no survival benefit.  She would like to undergo vaginal brachytherapy alone and I agree.  Informed consent was obtained and an appointment made for her to return to our department for treatment planning.  We will treat the upper vagina to 30 Fierro in 5 fractions.  Thank you for asking me to see Ms. Vera.                   Patty Hyde MD    Total time of patient care on day of service including time prior to, face to face with patient, and following visit spent in reviewing records, lab results, imaging studies, discussion with patient, and documentation/charting was > 45 minutes.    Errors in dictation may reflect use of voice recognition software and not all errors in transcription may have been detected prior to signing.

## 2023-07-31 ENCOUNTER — OFFICE VISIT (OUTPATIENT)
Dept: GYNECOLOGIC ONCOLOGY | Facility: CLINIC | Age: 83
End: 2023-07-31
Payer: MEDICARE

## 2023-07-31 ENCOUNTER — TELEPHONE (OUTPATIENT)
Dept: RADIATION ONCOLOGY | Facility: HOSPITAL | Age: 83
End: 2023-07-31
Payer: MEDICARE

## 2023-07-31 VITALS
BODY MASS INDEX: 25.02 KG/M2 | HEART RATE: 72 BPM | RESPIRATION RATE: 17 BRPM | TEMPERATURE: 98 F | DIASTOLIC BLOOD PRESSURE: 76 MMHG | HEIGHT: 61 IN | SYSTOLIC BLOOD PRESSURE: 186 MMHG | OXYGEN SATURATION: 99 % | WEIGHT: 132.5 LBS

## 2023-07-31 DIAGNOSIS — Z09 POSTOP CHECK: Primary | ICD-10-CM

## 2023-07-31 PROCEDURE — 3077F SYST BP >= 140 MM HG: CPT | Performed by: OBSTETRICS & GYNECOLOGY

## 2023-07-31 PROCEDURE — 3078F DIAST BP <80 MM HG: CPT | Performed by: OBSTETRICS & GYNECOLOGY

## 2023-07-31 PROCEDURE — 99024 POSTOP FOLLOW-UP VISIT: CPT | Performed by: OBSTETRICS & GYNECOLOGY

## 2023-07-31 PROCEDURE — 1126F AMNT PAIN NOTED NONE PRSNT: CPT | Performed by: OBSTETRICS & GYNECOLOGY

## 2023-08-07 ENCOUNTER — TELEPHONE (OUTPATIENT)
Dept: RADIATION ONCOLOGY | Facility: HOSPITAL | Age: 83
End: 2023-08-07
Payer: MEDICARE

## 2023-08-07 NOTE — TELEPHONE ENCOUNTER
Called patient to change appointment times to Re-Evaluation with Dr. Hyde at 2:00 pm, same date (8/9/23), with CT Sim to follow at 3:00 pm.  Patient verbalized an understanding of time change.

## 2023-08-09 ENCOUNTER — OFFICE VISIT (OUTPATIENT)
Dept: RADIATION ONCOLOGY | Facility: HOSPITAL | Age: 83
End: 2023-08-09
Payer: MEDICARE

## 2023-08-09 ENCOUNTER — HOSPITAL ENCOUNTER (OUTPATIENT)
Dept: RADIATION ONCOLOGY | Facility: HOSPITAL | Age: 83
Discharge: HOME | End: 2023-08-09
Payer: MEDICARE

## 2023-08-09 ENCOUNTER — HOSPITAL ENCOUNTER (OUTPATIENT)
Dept: RADIATION ONCOLOGY | Facility: HOSPITAL | Age: 83
Setting detail: RADIATION/ONCOLOGY SERIES
Discharge: HOME | End: 2023-08-09
Payer: MEDICARE

## 2023-08-09 VITALS
HEIGHT: 62 IN | WEIGHT: 132.5 LBS | TEMPERATURE: 99.1 F | BODY MASS INDEX: 24.38 KG/M2 | HEART RATE: 68 BPM | RESPIRATION RATE: 20 BRPM | OXYGEN SATURATION: 96 % | SYSTOLIC BLOOD PRESSURE: 130 MMHG | DIASTOLIC BLOOD PRESSURE: 65 MMHG

## 2023-08-09 DIAGNOSIS — C54.1 ENDOMETRIAL CANCER: Primary | ICD-10-CM

## 2023-08-09 PROCEDURE — 77332 RADIATION TREATMENT AID(S): CPT | Performed by: RADIOLOGY

## 2023-08-09 PROCEDURE — 77290 THER RAD SIMULAJ FIELD CPLX: CPT | Performed by: RADIOLOGY

## 2023-08-09 PROCEDURE — G0463 HOSPITAL OUTPT CLINIC VISIT: HCPCS

## 2023-08-09 NOTE — PROGRESS NOTES
"RE-EVALUATION    PATIENT:                                                      Anabell Vera  :                                                          1940  DATE:                          2023   DIAGNOSIS:     Pathologic T1b N0 M0 FIGO stage Ib endometrial adenocarcinoma of the uterus        Subjective      BRIEF HISTORY:  Anabell Vera  is a very pleasant 83 y.o. female who underwent total laparoscopic hysterectomy, bilateral salpingo-oophorectomy, sentinel lymph node mapping and dissection with the da Liz robot by Dr. Lindsay Kingston.  Final pathology revealed 0 of 1 positive right pelvic node, 0 of 1 positive left pelvic node and endometrioid adenocarcinoma, FIGO grade 2 that was 90% invasive of the myometrial thickness.  Tumor size was 4.8 x 3.2 x 3 cm.  There was no lymph vascular invasion.  She was noted to have a superficial vaginal cuff separation on exam.  She saw Dr. Lindsay Kingston and has been cleared for vaginal brachytherapy as she is completely healed.        Allergies   Allergen Reactions    Motrin [Ibuprofen] Other (See Comments)     ONLY HAS ONE KIDNEY, NO IBUPROFEN    Wheat Rash     Gluten    Amoxicillin Rash    Penicillins Rash       Review of Systems   Respiratory:  Positive for cough (allergy related).    Genitourinary:  Positive for frequency and nocturia.    All other systems reviewed and are negative.        Objective   VITAL SIGNS:   Vitals:    23 1354   BP: 130/65   Pulse: 68   Resp: 20   Temp: 99.1 øF (37.3 øC)   TempSrc: Temporal   SpO2: 96%   Weight: 60.1 kg (132 lb 8 oz)   Height: 157.5 cm (62\")   PainSc: 0-No pain        Karnofsky score: 80   {KPS:  **    Physical Exam  Vitals reviewed.   Constitutional:       Appearance: Normal appearance. She is normal weight.   Cardiovascular:      Rate and Rhythm: Normal rate.   Pulmonary:      Effort: Pulmonary effort is normal.   Neurological:      Mental Status: She is alert.   The pelvic exam reveals normal external genitalia.  " The vaginal cuff is well-healed.  She has a prolapsed bladder.  For treatment the cylinder will have to be positioned carefully posteriorly.         The following portions of the patient's history were reviewed and updated as appropriate: allergies, current medications, past family history, past medical history, past social history, past surgical history, and problem list.    Diagnoses and all orders for this visit:    Endometrial cancer    Other orders  -     ASPIRIN 81 PO; Daily.      IMPRESSION:   Anabell Vera  is an 83 y.o. female who underwent total laparoscopic hysterectomy, bilateral salpingo-oophorectomy, sentinel lymph node mapping and dissection with the da Liz robot by Dr. Lindsay Kingston.  Final pathology revealed 0 of 1 positive right pelvic node, 0 of 1 positive left pelvic node and endometrioid adenocarcinoma, FIGO grade 2 that was 90% invasive of the myometrial thickness.  Tumor size was 4.8 x 3.2 x 3 cm.  There was no lymph vascular invasion.  She has healed well from a vaginal cuff separation and is ready to proceed with postoperative radiation.    RECOMMENDATIONS:   Reviewing NCCN guidelines for this high intermediate risk cancer the recommendations for FIGO stage IB grade 2 endometrial cancer are vaginal brachytherapy preferred or consideration of external beam radiation if over 60 years old and or lymphovascular invasion.  I discussed the pros and cons, risks and benefits of both vaginal cylinder brachytherapy and external beam radiation of 45 Fierro in 25 fractions.  She understands both are for local control only with no survival benefit.  She would like to undergo vaginal brachytherapy alone and I agree.  Informed consent was previously obtained.  We will proceed with treatment planning today and she was given a schedule for [id number] the upper vagina to 30 Gray in 5 fractions.          Patty Hyde MD    Total time of patient care on day of service including time prior to, face to face  with patient, and following visit spent in reviewing records, lab results, imaging studies, discussion with patient, and documentation/charting was > 45 minutes.

## 2023-08-10 DIAGNOSIS — I63.9 ACUTE CVA (CEREBROVASCULAR ACCIDENT): ICD-10-CM

## 2023-08-10 DIAGNOSIS — E78.5 HYPERLIPIDEMIA, UNSPECIFIED HYPERLIPIDEMIA TYPE: ICD-10-CM

## 2023-08-10 RX ORDER — ATORVASTATIN CALCIUM 80 MG/1
40 TABLET, FILM COATED ORAL
Qty: 45 TABLET | Refills: 0 | Status: SHIPPED | OUTPATIENT
Start: 2023-08-10

## 2023-08-10 RX ORDER — CLOPIDOGREL BISULFATE 75 MG/1
75 TABLET ORAL DAILY
Qty: 90 TABLET | Refills: 0 | Status: SHIPPED | OUTPATIENT
Start: 2023-08-10

## 2023-08-10 NOTE — TELEPHONE ENCOUNTER
Caller: Anabell Vera    Relationship: Self    Best call back number: [phone]     Requested Prescriptions:   Requested Prescriptions     Pending Prescriptions Disp Refills    clopidogrel (PLAVIX) 75 MG tablet 90 tablet 1     Sig: Take 1 tablet by mouth Daily.    atorvastatin (LIPITOR) 80 MG tablet 45 tablet 1     Sig: Take 0.5 tablets by mouth every night at bedtime.        Pharmacy where request should be sent:      Last office visit with prescribing clinician: 1/4/2023   Last telemedicine visit with prescribing clinician: Visit date not found   Next office visit with prescribing clinician: Visit date not found         Does the patient have less than a 3 day supply:  [] Yes  [x] No    Would you like a call back once the refill request has been completed: [] Yes [x] No    If the office needs to give you a call back, can they leave a voicemail: [] Yes [x] No    Demetris Fernandez Rep   08/10/23 15:27 EDT

## 2023-08-11 PROCEDURE — 77295 3-D RADIOTHERAPY PLAN: CPT | Performed by: RADIOLOGY

## 2023-08-15 ENCOUNTER — PROCEDURE VISIT (OUTPATIENT)
Dept: RADIATION ONCOLOGY | Facility: HOSPITAL | Age: 83
End: 2023-08-15
Payer: MEDICARE

## 2023-08-15 ENCOUNTER — HOSPITAL ENCOUNTER (OUTPATIENT)
Dept: RADIATION ONCOLOGY | Facility: HOSPITAL | Age: 83
Discharge: HOME | End: 2023-08-15
Payer: MEDICARE

## 2023-08-15 DIAGNOSIS — C54.1 ENDOMETRIAL CANCER: Primary | ICD-10-CM

## 2023-08-15 PROCEDURE — C1717 BRACHYTX, NON-STR,HDR IR-192: HCPCS | Performed by: RADIOLOGY

## 2023-08-15 PROCEDURE — 57156 INS VAG BRACHYTX DEVICE: CPT | Performed by: RADIOLOGY

## 2023-08-15 PROCEDURE — 77770 HDR RDNCL NTRSTL/ICAV BRCHTX: CPT | Performed by: RADIOLOGY

## 2023-08-15 NOTE — PROGRESS NOTES
08/15/2023  Anabell Vera    HDR#   1    Patient presents for cylinder brachytherapy.  She has no complaints of urinary discomfort, diarrhea or vaginal irritation.  The patient was positioned in the supine position on the treatment table.  A simple simulation was performed today confirming the correct size, position, and insertion of the vaginal cylinder.  A 2.5 cm cylinder was then inserted to the pre-specified depth.  Our Medical Physicist performed pretreatment surveys, calibrated the treatment plan based on today's dose rate, and confirmed correct placement and connection of the applicator to the HDR afterloader.   Treatment of 6 Gy to the surface of the upper vaginal mucosa was delivered. The Medical Physicist and the Radiation Oncologist remained present at the console for the entire duration of brachytherapy administration.  There were no immediate events, and the patient tolerated the procedure well with no complications.  Physics survey was negative with no residual radioactivity.  The patient was then discharged to home in good condition.  She will return for next treatment.

## 2023-08-18 ENCOUNTER — PROCEDURE VISIT (OUTPATIENT)
Dept: RADIATION ONCOLOGY | Facility: HOSPITAL | Age: 83
End: 2023-08-18
Payer: MEDICARE

## 2023-08-18 ENCOUNTER — HOSPITAL ENCOUNTER (OUTPATIENT)
Dept: RADIATION ONCOLOGY | Facility: HOSPITAL | Age: 83
Discharge: HOME | End: 2023-08-18
Payer: MEDICARE

## 2023-08-18 DIAGNOSIS — C54.1 ENDOMETRIAL CANCER: Primary | ICD-10-CM

## 2023-08-18 PROCEDURE — 57156 INS VAG BRACHYTX DEVICE: CPT | Performed by: RADIOLOGY

## 2023-08-18 PROCEDURE — 77770 HDR RDNCL NTRSTL/ICAV BRCHTX: CPT | Performed by: RADIOLOGY

## 2023-08-18 PROCEDURE — C1717 BRACHYTX, NON-STR,HDR IR-192: HCPCS | Performed by: RADIOLOGY

## 2023-08-18 NOTE — PROGRESS NOTES
08/18/2023  Anabell Vera    HDR#   2    Patient presents for cylinder brachytherapy.  She has no complaints of urinary discomfort, diarrhea or vaginal irritation.  The patient was positioned in the supine position on the treatment table.  A simple simulation was performed today confirming the correct size, position, and insertion of the vaginal cylinder.  A 2.5 cm cylinder was then inserted to the pre-specified depth.  Our Medical Physicist performed pretreatment surveys, calibrated the treatment plan based on today's dose rate, and confirmed correct placement and connection of the applicator to the HDR afterloader.   Treatment of 6 Gy to the surface of the upper vaginal mucosa was delivered. The Medical Physicist and the Radiation Oncologist remained present at the console for the entire duration of brachytherapy administration.  There were no immediate events, and the patient tolerated the procedure well with no complications.  Physics survey was negative with no residual radioactivity.  The patient was then discharged to home in good condition.  She will return for next treatment.

## 2023-08-21 ENCOUNTER — HOSPITAL ENCOUNTER (OUTPATIENT)
Dept: RADIATION ONCOLOGY | Facility: HOSPITAL | Age: 83
Setting detail: RADIATION/ONCOLOGY SERIES
Discharge: HOME | End: 2023-08-21
Payer: MEDICARE

## 2023-08-21 ENCOUNTER — APPOINTMENT (OUTPATIENT)
Dept: RADIATION ONCOLOGY | Facility: HOSPITAL | Age: 83
End: 2023-08-21
Payer: MEDICARE

## 2023-08-21 PROCEDURE — C1717 BRACHYTX, NON-STR,HDR IR-192: HCPCS | Performed by: RADIOLOGY

## 2023-08-21 PROCEDURE — 77770 HDR RDNCL NTRSTL/ICAV BRCHTX: CPT | Performed by: RADIOLOGY

## 2023-08-21 PROCEDURE — 57156 INS VAG BRACHYTX DEVICE: CPT | Performed by: RADIOLOGY

## 2023-08-21 NOTE — PROGRESS NOTES
08/21/2023  Anabell Vera    HDR#   3    Patient presents for cylinder brachytherapy.  She has no complaints of urinary discomfort, diarrhea or vaginal irritation.  The patient was positioned in the supine position on the treatment table.  A simple simulation was performed today confirming the correct size, position, and insertion of the vaginal cylinder.  A 2.5 cm cylinder was then inserted to the pre-specified depth.  Our Medical Physicist performed pretreatment surveys, calibrated the treatment plan based on today's dose rate, and confirmed correct placement and connection of the applicator to the HDR afterloader.   Treatment of 6 Gy to the surface of the upper vaginal mucosa was delivered. The Medical Physicist and the Radiation Oncologist remained present at the console for the entire duration of brachytherapy administration.  There were no immediate events, and the patient tolerated the procedure well with no complications.  Physics survey was negative with no residual radioactivity.  The patient was then discharged to home in good condition.  She will return for next treatment.

## 2023-08-24 ENCOUNTER — APPOINTMENT (OUTPATIENT)
Dept: RADIATION ONCOLOGY | Facility: HOSPITAL | Age: 83
End: 2023-08-24
Payer: MEDICARE

## 2023-08-24 ENCOUNTER — HOSPITAL ENCOUNTER (OUTPATIENT)
Dept: RADIATION ONCOLOGY | Facility: HOSPITAL | Age: 83
Setting detail: RADIATION/ONCOLOGY SERIES
Discharge: HOME | End: 2023-08-24
Payer: MEDICARE

## 2023-08-24 PROCEDURE — C1717 BRACHYTX, NON-STR,HDR IR-192: HCPCS | Performed by: RADIOLOGY

## 2023-08-24 PROCEDURE — 57156 INS VAG BRACHYTX DEVICE: CPT | Performed by: RADIOLOGY

## 2023-08-24 PROCEDURE — 77770 HDR RDNCL NTRSTL/ICAV BRCHTX: CPT | Performed by: RADIOLOGY

## 2023-08-24 NOTE — PROGRESS NOTES
08/24/2023  Anabell Vera    HDR#   4    Patient presents for cylinder brachytherapy.  She has no complaints of urinary discomfort, diarrhea or vaginal irritation. She has insect bites on the upper thigh from recent picnic.  The patient was positioned in the supine position on the treatment table.  A simple simulation was performed today confirming the correct size, position, and insertion of the vaginal cylinder.  A 2.5 cm cylinder was then inserted to the pre-specified depth.  Our Medical Physicist performed pretreatment surveys, calibrated the treatment plan based on today's dose rate, and confirmed correct placement and connection of the applicator to the HDR afterloader.   Treatment of 6 Gy to the surface of the upper vaginal mucosa was delivered. The Medical Physicist and the Radiation Oncologist remained present at the console for the entire duration of brachytherapy administration.  There were no immediate events, and the patient tolerated the procedure well with no complications.  Physics survey was negative with no residual radioactivity.  The patient was then discharged to home in good condition.  She will return for next treatment.

## 2023-08-28 ENCOUNTER — HOSPITAL ENCOUNTER (OUTPATIENT)
Dept: RADIATION ONCOLOGY | Facility: HOSPITAL | Age: 83
Setting detail: RADIATION/ONCOLOGY SERIES
Discharge: HOME | End: 2023-08-28
Payer: MEDICARE

## 2023-08-28 ENCOUNTER — APPOINTMENT (OUTPATIENT)
Dept: RADIATION ONCOLOGY | Facility: HOSPITAL | Age: 83
End: 2023-08-28
Payer: MEDICARE

## 2023-08-28 PROCEDURE — 77336 RADIATION PHYSICS CONSULT: CPT | Performed by: RADIOLOGY

## 2023-08-28 PROCEDURE — C1717 BRACHYTX, NON-STR,HDR IR-192: HCPCS | Performed by: RADIOLOGY

## 2023-08-28 PROCEDURE — 77770 HDR RDNCL NTRSTL/ICAV BRCHTX: CPT | Performed by: RADIOLOGY

## 2023-08-28 PROCEDURE — 57156 INS VAG BRACHYTX DEVICE: CPT | Performed by: RADIOLOGY

## 2023-08-28 NOTE — RADIATION COMPLETION NOTES
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 female who underwent total laparoscopic hysterectomy, bilateral salpingo-oophorectomy, sentinel lymph node mapping and dissection with the da Liz robot by Dr. Lindsay Kingston.  Final pathology revealed 0 of 1 positive right pelvic node, 0 of 1 positive left pelvic node and endometrioid adenocarcinoma, FIGO grade 2 that was 90% invasive of the myometrial thickness.  Tumor size was 4.8 x 3.2 x 3 cm.  There was no lymph vascular invasion.  She was noted to have a superficial vaginal cuff separation on exam.  She saw Dr. Lindsay Kingston and has been cleared for vaginal brachytherapy as she is completely healed.     She received radiotherapy in our department as follows:    TREATMENT COURSE:   8/ the surface of the upper vagina received 30 Gray in 5 fractions utilizing a vaginal cylinder and iridium 192      TOLERANCE:   Ms. Vera tolerated her treatments without difficulty.  She had no vaginal irritation or complaints.      DISPOSITION:  At the completion of therapy an appointment was made for her to return on 10/10/2023 at 9:30 AM.  She knows to call if she has any problems sooner.        Patty Hyde MD    Dictated using dragon dictation

## 2023-08-28 NOTE — PROGRESS NOTES
08/28/2023  Anabell Vera    HDR#   5    Patient presents for cylinder brachytherapy.  She has no complaints of urinary discomfort, diarrhea or vaginal irritation.  The patient was positioned in the supine position on the treatment table.  A simple simulation was performed today confirming the correct size, position, and insertion of the vaginal cylinder.  A 2.5 cm cylinder was then inserted to the pre-specified depth.  Our Medical Physicist performed pretreatment surveys, calibrated the treatment plan based on today's dose rate, and confirmed correct placement and connection of the applicator to the HDR afterloader.   Treatment of 6 Gy to the surface of the upper vaginal mucosa was delivered. The Medical Physicist and the Radiation Oncologist remained present at the console for the entire duration of brachytherapy administration.  There were no immediate events, and the patient tolerated the procedure well with no complications.  Physics survey was negative with no residual radioactivity.  The patient was then discharged to home in good condition.  She will return to Radiation Oncology in the next few weeks for follow up.

## 2023-09-16 NOTE — TELEPHONE ENCOUNTER
Tried to call Ms. Vera regarding pathology. No answer. VM left asking her to return our call.    Electronically signed by Lindsay Kingston MD, 05/17/23, 9:54 AM EDT.    
complains of pain/discomfort

## 2023-10-05 ENCOUNTER — TELEPHONE (OUTPATIENT)
Dept: RADIATION ONCOLOGY | Facility: HOSPITAL | Age: 83
End: 2023-10-05
Payer: MEDICARE

## 2023-10-05 NOTE — TELEPHONE ENCOUNTER
Patient called to report small amount of bleeding X 4 weeks and mucous discharge X 1.5 weeks  post Brachytherapy.  Patient has a F/U with Rosenda on Tuesday, 10/10/23 and plans to discuss during the visit.  Will discuss patient symptoms with Rosenda prior to appointment and will let patient know if there is anything that she needs to do in preparation for upcoming visit.

## 2023-10-10 ENCOUNTER — HOSPITAL ENCOUNTER (OUTPATIENT)
Dept: RADIATION ONCOLOGY | Facility: HOSPITAL | Age: 83
Setting detail: RADIATION/ONCOLOGY SERIES
Discharge: HOME | End: 2023-10-10
Payer: MEDICARE

## 2023-10-10 ENCOUNTER — OFFICE VISIT (OUTPATIENT)
Dept: RADIATION ONCOLOGY | Facility: HOSPITAL | Age: 83
End: 2023-10-10
Payer: MEDICARE

## 2023-10-10 VITALS
BODY MASS INDEX: 24.91 KG/M2 | DIASTOLIC BLOOD PRESSURE: 72 MMHG | TEMPERATURE: 97 F | RESPIRATION RATE: 16 BRPM | HEART RATE: 84 BPM | WEIGHT: 136.2 LBS | OXYGEN SATURATION: 97 % | SYSTOLIC BLOOD PRESSURE: 134 MMHG

## 2023-10-10 DIAGNOSIS — C54.1 ENDOMETRIAL CANCER: Primary | ICD-10-CM

## 2023-10-10 PROCEDURE — G0463 HOSPITAL OUTPT CLINIC VISIT: HCPCS

## 2023-10-10 NOTE — PROGRESS NOTES
FOLLOW UP NOTE    PATIENT:                                                      Anabell Vera  MEDICAL RECORD #:                        [id number]  :                                                          1940  COMPLETION DATE:   2023  DIAGNOSIS:     Endometrial adenocarcinoma of the uterus    FIGO Stage IB (pT1b, pN0, cM0)      BRIEF HISTORY:  Anabell Vera is a very pleasant 83 y.o. female who presents today for initial follow-up of her recently diagnosed uterine cancer.  She underwent TLH, BSO, sentinel lymph node mapping and dissection on 2023 with Dr. Kingston.  Final pathology revealed 4.8 x 3.2 x 3 cm endometrioid adenocarcinoma, FIGO grade 2 with 90% myometrial invasion.  There was no lymphovascular invasion.  0/1 right pelvic node was positive for involvement and 0/1 left pelvic node was positive for involvement.  Postoperatively, she developed some bleeding and was found to have a superficial vaginal cuff separation on exam.  Once this was healed, she underwent a course of adjuvant vaginal brachytherapy alone to a dose of 30 Gray in 5 fractions delivered to the upper vagina, completing 2023.  The patient tolerated treatment well.  Following treatment, she describes an approximate 4-week duration of scant bright red blood on the toilet tissue after urination, which gradually faded to pink-tinged and has since resolved.  She also describes an approximate 1.5-week duration of some odorless vaginal discharge which she describes as mucous-like and whitish-yellow.  She reports vaginal discharge has resolved.  She is not sexually active.  She denies complaints of vulvar irritation or vaginal pain.  She denies dysuria, flank pain, abdominopelvic pains, fever or chills.  She describes urine as pale, clear, without hematuria noted in the toilet bowl.  She does have a history of prolapsed bladder and reports some increased urge urinary incontinence following treatment, requiring increased pad use.   She denies diarrhea, constipation, or change in bowel habits.  No dyspnea or unusual cough.  She feels like she is making a slow but gradual recovery.  Energy level is improving.  She denies additional acute concerns today.      MEDICATIONS: Medication reconciliation for the patient was reviewed and confirmed in the electronic medical record.    Review of Systems   Respiratory:  Positive for cough (seasonal allergies).    Genitourinary:  Positive for bladder incontinence (worsening UUI), frequency, nocturia, vaginal bleeding (reports none recently, has gradually subsided) and vaginal discharge (reports none recently, has gradually subsided).    All other systems reviewed and are negative.    KPS 80%          Physical Exam  Vitals and nursing note reviewed.   Constitutional:       General: She is not in acute distress.     Appearance: She is well-developed.   HENT:      Head: Normocephalic and atraumatic.   Eyes:      Conjunctiva/sclera: Conjunctivae normal.      Pupils: Pupils are equal, round, and reactive to light.   Cardiovascular:      Rate and Rhythm: Normal rate and regular rhythm.      Heart sounds: No murmur heard.     No friction rub.   Pulmonary:      Effort: Pulmonary effort is normal.      Breath sounds: Normal breath sounds. No wheezing.   Abdominal:      General: Bowel sounds are normal. There is no distension.      Palpations: Abdomen is soft. There is no mass.      Tenderness: There is no abdominal tenderness.   Genitourinary:     Comments: Exam reveals normal external genitalia without lesion or excoriation.  Speculum exam reveals cystocele.  Vagina appears atrophic with mild telangiectasias noted along vaginal vault with friable tissue noted posteriorly at the vaginal apex consistent with previous radiation, without identifiable lesion or ulceration.  Vaginal cuff appears smooth and well-healed.  No evidence of blood or unusual vaginal discharge with insertion or retraction of speculum or on gloved  finger.  Bimanual exam reveals no palpable masses or tenderness.  Exam was well-tolerated.   Musculoskeletal:         General: Normal range of motion.      Cervical back: Normal range of motion and neck supple.   Lymphadenopathy:      Cervical: No cervical adenopathy.   Skin:     General: Skin is warm and dry.   Neurological:      Mental Status: She is alert and oriented to person, place, and time.   Psychiatric:         Behavior: Behavior normal.         Thought Content: Thought content normal.         Judgment: Judgment normal.         VITAL SIGNS:   Vitals:    10/10/23 1029   BP: 134/72   Pulse: 84   Resp: 16   Temp: 97 øF (36.1 øC)   TempSrc: Temporal   SpO2: 97%   Weight: 61.8 kg (136 lb 3.2 oz)   PainSc: 0-No pain                The following portions of the patient's history were reviewed and updated as appropriate: allergies, current medications, past family history, past medical history, past social history, past surgical history and problem list.         Diagnoses and all orders for this visit:    1. Endometrial cancer (Primary)  -     Ambulatory Referral to Physical Therapy Pelvic Floor         IMPRESSION:  Anabell Vera is an 83 y.o. female with recent diagnosis of a pT1b, pN0, cM0 FIGO stage IB grade 2 endometrioid adenocarcinoma.  Following hysterectomy and staging, she did develop superficial vaginal cuff separation which was allowed to appropriately heal prior to proceeding with adjuvant vaginal cuff brachytherapy, which she completed 6 weeks ago.  She tolerated treatment well.  Clinical findings today are without evidence of recurrent disease, but reveal some mild vascular changes and friable tissue secondary to radiation which were the likely source of her scant vaginal bleeding which has subsided.  We discussed that should bleeding persist or recur, we can evaluate with CT scans at that time.  She is otherwise asymptomatic except for some increased urge urinary incontinence in the setting of chronic  cystocele.  We discussed referral to pelvic floor physical therapy, which the patient was agreeable.  Referral placed.  Reviewed bladder training and timed voiding.  We also discussed potential benefit of a pessary.  The patient reports that she previously discussed this with her regular gynecologist and plans to further consider this option once she feels that she has made more of a recovery from her cancer treatments.  I am happy to place urology or urogynecology referral if the patient prefers.  The patient and I discussed the use of a vaginal dilator, which was provided to the patient along with printed instructions for use and samples of lubrication.  The patient and I reviewed follow-up intervals, which will alternate every 3 months between the GYN oncology clinic and our clinic for the first 2 years.  We also reviewed the role of serial clinical exams.  The patient continues routine surveillance under the care of Dr. Kingston, with survivorship visit scheduled 11/2/2023.  We will schedule the patient to return to our clinic about 3 months thereafter.  She was instructed to notify her cancer team for development of any new symptoms such as vaginal bleeding, persistent abdominal or pelvic pain, bloating, changes in bowel or bladder habits, persistent cough or dyspnea.      RECOMMENDATIONS:  Anabell Vera remains under the care of Dr. Kingston with upcoming follow-up visit.  We will schedule the patient to return to our clinic in 2/2024 for ongoing surveillance, or sooner as needed.    Return in about 4 months (around 2/12/2024) for Office Visit.    JIMBO Chun spent a total of 45 minutes on today's visit, with more than 25 minutes in direct face to face communication, and the remainder of the time spent in reviewing the relevant history, records, available imaging, and for documentation.

## 2023-10-15 DIAGNOSIS — K04.7 DENTAL ABSCESS: ICD-10-CM

## 2023-10-16 RX ORDER — CLINDAMYCIN HYDROCHLORIDE 300 MG/1
CAPSULE ORAL
Qty: 21 CAPSULE | Refills: 0 | OUTPATIENT
Start: 2023-10-16

## 2023-10-18 ENCOUNTER — TREATMENT (OUTPATIENT)
Dept: PHYSICAL THERAPY | Facility: CLINIC | Age: 83
End: 2023-10-18
Payer: MEDICARE

## 2023-10-18 DIAGNOSIS — N81.11 CYSTOCELE, MIDLINE: ICD-10-CM

## 2023-10-18 DIAGNOSIS — M62.81 MUSCLE WEAKNESS: Primary | ICD-10-CM

## 2023-10-18 NOTE — PROGRESS NOTES
Physical Therapy Initial Evaluation and Plan of Care  PHYSICAL THERAPY    [address], [address]; [place] [address]    Patient: Anabell Vera   : 1940  Diagnosis/ICD-10 Code:  Muscle weakness [M62.81]  Referring practitioner: JIMBO Chun  Date of Initial Visit: 10/18/2023  Today's Date: 10/20/2023  Patient seen for 1 sessions      Subjective    Had uterine cancer and everything and had a 6 week checkup after radiation. And referred her for her prolapse. This she had a history of prior to surgery. Some bleeding of her prolapse where she can see it. Having a harder time getting to the bathroom.       Chief Complaint:   Chief Complaint   Patient presents with    Initial Evaluation      Functional Outcome Measure: PFDI-20 score: 23.6%    Pain  Since having hysterectomy, no pain - just feels different internally      Bladder function:  Was wearing a pad for leakage that started after had children, with prolapse had to go to [employer]  Incontinence: only having leakage once she stands up  Urination at night: every 2 hours depending on what drinks   Complete bladder voiding: feels emptying  Frequency of urination: every 3-4 hours  Discomfort with urination: denies   Fluid irritants consumed daily: a lot of juice - VQ, cranberry juice, orange fruit juice, coffee in AM, tries to avoid tea; has 1 kidney and has to drink a lot of water; 1/2 to full glass of water with medication; takes Lipitor before bed; on antibiotic every 8 hours right now so full glass of water       Bowel function:  Denies issues    Sexual activity  Sexually active: using dilator to keep things open - having no pain with it         Diagnostics:    PMH:   Past Medical History:   Diagnosis Date    Acquired female bladder prolapse 2022    Carpal tunnel syndrome     BILATERAL    Diverticulosis of colon     Eczema 2018    Endometrial cancer 2023    Esophageal reflux     Female bladder prolapse 2022    GERD  (gastroesophageal reflux disease)     Hearing decreased, bilateral     Hematuria, microscopic 2012    W/u by urology was (-)    Hiatal hernia     High cholesterol 2016    Started Rx after TIA    History of brachytherapy 08/28/2023    vaginal brachytherapy    Hypertension 2016    Hyperthyroidism 1985    Resolved w/o Tx after ~ 6 months    Osteopenia     PFO (patent foramen ovale) 10/2016    per echo    Positive TB test     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    Right kidney mass 2016    found by Dr Cameron Hall    Single kidney 01/26/2018    TIA (transient ischemic attack) 10/2016    Wears glasses     READERS        Surgical HX:   Past Surgical History:   Procedure Laterality Date    BASAL CELL CARCINOMA EXCISION  2006    left temple    COLONOSCOPY  10/10/2019    Dr Mattson, 5 yr repeat family hx colon ca    LAPAROSCOPIC CHOLECYSTECTOMY  12/04/2012    LAPAROSCOPIC NEPHRECTOMY Right 01/26/2018    Oncocytoma    TOTAL LAPAROSCOPIC HYSTERECTOMY SALPINGO OOPHORECTOMY N/A 5/9/2023    Procedure: TOTAL LAPAROSCOPIC HYSTERECTOMY BILATERAL SALPINGO-OOPHORECTOMY, INJECTION FOR SENTINEL LYMPH NODE MAPPING, BILATERAL SENTINEL LYMPH NODE DISSECTION WITH [employer] ROBOT;  Surgeon: Lindsay Kingston MD;  [facility];  Service: Robotics - DaVinci;  Laterality: N/A;    WISDOM TOOTH EXTRACTION  1959        Menstrual cycle: n/a      Meds:   Current Outpatient Medications:     acetaminophen (TYLENOL) 325 MG tablet, Take 2 tablets by mouth Every 4 (Four) Hours As Needed for Mild Pain., Disp: 60 tablet, Rfl: 0    ASPIRIN 81 PO, Daily. (Patient not taking: Reported on 8/18/2023), Disp: , Rfl:     atorvastatin (LIPITOR) 80 MG tablet, Take 0.5 tablets by mouth every night at bedtime., Disp: 45 tablet, Rfl: 0    Calcium-Magnesium-Vitamin D 600-300-400 liquid, Take  by mouth Daily. 2 tbsp daily, Disp: , Rfl:     clindamycin (CLEOCIN) 300 MG capsule, Take 1 capsule by mouth 3 (Three) Times a Day for 7 days., Disp: 21 capsule, Rfl: 0     clopidogrel (PLAVIX) 75 MG tablet, Take 1 tablet by mouth Daily., Disp: 90 tablet, Rfl: 0    docusate sodium (COLACE) 250 MG capsule, Take 1 capsule by mouth 2 (Two) Times a Day. Take while requiring narcotics, Disp: 60 capsule, Rfl: 0    fluticasone (FLONASE) 50 MCG/ACT nasal spray, 2 sprays into the nostril(s) as directed by provider Daily As Needed for Rhinitis or Allergies., Disp: , Rfl:     hydrocortisone 2.5 % cream, As Needed for Irritation or Rash. PRN, Disp: , Rfl:     Multiple Vitamins-Minerals (MULTI VITAMIN/MINERALS PO), Take 1 tablet by mouth Daily., Disp: , Rfl:     Multiple Vitamins-Minerals (ZINC PO), Take  by mouth., Disp: , Rfl:     ramipril (ALTACE) 10 MG capsule, Take 1 capsule by mouth Daily., Disp: 90 capsule, Rfl: 0     Occupation: retired        Objective    Patient independently ambulates into clinic.     Verbal consent obtained for internal pelvic exam/treatment with declined need for second person in room  Objective           Patient independently ambulates into clinic.             Posture: mild forward head     Pelvic Alignment:     Palpation:     Supine:      Abdominals: unremarkable for tension      Iliacus : mild tension B     Psoas :  unremarkable for tension B     Adductors unremarkable for tension B           PELVIC FLOOR      External:                  Sensation: intact                 Skin quality: unremarkable for tension B                 Ischiocavernosus: unremarkable for tension B                 Supf and deep transverse perineal muscle: unremarkable for tension B                                             Internal:      Sensation: L diminished     Bulge: weak     Knack: weak     Wall Laxity: moderate anterior vaginal wall laxity                        Internal superficial PFM: unremarkable for tension B                 Levator ani: unremarkable for tension B                 Compressor urethra: unremarkable for increase urge           PERF SCALE:     Power: 2-            Endurance: 4           Repetitions: 3           Cued to lift finger in order to facilitate contraction                        See flowsheet for details of treatment following evaluation.           Basic information was provided regarding pelvic floor anatomy, explanation of the function of the pelvic floor, and contribution to symptoms.      Assessment/Plan:      The patient is an 83 y.o. female who presents to physical therapy today for bladder prolapse symptoms and incontinence. Upon initial evaluation, the patient demonstrates the following impairments: decreased strength and endurance of PFM. Due to these impairments, the patient is unable to complete ADLs without urinary leakage or sleep through the night. The patient would benefit from skilled pelvic PT services to address functional limitations and impairments and to improve patient quality of life.             Goals:      STG's: 4 weeks     · Patient will report waking no more than 2 times per night for improved quality of rest       · Patient will report > 25% improvement in urinary symptoms to decrease instances of urinary leakage and prolapse symptoms    · Patient will be able to perform HEP with minimal verbal cues           LTG's: By discharge     · Patient will report >75% improvement in urinary symptoms to decrease instances of urinary leakage and pelvic pressure with urination     · Patient functional outcome measure test, PFDI-20 improved score by >30 % for improved QOL    · Patient will decrease voiding frequency to once per night for improved quality of rest    · Patient will be independent with HEP                 Plan     Therapy options: will be seen for skilled physical therapy services     Planned modality interventions: TENS, ultrasound, cryotherapy, thermotherapy (hydrocollator packs)     Planned therapy interventions: abdominal trunk stabilization, manual therapy, neuromuscular re-education, body mechanics training, flexibility,  functional ROM exercises, gait training, home exercise program, joint mobilization, therapeutic activities, stretching, strengthening, spinal/joint mobilization, soft tissue mobilization and postural training, dry needling     Pt prognosis: good     Frequency: 1x (Pt appropriate for once per week but due to living further away wants to attend every 2-3 weeks)     Duration in visits: 7     Duration in weeks: 12     Treatment plan discussed with: patient        Treatment Time: 1400 - 1445  Timed:         Manual Therapy:    0     mins  72207;     Therapeutic Exercise:    8     mins  82308;     Neuromuscular Yolis:    0    mins  98769;    Therapeutic Activity:     0     mins  88797;     Gait Trainin     mins  72699;     Ultrasound:     0     mins  70454;    Ionto                               0    mins   73595  Self Care                       0     mins   38233  Canalith Repos    0     mins 11720      Un-Timed:  Electrical Stimulation:    0     mins  71935 ( );  Dry Needling     0     mins self-pay  Traction     0     mins 09784  Low Eval     0     Mins  35920  Mod Eval     35     Mins  92477  High Eval                            Mins  58494  Re-Eval                           0    mins  94181        Timed Treatment:   8   mins   Total Treatment:     43   mins    PT SIGNATURE:   Michael Todd PT   [place] License # [id number]    DATE TREATMENT INITIATED: 10/18/2023    Initial Certification  Certification Period: 2024  I certify that the therapy services are furnished while this patient is under my care.  The services outlined above are required by this patient, and will be reviewed every 90 days.       PHYSICIAN:   NPI:                                            DATE: 10/18/2023     Please sign and return via fax to [phone]. Thank you, [facility].

## 2023-10-20 DIAGNOSIS — I10 ESSENTIAL HYPERTENSION: ICD-10-CM

## 2023-10-23 ENCOUNTER — TELEPHONE (OUTPATIENT)
Dept: RADIATION ONCOLOGY | Facility: HOSPITAL | Age: 83
End: 2023-10-23
Payer: MEDICARE

## 2023-10-23 RX ORDER — RAMIPRIL 10 MG/1
10 CAPSULE ORAL DAILY
Qty: 90 CAPSULE | Refills: 0 | Status: SHIPPED | OUTPATIENT
Start: 2023-10-23

## 2023-10-23 NOTE — TELEPHONE ENCOUNTER
Due to patient's continuation of symptoms, called in Diflucan, 150mg, PO, X1 for yeast infection to patient's preferred Pharmacy.  Contact information provided.  Notified patient of prescription and directions for use.  Answered any questions that patient had regarding medication.  Patient has contact information and will call if symptoms persist or if she has additional questions.

## 2023-10-23 NOTE — TELEPHONE ENCOUNTER
Caller: Anabell Vera    Relationship: Self    Best call back number: [phone]     Requested Prescriptions:   Requested Prescriptions     Pending Prescriptions Disp Refills    ramipril (ALTACE) 10 MG capsule [Pharmacy Med Name: RAMIPRIL 10MG CAPSULES] 90 capsule 0     Sig: TAKE 1 CAPSULE BY MOUTH DAILY      Pharmacy where request should be sent: [id number] [place] [address] [address] [facility] [address] [facility] [phone] [facility] [phone] FX     Last office visit with prescribing clinician: 1/4/2023   Last telemedicine visit with prescribing clinician: Visit date not found   Next office visit with prescribing clinician: Visit date not found     Does the patient have less than a 3 day supply:  [x] Yes  [] No    Demetris Schumacher Rep   10/23/23 13:54 EDT

## 2023-10-23 NOTE — TELEPHONE ENCOUNTER
LV: 04/14/2023  NV: not scheduled    Dr. Toscano, no return to clinic date is documented please advise when she needs to be seen.

## 2023-10-31 NOTE — PROGRESS NOTES
Follow Up Office Visit      Patient Name: Anabell Vera  : 1940   MRN: [id number]     Chief Complaint:  Follow up, history of endometrial cancer    History of Present Illness: Anabell Vera is a 83 y.o. female who is here today to follow up with Gynecologic Oncology for a history of endometrial cancer. She completed VBT in 2023. Today, she is doing well. She has been having a sticky vaginal discharge since completing radiation. Bladder prolapse stable. Tried diflucan without relief. She does not have abdominal or pelvic pain. She is tolerating a regular diet and endorses a normal appetite. She denies nausea and vomiting. She denies early satiety and bloating. Denies any CP, SOB, lightheadedness or dizziness. She denies changes in her bowel/bladder.     Oncologic History:  Oncology/Hematology History   Endometrial cancer   3/29/2023 Initial Diagnosis    Endometrial cancer  Referred by Dr. 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 MMR intact  TVUS with uterus measuring 4.9x3.1x4.6 cm with EMS 13.0 mm. Normal appearing ovaries.      2023 Surgery    Robotic-assisted total laparoscopic hysterectomy with bilateral salpingo-oophorectomy, injection for sentinel lymph node dissection, and pelvic sentinel lymph node dissection    Pathology demonstrates a grade 2 endometrial cancer with 90% MI. Absent LVSI. Negative cervix and adnexa. Negative sentinel pelvic lymph nodes.    [facility] Kingston MD          Cancer Staged    Cancer Staging   Endometrial cancer  Staging form: Corpus Uteri - Carcinoma And Carcinosarcoma, AJCC 8th Edition  - Clinical stage from 2023: FIGO Stage IB (cT1b, cN0(sn), cM0) - Signed by Lindsay Kingston MD on 10/31/2023       2023 Cancer Staged    Staging form: Corpus Uteri - Carcinoma And Carcinosarcoma, AJCC 8th Edition  - Clinical stage from 2023: FIGO Stage IB (cT1b, cN0(sn), cM0) -  "Signed by Lindsay Kingston MD on 10/31/2023     8/15/2023 - 8/28/2023 Radiation    Radiation OncologyTreatment Course:  Anabell Vera received 3000 cGy in 5 fractions to upper vagina via High Dose Radiation - HDR.          I have reviewed and the following portions of the patient's history were updated as appropriate: past family history, past medical history, past social history, past surgical history, past obstetrics/gynecologic history and problem list.    Medications: The current medication list was reviewed with the patient and updated in the EMR this date per the Medical Assistant. Medication dosages and frequencies were confirmed to be accurate.      Allergies:   Allergies   Allergen Reactions    Motrin [Ibuprofen] Other (See Comments)     ONLY HAS ONE KIDNEY, NO IBUPROFEN    Wheat Rash     Gluten    Amoxicillin Rash    Penicillins Rash       Objective     Physical Exam:  Vital Signs:   Vitals:    11/09/23 1354   BP: 147/74   Pulse: 78   Resp: 18   Temp: 97.4 °F (36.3 °C)   TempSrc: Temporal   SpO2: 97%   Weight: 62.2 kg (137 lb 3.2 oz)   Height: 157.5 cm (62\")   PainSc: 0-No pain     BMI: Body mass index is 25.09 kg/m².   ECOG score: 0           PHQ-2 Depression Screening  Little interest or pleasure in doing things?     Feeling down, depressed, or hopeless?     PHQ-2 Total Score       Physical Examination:   General appearance - alert, well appearing, and in no distress and oriented to person, place, and time  Mental status - normal mood, behavior, speech, dress, motor activity, and thought processes  Eyes - sclera anicteric, left eye normal, right eye normal  Ears - right ear normal, left ear normal  Lymphatics - no palpable lymphadenopathy, no hepatosplenomegaly  Lungs - normal respiratory effort, clear to auscultation  Heart - normal rate, regular rhythm, normal S1, S2, no murmurs, rubs, clicks or gallops  Abdomen - soft, nontender, nondistended, no masses or organomegaly  Pelvic - external genitalia " normal, vagina with scant discharge, mild agglutination of the left vaginal sidewall that was easily reduced, friable mucosa along the left vaginal sidewall without any palpable lesions, vaginal cuff intact and without lesions, atrophy and telangectasia consistent with radiation noted, cervix, uterus and adnexa surgically absent, no palpable masses  Neurological - alert, oriented, normal speech, no focal findings or movement disorder noted  Musculoskeletal - no joint tenderness, deformity or swelling  Extremities - peripheral pulses normal, no pedal edema, no clubbing or cyanosis  Skin - normal coloration and turgor, no rashes, no suspicious skin lesions noted     Assessment / Plan    Anabell Vera is a 83 y.o. with a history of a stage IB, grade 2 endometrial cancer s/p surgical management followed by VBT (treatment completed in 8/2023).  She is currently without clinical evidence of disease. Signs of recurrent disease, such as vaginal bleeding, persistent abdominopelvic pain, urinary or bowel changes, and shortness of breath were reviewed.  She was advised to follow up immediately if she develops any of the above symptoms. She will follow-up in 3 months with radiation oncology and in 6 months with gynecologic oncology.    Vaginal discharge: Suspect secondary to vaginal mucosal changes caused by radiation. No evidence of recurrence today. Continue to monitor.  Bladder prolapse: Stable. Consider GYN follow up for pessary after rad onc follow up in 3 months if cleared.    Health maintenance: She was reminded to maintain a healthy lifestyle with a well balanced diet, calcium and vitamin D for osteoporosis prevention, and exercise as well as continue with recommended health and cancer screening guidelines.       Diagnoses and all orders for this visit:    1. History of uterine cancer (Primary)    2. Vaginal discharge    3. Female bladder prolapse         Follow Up: 6 months    SIMON Kingston MD  Gynecologic Oncology

## 2023-11-06 ENCOUNTER — TREATMENT (OUTPATIENT)
Dept: PHYSICAL THERAPY | Facility: CLINIC | Age: 83
End: 2023-11-06
Payer: MEDICARE

## 2023-11-06 DIAGNOSIS — N81.11 CYSTOCELE, MIDLINE: ICD-10-CM

## 2023-11-06 DIAGNOSIS — M62.81 MUSCLE WEAKNESS: Primary | ICD-10-CM

## 2023-11-06 PROCEDURE — 97112 NEUROMUSCULAR REEDUCATION: CPT | Performed by: PHYSICAL THERAPIST

## 2023-11-06 PROCEDURE — 97110 THERAPEUTIC EXERCISES: CPT | Performed by: PHYSICAL THERAPIST

## 2023-11-06 NOTE — PROGRESS NOTES
Physical Therapy Daily Treatment Note  PHYSICAL THERAPY    [address], [address]; [place] [address]  Patient: Anabell Vera   : 1940  Diagnosis/ICD-10 Code:  Muscle weakness [M62.81]  Referring practitioner: JIMBO Chun  Date of Initial Visit: Type: THERAPY  Noted: 10/18/2023  Today's Date: 2023  Patient seen for 2 sessions      Subjective   Anabell Vera reports doing her HEP and thinks symptoms have improved. Can stand and it doesn't come out now.   Pain Rating (0-10): 0      Objective   verbal consent obtained for external pelvic exam/treatment with declined need for second person in room     See Exercise, Manual, and Modality Logs for complete treatment.       Assessment/Plan      Progress per Plan of Care         1349/1430   Timed:         Manual Therapy:    0     mins  10508;     Therapeutic Exercise:    10     mins  84886;     Neuromuscular Yolis:    31    mins  95198;    Therapeutic Activity:     0     mins  44346;     Gait Trainin     mins  59498;     Ultrasound:     0     mins  04855;    Ionto                               0    mins   48589  Self Care                       0     mins   58847  Canalith Repos               0    mins  61485    Un-Timed:  Electrical Stimulation:    0     mins  53481 (MC );  Dry Needling     0     mins self-pay  Traction     0     mins 26089  Low Eval     0     Mins  09883  Mod Eval     0     Mins  73687  High Eval                       0     Mins  70908  Re-Eval                           0    mins  73129    Timed Treatment:   41   mins   Total Treatment:     41   mins    Michael Todd, PT  [place] License # [id number]  Physical Therapist

## 2023-11-07 ENCOUNTER — TELEPHONE (OUTPATIENT)
Dept: RADIATION ONCOLOGY | Facility: HOSPITAL | Age: 83
End: 2023-11-07
Payer: MEDICARE

## 2023-11-07 NOTE — TELEPHONE ENCOUNTER
"Returned patient's call regarding continued symptoms from yeast infections, including \"sticky\" discharge and reddened and inflamed prolapsed bladder.  Rosenda and Dr. Hyde discussed managing patient symptoms.  Patient is scheduled to see Dr. Kingston on Thursday, 11/9/23 w/ Vaginal exam, so will defer to Dr. Kingston for follow-up.  Recommended that patient discuss all symptoms with Dr. Kingston.  Patient has my contact information and will call with any additional questions or concerns.   "

## 2023-11-09 ENCOUNTER — OFFICE VISIT (OUTPATIENT)
Dept: GYNECOLOGIC ONCOLOGY | Facility: CLINIC | Age: 83
End: 2023-11-09
Payer: MEDICARE

## 2023-11-09 VITALS
HEART RATE: 78 BPM | DIASTOLIC BLOOD PRESSURE: 74 MMHG | OXYGEN SATURATION: 97 % | SYSTOLIC BLOOD PRESSURE: 147 MMHG | RESPIRATION RATE: 18 BRPM | BODY MASS INDEX: 25.25 KG/M2 | TEMPERATURE: 97.4 F | HEIGHT: 62 IN | WEIGHT: 137.2 LBS

## 2023-11-09 DIAGNOSIS — Z85.42 HISTORY OF UTERINE CANCER: Primary | ICD-10-CM

## 2023-11-09 DIAGNOSIS — N89.8 VAGINAL DISCHARGE: ICD-10-CM

## 2023-11-09 DIAGNOSIS — N81.10 FEMALE BLADDER PROLAPSE: ICD-10-CM

## 2023-11-10 DIAGNOSIS — E78.5 HYPERLIPIDEMIA, UNSPECIFIED HYPERLIPIDEMIA TYPE: ICD-10-CM

## 2023-11-10 DIAGNOSIS — I63.9 ACUTE CVA (CEREBROVASCULAR ACCIDENT): ICD-10-CM

## 2023-11-10 RX ORDER — CLOPIDOGREL BISULFATE 75 MG/1
75 TABLET ORAL DAILY
Qty: 90 TABLET | Refills: 0 | Status: SHIPPED | OUTPATIENT
Start: 2023-11-10

## 2023-11-10 RX ORDER — ATORVASTATIN CALCIUM 80 MG/1
40 TABLET, FILM COATED ORAL
Qty: 45 TABLET | Refills: 0 | Status: SHIPPED | OUTPATIENT
Start: 2023-11-10

## 2023-11-10 NOTE — TELEPHONE ENCOUNTER
Caller: Anabell Vera    Relationship: Self    Best call back number:      Requested Prescriptions:   Requested Prescriptions     Pending Prescriptions Disp Refills    clopidogrel (PLAVIX) 75 MG tablet 90 tablet 0     Sig: Take 1 tablet by mouth Daily.    atorvastatin (LIPITOR) 80 MG tablet 45 tablet 0     Sig: Take 0.5 tablets by mouth every night at bedtime.        Pharmacy where request should be sent: [id number] - [place] [address] [address] [facility] [address] [facility] [phone] [facility] [phone] FX     Last office visit with prescribing clinician: 1/4/2023   Last telemedicine visit with prescribing clinician: Visit date not found   Next office visit with prescribing clinician: Visit date not found     Additional details provided by patient:  PATIENT HAS 10 DAYS LEFT; SHE ALSO TAKES HALF OF THE LIPITOR AS WELL; PATIENT IS REQUESTING GENERICS ON BOTH OF MEDICATIONS    Does the patient have less than a 3 day supply:  [] Yes  [x] No      Demetris Francois Rep   11/10/23 11:45 EST

## 2023-11-13 ENCOUNTER — TRANSCRIBE ORDERS (OUTPATIENT)
Dept: ADMINISTRATIVE | Facility: HOSPITAL | Age: 83
End: 2023-11-13
Payer: MEDICARE

## 2023-11-13 DIAGNOSIS — Z12.31 VISIT FOR SCREENING MAMMOGRAM: Primary | ICD-10-CM

## 2023-11-28 ENCOUNTER — TREATMENT (OUTPATIENT)
Dept: PHYSICAL THERAPY | Facility: CLINIC | Age: 83
End: 2023-11-28
Payer: MEDICARE

## 2023-11-28 DIAGNOSIS — N81.11 CYSTOCELE, MIDLINE: ICD-10-CM

## 2023-11-28 DIAGNOSIS — M62.81 MUSCLE WEAKNESS: Primary | ICD-10-CM

## 2023-11-28 PROCEDURE — 97112 NEUROMUSCULAR REEDUCATION: CPT | Performed by: PHYSICAL THERAPIST

## 2023-11-28 NOTE — PROGRESS NOTES
Re-Assessment / Progress Note  PHYSICAL THERAPY    [address], [address]; [place] [address]      Patient: Anabell Vera   : 1940  Diagnosis/ICD-10 Code:  Muscle weakness [M62.81]  Referring practitioner: JIMBO Chun  Date of Initial Visit: Type: THERAPY  Noted: 10/18/2023  Today's Date: 2023  Patient seen for 3 sessions      Subjective:   Anabell Vera reports: she progressed to standing exercises and sitting seems to be harder. Not having any leakage now with standing up. Still has to rush to the bathroom. Met 100% for standing up leakage. During day can get to the bathroom in time. At night can't get to bathroom. Waking every 2 hours at night. Takes 1 pill at night, cholesterol medicine. Was taking it later but now trying to take at 8-9 PM. Trying to not drink as much as late. Thinks urge is the same at night as it was before prolapsed bladder.     Subjective Questionnaire: PFDI 20  Clinical Progress: improved  Home Program Compliance: Yes  Treatment has included: therapeutic exercise, neuromuscular re-education, and therapeutic activity    Objective   verbal consent obtained for external pelvic exam/treatment with declined need for second person in room   SEMG: supine: avg 9.9, max 21.3  Seated: avg 5.2, max 13.1  Standing avg 12.9, max 21.7      See flowsheet for details of treatment following reassessment.   Assessment/Plan  Progress to physical therapy goals is good.She reports improved urinary leakage and urgency. She has met 2/3 short term goals and 0/4 long term goals. She will benefit from continued skilled physical therapy to address remaining impairments and functional limitations.   Progress toward previous goals: Partially Met    STG's: 4 weeks      ·           Patient will report waking no more than 2 times per night for improved quality of rest  - in progress.       ·           Patient will report > 25% improvement in urinary symptoms to decrease instances of urinary  leakage and prolapse symptoms - met     ·           Patient will be able to perform HEP with minimal verbal cues - met            LTG's: By discharge      ·           Patient will report >75% improvement in urinary symptoms to decrease instances of urinary leakage and pelvic pressure with urination      ·           Patient functional outcome measure test, PFDI-20 improved score by >30 % for improved QOL     ·           Patient will decrease voiding frequency to once per night for improved quality of rest     ·           Patient will be independent with HEP                 Recommendations: Continue as planned  Timeframe: 2 months  Prognosis to achieve goals: good    PT Signature: Michael Todd PT      Based upon review of the patient's progress and continued therapy plan, it is my medical opinion that Anabell Vera should continue physical therapy treatment at [facility] PHYSICAL THERAPY  [address] [place] [id number] [phone].    Signature: __________________________________    PHYSICIAN: Юлия Musa APRN  NPI: [id number]                                        1400/1445  Manual Therapy:    0     mins  02113;  Therapeutic Exercise:   4     mins  53712;     Neuromuscular Yolis:   38    mins  90623;    Therapeutic Activity:     0     mins  65158;     Gait Trainin     mins  09395;     Ultrasound:     0     mins  79485;    Electrical Stimulation:    0     mins  59562 ( );  Dry Needling     0     mins self-pay    Timed Treatment:   42   mins   Total Treatment:     42   mins

## 2023-12-14 ENCOUNTER — TELEPHONE (OUTPATIENT)
Dept: PHYSICAL THERAPY | Facility: CLINIC | Age: 83
End: 2023-12-14
Payer: MEDICARE

## 2023-12-14 NOTE — TELEPHONE ENCOUNTER
Caller: Anabell Vera    Relationship: Self         What was the call regarding: DOING BETTER NOT RESCHEDULING

## 2024-01-04 ENCOUNTER — HOSPITAL ENCOUNTER (OUTPATIENT)
Dept: MAMMOGRAPHY | Facility: HOSPITAL | Age: 84
Discharge: HOME | End: 2024-01-04
Admitting: FAMILY MEDICINE
Payer: MEDICARE

## 2024-01-04 DIAGNOSIS — Z12.31 VISIT FOR SCREENING MAMMOGRAM: ICD-10-CM

## 2024-01-04 PROCEDURE — 77067 SCR MAMMO BI INCL CAD: CPT

## 2024-01-04 PROCEDURE — 77063 BREAST TOMOSYNTHESIS BI: CPT

## 2024-01-10 ENCOUNTER — OFFICE VISIT (OUTPATIENT)
Dept: INTERNAL MEDICINE | Facility: CLINIC | Age: 84
End: 2024-01-10
Payer: MEDICARE

## 2024-01-10 ENCOUNTER — LAB (OUTPATIENT)
Dept: INTERNAL MEDICINE | Facility: CLINIC | Age: 84
End: 2024-01-10
Payer: MEDICARE

## 2024-01-10 VITALS
BODY MASS INDEX: 25.03 KG/M2 | HEIGHT: 62 IN | OXYGEN SATURATION: 96 % | HEART RATE: 72 BPM | TEMPERATURE: 97.5 F | SYSTOLIC BLOOD PRESSURE: 134 MMHG | WEIGHT: 136 LBS | DIASTOLIC BLOOD PRESSURE: 76 MMHG

## 2024-01-10 DIAGNOSIS — I10 ESSENTIAL HYPERTENSION: ICD-10-CM

## 2024-01-10 DIAGNOSIS — E55.9 VITAMIN D DEFICIENCY: ICD-10-CM

## 2024-01-10 DIAGNOSIS — Z86.73 HISTORY OF EMBOLIC STROKE WITHOUT RESIDUAL DEFICITS: ICD-10-CM

## 2024-01-10 DIAGNOSIS — Z90.5 SINGLE KIDNEY: ICD-10-CM

## 2024-01-10 DIAGNOSIS — E78.5 HYPERLIPIDEMIA, UNSPECIFIED HYPERLIPIDEMIA TYPE: ICD-10-CM

## 2024-01-10 DIAGNOSIS — Z78.0 MENOPAUSE: ICD-10-CM

## 2024-01-10 DIAGNOSIS — I10 ESSENTIAL HYPERTENSION: Primary | ICD-10-CM

## 2024-01-10 DIAGNOSIS — E78.2 MIXED HYPERLIPIDEMIA: ICD-10-CM

## 2024-01-10 PROCEDURE — 80053 COMPREHEN METABOLIC PANEL: CPT | Performed by: FAMILY MEDICINE

## 2024-01-10 PROCEDURE — 82306 VITAMIN D 25 HYDROXY: CPT | Performed by: FAMILY MEDICINE

## 2024-01-10 PROCEDURE — 80061 LIPID PANEL: CPT | Performed by: FAMILY MEDICINE

## 2024-01-10 PROCEDURE — 84443 ASSAY THYROID STIM HORMONE: CPT | Performed by: FAMILY MEDICINE

## 2024-01-10 PROCEDURE — 36415 COLL VENOUS BLD VENIPUNCTURE: CPT | Performed by: FAMILY MEDICINE

## 2024-01-10 PROCEDURE — 85025 COMPLETE CBC W/AUTO DIFF WBC: CPT | Performed by: FAMILY MEDICINE

## 2024-01-10 RX ORDER — RAMIPRIL 10 MG/1
10 CAPSULE ORAL DAILY
Qty: 90 CAPSULE | Refills: 1 | Status: SHIPPED | OUTPATIENT
Start: 2024-01-10

## 2024-01-10 NOTE — PROGRESS NOTES
"Subjective   Anabell Vera is a 83 y.o. female.     Chief Complaint   Patient presents with    Hypertension     Requesting labs    Hyperlipidemia       Visit Vitals  /76 (BP Location: Left arm, Patient Position: Sitting, Cuff Size: Adult)   Pulse 72   Temp 97.5 °F (36.4 °C)   Ht 157.5 cm (62\")   Wt 61.7 kg (136 lb)   LMP  (LMP Unknown)   SpO2 96%   BMI 24.87 kg/m²         Hypertension  This is a chronic problem. The current episode started more than 1 year ago. The problem is unchanged. The problem is controlled. Pertinent negatives include no anxiety, blurred vision, chest pain, headaches, malaise/fatigue, neck pain, orthopnea, palpitations, peripheral edema, PND, shortness of breath or sweats. There are no associated agents to hypertension. Risk factors for coronary artery disease include dyslipidemia, post-menopausal state and family history. Current antihypertension treatment includes ACE inhibitors. The current treatment provides significant improvement. There are no compliance problems.  Hypertensive end-organ damage includes kidney disease and CVA. There is no history of angina, CAD/MI, heart failure, left ventricular hypertrophy, PVD or retinopathy. Identifiable causes of hypertension include chronic renal disease. There is no history of sleep apnea or a thyroid problem.   Hyperlipidemia  This is a chronic problem. The current episode started more than 1 year ago. The problem is controlled. Recent lipid tests were reviewed and are normal. Exacerbating diseases include chronic renal disease. She has no history of diabetes, hypothyroidism, liver disease, obesity or nephrotic syndrome. There are no known factors aggravating her hyperlipidemia. Pertinent negatives include no chest pain, focal sensory loss, focal weakness, leg pain, myalgias or shortness of breath. Current antihyperlipidemic treatment includes statins. The current treatment provides significant improvement of lipids. There are no compliance " problems.  Risk factors for coronary artery disease include dyslipidemia, hypertension, post-menopausal and family history.      Pt is seeing Dr Thee ST for f/u of endometrial cancer with hysterectomy and radiation to vagina. Pt still has vaginal discharge.    Pt does not have any problems with history of stroke.    Pt has bladder prolapse.   Pt has not had bone density since 2021.   Pt had mammogram last week that was benign.     Pt has enough plavix for PVD. Pt has enough lipitor for hyperlipidemia.   Pt has had more protein in her diet since radiation and more sodium because of low serum sodium.     Pt has a single kidney.   The following portions of the patient's history were reviewed and updated as appropriate: allergies, current medications, past family history, past medical history, past social history, past surgical history, and problem list.    Past Medical History:   Diagnosis Date    Acquired female bladder prolapse 01/2022    Annual GYN exam in  04/07/2022    Carpal tunnel syndrome     BILATERAL    Diverticulosis of colon     Eczema 03/21/2018    Endometrial cancer 03/29/2023    Esophageal reflux     Female bladder prolapse 01/2022    GERD (gastroesophageal reflux disease)     Hearing decreased, bilateral     Hematuria, microscopic 2012    W/u by urology was (-)    Hiatal hernia     High cholesterol 2016    Started Rx after TIA    History of brachytherapy 08/28/2023    vaginal brachytherapy    History of radiation therapy 08/2023    proximal vagina for endometrial CA    Hypertension 2016    Hyperthyroidism 1985    Resolved w/o Tx after ~ 6 months    Osteopenia     PFO (patent foramen ovale) 10/2016    per echo    Positive TB test     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    Right kidney mass 2016    found by Dr Cameron Hall    Single kidney 01/26/2018    TIA (transient ischemic attack) 10/2016    Wears glasses     READERS      Past Surgical History:   Procedure Laterality Date    BASAL CELL  CARCINOMA EXCISION  2006    left temple    COLONOSCOPY  10/10/2019    Dr Mattson, 5 yr repeat family hx colon ca    LAPAROSCOPIC CHOLECYSTECTOMY  2012    LAPAROSCOPIC NEPHRECTOMY Right 2018    Oncocytoma    OOPHORECTOMY      TOTAL LAPAROSCOPIC HYSTERECTOMY SALPINGO OOPHORECTOMY N/A 2023    Procedure: TOTAL LAPAROSCOPIC HYSTERECTOMY BILATERAL SALPINGO-OOPHORECTOMY, INJECTION FOR SENTINEL LYMPH NODE MAPPING, BILATERAL SENTINEL LYMPH NODE DISSECTION WITH DAVINCI ROBOT;  Surgeon: Lindsay Kingston MD;  [facility]: [facility];  Service: Robotics - DaVinci;  Laterality: N/A;    WISDOM TOOTH EXTRACTION        Family History   Problem Relation Age of Onset    Cancer Father     Colon cancer Father     Pneumonia Father     Arthritis Mother     Diabetes Mother     Heart failure Mother     Diabetes Brother     Heart attack Brother     Cancer Brother     Colon polyps Brother     Prostate cancer Brother     Cancer Brother         on lips    Diabetes Sister     Heart attack Sister     Multiple sclerosis Sister     Diabetes Son     Diabetes Maternal Grandmother     Cervical cancer Maternal Grandmother     Breast cancer Other     Ovarian cancer Neg Hx       Social History     Socioeconomic History    Marital status:    Tobacco Use    Smoking status: Former     Packs/day: 0.25     Years: 2.00     Additional pack years: 0.00     Total pack years: 0.50     Types: Cigarettes     Quit date:      Years since quittin.0    Smokeless tobacco: Never    Tobacco comments:     2 years only   Vaping Use    Vaping Use: Never used   Substance and Sexual Activity    Alcohol use: Yes     Comment: rarely drinks wine    Drug use: No    Sexual activity: Not Currently     Partners: Male      Allergies   Allergen Reactions    Motrin [Ibuprofen] Other (See Comments)     ONLY HAS ONE KIDNEY, NO IBUPROFEN    Wheat Rash     Gluten    Amoxicillin Rash    Penicillins Rash       Review of Systems   Constitutional:  Negative for  malaise/fatigue.   Eyes:  Negative for blurred vision.   Respiratory:  Negative for shortness of breath.    Cardiovascular:  Negative for chest pain, palpitations, orthopnea and PND.   Musculoskeletal:  Negative for myalgias and neck pain.   Neurological:  Negative for focal weakness and headaches.       Objective   Physical Exam  Vitals and nursing note reviewed.   Constitutional:       Appearance: She is well-developed.   HENT:      Head: Normocephalic.      Right Ear: Tympanic membrane, ear canal and external ear normal.      Left Ear: Tympanic membrane, ear canal and external ear normal.      Nose: Nose normal.   Eyes:      General: Lids are normal.      Conjunctiva/sclera: Conjunctivae normal.      Pupils: Pupils are equal, round, and reactive to light.   Neck:      Thyroid: No thyroid mass or thyromegaly.      Vascular: No carotid bruit.      Trachea: Trachea normal.   Cardiovascular:      Rate and Rhythm: Normal rate and regular rhythm.      Heart sounds: No murmur heard.  Pulmonary:      Effort: Pulmonary effort is normal. No respiratory distress.      Breath sounds: Normal breath sounds. No decreased breath sounds, wheezing, rhonchi or rales.   Chest:      Chest wall: No tenderness.   Abdominal:      General: Bowel sounds are normal.      Palpations: Abdomen is soft.      Tenderness: There is no abdominal tenderness.   Musculoskeletal:         General: Normal range of motion.      Cervical back: Normal range of motion and neck supple.   Skin:     General: Skin is warm and dry.   Neurological:      Mental Status: She is alert and oriented to person, place, and time.   Psychiatric:         Behavior: Behavior normal.         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     Other Visit Diagnoses       Essential hypertension    -  Primary    Relevant Medications    ramipril (ALTACE) 10 MG capsule    Other Relevant Orders    Comprehensive Metabolic Panel    Lipid Panel    TSH Rfx On Abnormal To Free T4    CBC & Differential    Hyperlipidemia, unspecified hyperlipidemia type        Relevant Orders    Comprehensive Metabolic Panel    Lipid Panel    Menopause        Relevant Orders    DEXA Bone Density Axial          Diagnoses         Codes Comments    Essential hypertension    -  Primary ICD-10-CM: I10  ICD-9-CM: 401.9     Vitamin D deficiency     ICD-10-CM: E55.9  ICD-9-CM: 268.9     History 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              Current Outpatient Medications:     atorvastatin (LIPITOR) 80 MG tablet, Take 0.5 tablets by mouth every night at bedtime., Disp: 45 tablet, Rfl: 0    Calcium-Magnesium-Vitamin D 600-300-400 liquid, Take  by mouth Daily. 2 tbsp daily, Disp: , Rfl:     clopidogrel (PLAVIX) 75 MG tablet, Take 1 tablet by mouth Daily., Disp: 90 tablet, Rfl: 0    docusate sodium (COLACE) 250 MG capsule, Take 1 capsule by mouth 2 (Two) Times a Day. Take while requiring narcotics, Disp: 60 capsule, Rfl: 0    hydrocortisone 2.5 % cream, As Needed for Irritation or Rash. PRN, Disp: , Rfl:     Multiple Vitamins-Minerals (MULTI VITAMIN/MINERALS PO), Take 1 tablet by mouth Daily., Disp: , Rfl:     ramipril (ALTACE) 10 MG capsule, Take 1 capsule by mouth Daily., Disp: 90 capsule, Rfl: 1    acetaminophen (TYLENOL) 325 MG tablet, Take 2 tablets by mouth Every 4 (Four)  Hours As Needed for Mild Pain., Disp: 60 tablet, Rfl: 0    Return in about 6 months (around 7/10/2024), or if symptoms worsen or fail to improve, for Recheck, Medicare Wellness.

## 2024-01-11 LAB
25(OH)D3 SERPL-MCNC: 59.7 NG/ML (ref 30–100)
ALBUMIN SERPL-MCNC: 4.8 G/DL (ref 3.5–5.2)
ALBUMIN/GLOB SERPL: 2.7 G/DL
ALP SERPL-CCNC: 66 U/L (ref 39–117)
ALT SERPL W P-5'-P-CCNC: 20 U/L (ref 1–33)
ANION GAP SERPL CALCULATED.3IONS-SCNC: 14 MMOL/L (ref 5–15)
AST SERPL-CCNC: 25 U/L (ref 1–32)
BASOPHILS # BLD AUTO: 0.01 10*3/MM3 (ref 0–0.2)
BASOPHILS NFR BLD AUTO: 0.2 % (ref 0–1.5)
BILIRUB SERPL-MCNC: 0.5 MG/DL (ref 0–1.2)
BUN SERPL-MCNC: 25 MG/DL (ref 8–23)
BUN/CREAT SERPL: 19.8 (ref 7–25)
CALCIUM SPEC-SCNC: 9.9 MG/DL (ref 8.6–10.5)
CHLORIDE SERPL-SCNC: 101 MMOL/L (ref 98–107)
CHOLEST SERPL-MCNC: 163 MG/DL (ref 0–200)
CO2 SERPL-SCNC: 23 MMOL/L (ref 22–29)
CREAT SERPL-MCNC: 1.26 MG/DL (ref 0.57–1)
DEPRECATED RDW RBC AUTO: 38.7 FL (ref 37–54)
EGFRCR SERPLBLD CKD-EPI 2021: 42.7 ML/MIN/1.73
EOSINOPHIL # BLD AUTO: 0.06 10*3/MM3 (ref 0–0.4)
EOSINOPHIL NFR BLD AUTO: 1.1 % (ref 0.3–6.2)
ERYTHROCYTE [DISTWIDTH] IN BLOOD BY AUTOMATED COUNT: 12 % (ref 12.3–15.4)
GLOBULIN UR ELPH-MCNC: 1.8 GM/DL
GLUCOSE SERPL-MCNC: 99 MG/DL (ref 65–99)
HCT VFR BLD AUTO: 42.3 % (ref 34–46.6)
HDLC SERPL-MCNC: 58 MG/DL (ref 40–60)
HGB BLD-MCNC: 14 G/DL (ref 12–15.9)
LDLC SERPL CALC-MCNC: 92 MG/DL (ref 0–100)
LDLC/HDLC SERPL: 1.58 {RATIO}
LYMPHOCYTES # BLD AUTO: 1.2 10*3/MM3 (ref 0.7–3.1)
LYMPHOCYTES NFR BLD AUTO: 22.3 % (ref 19.6–45.3)
MCH RBC QN AUTO: 29.1 PG (ref 26.6–33)
MCHC RBC AUTO-ENTMCNC: 33.1 G/DL (ref 31.5–35.7)
MCV RBC AUTO: 87.9 FL (ref 79–97)
MONOCYTES # BLD AUTO: 0.7 10*3/MM3 (ref 0.1–0.9)
MONOCYTES NFR BLD AUTO: 13 % (ref 5–12)
NEUTROPHILS NFR BLD AUTO: 3.39 10*3/MM3 (ref 1.7–7)
NEUTROPHILS NFR BLD AUTO: 62.8 % (ref 42.7–76)
PLATELET # BLD AUTO: 138 10*3/MM3 (ref 140–450)
PMV BLD AUTO: 11.9 FL (ref 6–12)
POTASSIUM SERPL-SCNC: 4.7 MMOL/L (ref 3.5–5.2)
PROT SERPL-MCNC: 6.6 G/DL (ref 6–8.5)
RBC # BLD AUTO: 4.81 10*6/MM3 (ref 3.77–5.28)
SODIUM SERPL-SCNC: 138 MMOL/L (ref 136–145)
TRIGL SERPL-MCNC: 66 MG/DL (ref 0–150)
TSH SERPL DL<=0.05 MIU/L-ACNC: 3.71 UIU/ML (ref 0.27–4.2)
VLDLC SERPL-MCNC: 13 MG/DL (ref 5–40)
WBC NRBC COR # BLD AUTO: 5.39 10*3/MM3 (ref 3.4–10.8)

## 2024-01-12 ENCOUNTER — TELEPHONE (OUTPATIENT)
Dept: INTERNAL MEDICINE | Facility: CLINIC | Age: 84
End: 2024-01-12
Payer: MEDICARE

## 2024-01-12 NOTE — TELEPHONE ENCOUNTER
Caller: Anabell Vera    Relationship: Self    Best call back number: [phone]     What is the best time to reach you: ANY    Who are you requesting to speak with (clinical staff, provider,  specific staff member): DR. PABON'S NURSE    What was the call regarding: THE PATIENT WOULD LIKE THE NURSE TO CALL HER TO ANSWER SOME QUESTIONS SHE HAS. SHE HAS QUESTIONS REGARDING THE LAB RESULTS SHE RECEIVED. PLEASE CALL HER WHEN AVAILABLE.     Is it okay if the provider responds through MyChart: NO

## 2024-01-12 NOTE — TELEPHONE ENCOUNTER
Pt says she recently (1 week) started taking a new multivitamin (Centrum) it does have iron in it  Her old multivitamin did not have iron. She asked if she should add another iron supplement

## 2024-01-15 NOTE — TELEPHONE ENCOUNTER
Since her chemo treatment she has been having some light bleeding it is not much.  Should she take the multivitamin with iron.

## 2024-01-17 ENCOUNTER — TELEPHONE (OUTPATIENT)
Dept: RADIATION ONCOLOGY | Facility: HOSPITAL | Age: 84
End: 2024-01-17
Payer: MEDICARE

## 2024-01-17 ENCOUNTER — TELEPHONE (OUTPATIENT)
Dept: GYNECOLOGIC ONCOLOGY | Facility: CLINIC | Age: 84
End: 2024-01-17
Payer: MEDICARE

## 2024-01-17 NOTE — TELEPHONE ENCOUNTER
Caller: Sheldon Vera    Relationship: Self    Best call back number: [phone]    What is the best time to reach you: ANY    Who are you requesting to speak with (clinical staff, provider,  specific staff member): CLINICAL     What was the call regarding: SHELDON IS CALLING STATES SHE IS NEEDING AN APPOINTMENT. SHE HAS ONE IN MAY BUT SHE IS EXPERIENCING BLEEDING OFF AND ON      PLEASE CALL TO ADVISE

## 2024-01-17 NOTE — TELEPHONE ENCOUNTER
RN called patient back and patient reported to RN her symptoms.  Patient stated that she has had incidents of vaginal spotting in the past but they were associated with radiation- per MD last visit. Patient stated that the last week she has had three incidents of spotting with no apparent associated cause. Patient was scheduled to see Dr. Marr in May and wanted to have her appointment moved up.   RN transferred the patient to  for appointment.

## 2024-01-17 NOTE — TELEPHONE ENCOUNTER
Problem: Occupational Therapy Goal  Goal: Occupational Therapy Goal  Description: Goals to be met by: discharge     Patient will increase functional independence with ADLs by performing:    LE Dressing with Minimal Assistance and AD as needed.  Grooming while seated with Supervision.  Toileting from bedside commode with Stand-by Assistance for hygiene and clothing management.   Toilet transfer to bedside commode with Stand-by Assistance.    Outcome: Ongoing, Progressing      "After discussing patient clinical and treatment history with Dr. Marrufo, returned patient's call regarding the return of bleeding and \"sticky\" discharge.  Patient reports having no bleeding or discharge since her last exam by Dr. Kingston on 11/9/23.  Patient reports recurrence of bleeding on Saturday, 1/13/24; no bleeding/discharge on Sunday - Monday; then a return of the bleeding/discharge with a small blood clot present on Tuesday, 1/15/24.  Patient has follow-up appointments scheduled with Dr. Marrufo (Dr. Hyde has retired) on 2/16/24 and Dr. Marr (Dr. Kingston is no longer with this practice) on 5/8/24.  Dr. Marrufo recommends that the patient call Dr. Marr's office and ask to been seen regarding the return of these symptoms.  Patient was agreeable to this plan.  Patient has my contact information and will call with any additional questions or concerns.   "

## 2024-01-18 ENCOUNTER — OFFICE VISIT (OUTPATIENT)
Dept: GYNECOLOGIC ONCOLOGY | Facility: CLINIC | Age: 84
End: 2024-01-18
Payer: MEDICARE

## 2024-01-18 VITALS
RESPIRATION RATE: 18 BRPM | HEIGHT: 62 IN | TEMPERATURE: 98.2 F | WEIGHT: 136.5 LBS | DIASTOLIC BLOOD PRESSURE: 69 MMHG | SYSTOLIC BLOOD PRESSURE: 144 MMHG | OXYGEN SATURATION: 97 % | HEART RATE: 75 BPM | BODY MASS INDEX: 25.12 KG/M2

## 2024-01-18 DIAGNOSIS — Z85.42 HISTORY OF ENDOMETRIAL CANCER: ICD-10-CM

## 2024-01-18 DIAGNOSIS — N93.9 VAGINAL BLEEDING: Primary | ICD-10-CM

## 2024-01-18 DIAGNOSIS — N89.8 VAGINAL DISCHARGE: ICD-10-CM

## 2024-01-18 PROCEDURE — 87661 TRICHOMONAS VAGINALIS AMPLIF: CPT | Performed by: NURSE PRACTITIONER

## 2024-01-18 PROCEDURE — 3077F SYST BP >= 140 MM HG: CPT | Performed by: NURSE PRACTITIONER

## 2024-01-18 PROCEDURE — 1160F RVW MEDS BY RX/DR IN RCRD: CPT | Performed by: NURSE PRACTITIONER

## 2024-01-18 PROCEDURE — 87798 DETECT AGENT NOS DNA AMP: CPT | Performed by: NURSE PRACTITIONER

## 2024-01-18 PROCEDURE — 1159F MED LIST DOCD IN RCRD: CPT | Performed by: NURSE PRACTITIONER

## 2024-01-18 PROCEDURE — 3078F DIAST BP <80 MM HG: CPT | Performed by: NURSE PRACTITIONER

## 2024-01-18 PROCEDURE — 99215 OFFICE O/P EST HI 40 MIN: CPT | Performed by: NURSE PRACTITIONER

## 2024-01-18 PROCEDURE — 1126F AMNT PAIN NOTED NONE PRSNT: CPT | Performed by: NURSE PRACTITIONER

## 2024-01-18 PROCEDURE — 87801 DETECT AGNT MULT DNA AMPLI: CPT | Performed by: NURSE PRACTITIONER

## 2024-01-18 NOTE — PROGRESS NOTES
"GYN ONCOLOGY FOLLOW-UP    Anabell Vera  [id number]  1940    Subjective   Chief Complaint: Follow-up (C/o vaginal bleeding and discharge)        History of present illness:     Anabell Vera is a 83 y.o. year old female who is here today for complaint of vaginal bleeding and discharge. She has a history of endometrial cancer as outlined below, s/p surgery and vaginal brachytherapy. She has undergone pelvic floor PT under the care of Michael Todd PT since treatment completion. She c/o a \"sticky\" vaginal discharge (sometime with mucus and itching) and very light vaginal spotting since completing her radiation. More recently she has had \"blood spurts\" vaginally about every 3 days over the last week with a blood clot a few days ago. There is no active bleeding today, but these symptoms became concerning to her and she called requesting evaluation. She is using her vaginal dilator as instructed by providers and PT, about twice weekly. She is using mineral oil for lubrication. She is also concerned regarding her known bladder prolapse and inquires if there could be something cancerous there. Lastly, she requests review of recent labs from PCP office and previous derm biopsy. She inquires if these results could be indicative of cancer or increase her risk.       Oncology History:    Oncology/Hematology History   Endometrial cancer   3/29/2023 Initial Diagnosis    Endometrial cancer  Referred by Dr. 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 MMR intact  TVUS with uterus measuring 4.9x3.1x4.6 cm with EMS 13.0 mm. Normal appearing ovaries.      5/19/2023 Surgery    Robotic-assisted total laparoscopic hysterectomy with bilateral salpingo-oophorectomy, injection for sentinel lymph node dissection, and pelvic sentinel lymph node dissection    Pathology demonstrates a grade 2 endometrial cancer with 90% MI. Absent LVSI. Negative cervix and adnexa. Negative " "sentinel pelvic lymph nodes.    [facility]          Cancer Staged    Cancer Staging   Endometrial cancer  Staging form: Corpus Uteri - Carcinoma And Carcinosarcoma, AJCC 8th Edition  - Clinical stage from 5/19/2023: FIGO Stage IB (cT1b, cN0(sn), cM0) - Signed by Lindsay Kingston MD on 10/31/2023       5/19/2023 Cancer Staged    Staging form: Corpus Uteri - Carcinoma And Carcinosarcoma, AJCC 8th Edition  - Clinical stage from 5/19/2023: FIGO Stage IB (cT1b, cN0(sn), cM0) - Signed by Lindsay Kingston MD on 10/31/2023     8/15/2023 - 8/28/2023 Radiation    Radiation OncologyTreatment Course:  Anabell Vera received 3000 cGy in 5 fractions to upper vagina via High Dose Radiation - HDR.           The current medication list and allergy list were reviewed and reconciled.     Past Medical History, Past Surgical History, Social History, Family History have been reviewed and are without significant changes except as mentioned.      Review of Systems   Constitutional: Negative.    Gastrointestinal: Negative.    Genitourinary:  Positive for pelvic pressure, vaginal bleeding and vaginal discharge.   Psychiatric/Behavioral:  The patient is nervous/anxious.        Objective   Physical Exam  Vital Signs: /69   Pulse 75   Temp 98.2 °F (36.8 °C) (Temporal)   Resp 18   Ht 157.5 cm (62\")   Wt 61.9 kg (136 lb 8 oz)   LMP  (LMP Unknown)   SpO2 97%   BMI 24.97 kg/m²   Vitals:    01/18/24 1027   PainSc: 0-No pain           General Appearance:  alert, cooperative, no apparent distress, appears stated age, and normal weight   Neurologic/Psych: A&O x 3, gait steady, appropriate affect   HEENT:  Normocephalic, without obvious abnormality, mucous membranes moist   Breasts:  deferred   Abdomen:   Soft, non-tender, non-distended, and no organomegaly   Lymph nodes: No cervical, supraclavicular, inguinal adenopathy noted   Extremities: Normal, atraumatic; no clubbing, cyanosis, or edema    Pelvic: " External Genitalia  atrophic, without lesions  Vagina  is pale, atrophic, grade 2-3 cystocele, and laxity of vaginal walls noted. No bleeding noted, mild normal-appearing discharge. NuSwab obtained.   Vaginal Cuff  Female Vaginal Cuff: smooth, intact, without visible lesions, changes consistent with previous radiation, and mild friability consistent with previous radiation  Uterus  surgically absent and no palpable masses  Ovaries  surgically absent bilaterally  Parametria  smooth  Rectovaginal  Female rectovaginal: deferred     ECOG score: 0               Result Review :   The following data was reviewed by: JIMBO Gage on 01/18/2024:  Data reviewed : hysterectomy pathology, GynOnc surgeon's notes, Radiation Oncology clinic notes, labs from PCP 1/10/2024, previous dermatology records in [place]      Procedure Notes:              Assessment and Plan:    Diagnoses and all orders for this visit:    1. Vaginal bleeding (Primary)    2. Vaginal discharge  -     NuSwab Vaginitis (VG) - Swab, Vagina; Future  -     NuSwab Vaginitis (VG) - Swab, Vagina    3. History of endometrial cancer          Patient and I discussed her symptoms and my physical exam findings. Discussed vaginal discharge appears normal, but NuSwab obtained to rule out any infection. She will be notified of results upon their return and treated accordingly if needed. No active bleeding or blood in vagina. Vaginal cuff is mildly friable consistent with previous radiation, but not abnormal given this history. Patient overall reassured no lesions or evidence of disease. Discussed lax vaginal walls and cystocele filling vagina. This is likely causing any spotting after dilator use to pool at top of the vagina, then come out at once. Diagrams drawn and anatomy models used to aid discussion. No bladder abnormalities other than cystocele noted. She is ok to continue dilator use as instructed.    Patient and I also reviewed her previous dermatologic  records in her chart. I explained that there is no relationship between her previous derm biopsies and her endometrial cancer. Despite discussion patient remains anxious, explained endometrial cancer pathophysiology and attempted to reassure patient. PCP's labs from earlier this month also reviewed per patient request. No additional concerns or recommendations beyond what has already been discussed by Dr. Toscano's office. Patient may follow-up with ordering provider with any additional questions.     Pain assessment was performed today as a part of patient’s care.  For patients with pain related to surgery, gynecologic malignancy or cancer treatment, the plan is as noted in the assessment/plan.  For patients with pain not related to these issues, they are to seek any further needed care from a more appropriate provider, such as PCP.      I spent 43 minutes caring for Anabell on this date of service. This time includes time spent by me in the following activities: preparing for the visit, reviewing tests, performing a medically appropriate examination and/or evaluation, counseling and educating the patient/family/caregiver, ordering medications, tests, or procedures, and answering all questions to the patient's satisfaction .       Follow-up:     Return to clinic in May 2024 for ongoing cancer surveillance as scheduled.      Electronically signed by JIMBO Gage on 01/18/2024

## 2024-01-22 LAB
A VAGINAE DNA VAG QL NAA+PROBE: NORMAL SCORE
BVAB2 DNA VAG QL NAA+PROBE: NORMAL SCORE
C ALBICANS DNA VAG QL NAA+PROBE: NEGATIVE
C GLABRATA DNA VAG QL NAA+PROBE: NEGATIVE
MEGA1 DNA VAG QL NAA+PROBE: NORMAL SCORE
T VAGINALIS DNA VAG QL NAA+PROBE: NEGATIVE

## 2024-01-23 ENCOUNTER — TELEPHONE (OUTPATIENT)
Dept: GYNECOLOGIC ONCOLOGY | Facility: CLINIC | Age: 84
End: 2024-01-23
Payer: MEDICARE

## 2024-01-23 ENCOUNTER — HOSPITAL ENCOUNTER (OUTPATIENT)
Dept: BONE DENSITY | Facility: HOSPITAL | Age: 84
Discharge: HOME | End: 2024-01-23
Admitting: FAMILY MEDICINE
Payer: MEDICARE

## 2024-01-23 DIAGNOSIS — Z78.0 MENOPAUSE: ICD-10-CM

## 2024-01-23 PROCEDURE — 77080 DXA BONE DENSITY AXIAL: CPT

## 2024-01-23 NOTE — TELEPHONE ENCOUNTER
----- Message from JIMBO Gage sent at 1/23/2024  2:35 PM EST -----  Please notify patient NuSwab totally negative. No infection. No new medications needed. Thanks

## 2024-01-23 NOTE — TELEPHONE ENCOUNTER
RN received a return call from patient and RN reported negative nu-swab. Patient verbalized understanding.

## 2024-01-24 ENCOUNTER — TELEPHONE (OUTPATIENT)
Dept: INTERNAL MEDICINE | Facility: CLINIC | Age: 84
End: 2024-01-24
Payer: MEDICARE

## 2024-01-24 NOTE — TELEPHONE ENCOUNTER
----- Message from Alayna PEREZ MD sent at 1/24/2024  9:37 AM EST -----  Please call the patient regarding her abnormal result. Pt has osteopenia with increased risk for fracture.  Medication such as a bisphosphonate( Fosamax or Boniva) or injectables are recommended.     Please continue weight bearing exercise, vitamin D and calcium rich diet-or supplements.   Please avoid falls.   Please avoid tobacco and alcohol.    Please repeat a DEXA scan in about 2 years.

## 2024-02-10 DIAGNOSIS — I63.9 ACUTE CVA (CEREBROVASCULAR ACCIDENT): ICD-10-CM

## 2024-02-10 DIAGNOSIS — E78.5 HYPERLIPIDEMIA, UNSPECIFIED HYPERLIPIDEMIA TYPE: ICD-10-CM

## 2024-02-12 RX ORDER — CLOPIDOGREL BISULFATE 75 MG/1
75 TABLET ORAL DAILY
Qty: 90 TABLET | Refills: 0 | Status: SHIPPED | OUTPATIENT
Start: 2024-02-12

## 2024-02-12 RX ORDER — ATORVASTATIN CALCIUM 80 MG/1
40 TABLET, FILM COATED ORAL
Qty: 45 TABLET | Refills: 0 | Status: SHIPPED | OUTPATIENT
Start: 2024-02-12

## 2024-04-22 ENCOUNTER — TELEPHONE (OUTPATIENT)
Dept: INTERNAL MEDICINE | Facility: CLINIC | Age: 84
End: 2024-04-22
Payer: MEDICARE

## 2024-04-22 DIAGNOSIS — H91.90 HEARING LOSS, UNSPECIFIED HEARING LOSS TYPE, UNSPECIFIED LATERALITY: Primary | ICD-10-CM

## 2024-04-22 NOTE — TELEPHONE ENCOUNTER
Caller: Anabell Vera    Relationship: Self    Best call back number: [phone]     What is the medical concern/diagnosis: LOSS OF HEARING    What specialty or service is being requested: HEARING TEST    What is the provider, practice or medical service name: [facility]    What is the office location: [facility]    What is the office phone number: FAX NUMBER [phone]    Any additional details: AS SOON AS POSSIBLE

## 2024-05-08 ENCOUNTER — OFFICE VISIT (OUTPATIENT)
Dept: GYNECOLOGIC ONCOLOGY | Facility: CLINIC | Age: 84
End: 2024-05-08
Payer: MEDICARE

## 2024-05-08 VITALS
OXYGEN SATURATION: 96 % | HEIGHT: 62 IN | RESPIRATION RATE: 18 BRPM | DIASTOLIC BLOOD PRESSURE: 92 MMHG | WEIGHT: 140.1 LBS | HEART RATE: 78 BPM | SYSTOLIC BLOOD PRESSURE: 153 MMHG | TEMPERATURE: 98 F | BODY MASS INDEX: 25.78 KG/M2

## 2024-05-08 DIAGNOSIS — Z92.3 HISTORY OF RADIATION THERAPY: ICD-10-CM

## 2024-05-08 DIAGNOSIS — N93.9 VAGINAL BLEEDING: ICD-10-CM

## 2024-05-08 DIAGNOSIS — N81.11 CYSTOCELE, MIDLINE: ICD-10-CM

## 2024-05-08 DIAGNOSIS — C54.1 ENDOMETRIAL CANCER: Primary | ICD-10-CM

## 2024-05-08 PROCEDURE — 88305 TISSUE EXAM BY PATHOLOGIST: CPT | Performed by: OBSTETRICS & GYNECOLOGY

## 2024-05-08 RX ORDER — FLUTICASONE PROPIONATE 50 MCG
2 SPRAY, SUSPENSION (ML) NASAL DAILY
COMMUNITY

## 2024-05-09 ENCOUNTER — TELEPHONE (OUTPATIENT)
Dept: GYNECOLOGIC ONCOLOGY | Facility: CLINIC | Age: 84
End: 2024-05-09
Payer: MEDICARE

## 2024-05-10 DIAGNOSIS — I63.9 ACUTE CVA (CEREBROVASCULAR ACCIDENT): ICD-10-CM

## 2024-05-10 DIAGNOSIS — E78.5 HYPERLIPIDEMIA, UNSPECIFIED HYPERLIPIDEMIA TYPE: ICD-10-CM

## 2024-05-10 RX ORDER — ATORVASTATIN CALCIUM 80 MG/1
40 TABLET, FILM COATED ORAL
Qty: 45 TABLET | Refills: 0 | Status: SHIPPED | OUTPATIENT
Start: 2024-05-10

## 2024-05-10 RX ORDER — CLOPIDOGREL BISULFATE 75 MG/1
75 TABLET ORAL DAILY
Qty: 90 TABLET | Refills: 0 | Status: SHIPPED | OUTPATIENT
Start: 2024-05-10

## 2024-07-12 ENCOUNTER — TELEPHONE (OUTPATIENT)
Dept: INTERNAL MEDICINE | Facility: CLINIC | Age: 84
End: 2024-07-12

## 2024-07-12 DIAGNOSIS — D69.6 LOW PLATELET COUNT: ICD-10-CM

## 2024-07-12 DIAGNOSIS — E55.9 VITAMIN D DEFICIENCY: ICD-10-CM

## 2024-07-12 DIAGNOSIS — I10 PRIMARY HYPERTENSION: ICD-10-CM

## 2024-07-12 DIAGNOSIS — D72.821 MONOCYTOSIS: ICD-10-CM

## 2024-07-12 DIAGNOSIS — Z90.5 SINGLE KIDNEY: ICD-10-CM

## 2024-07-12 DIAGNOSIS — E78.2 MIXED HYPERLIPIDEMIA: Primary | ICD-10-CM

## 2024-07-12 NOTE — TELEPHONE ENCOUNTER
Caller: Anabell Vera    Relationship: Self    Best call back number: [phone]     What orders are you requesting (i.e. lab or imaging): KIDNEY FUNCTION LABS, THE PANEL THAT IS NORMALLY (CBC PANEL)    In what timeframe would the patient need to come in: ASAP    Where will you receive your lab/imaging services: IN OFFICE    Additional notes: BECAUSE OF HER RESULTS LAST TIME PATIENT WOULD LIKE TO JUST GET THIS PANEL RECHECKED. CAN THIS ORDER BE PLACED? PLEASE CALL TO LET HER KNOW.

## 2024-07-15 DIAGNOSIS — I10 ESSENTIAL HYPERTENSION: ICD-10-CM

## 2024-07-15 NOTE — TELEPHONE ENCOUNTER
Caller: Anabell Vera    Relationship: Self    Best call back number:      Requested Prescriptions:   Requested Prescriptions     Pending Prescriptions Disp Refills    ramipril (ALTACE) 10 MG capsule 90 capsule 1     Sig: Take 1 capsule by mouth Daily.        Pharmacy where request should be sent: [id number] [place], [place] - [address] [facility] [address] [facility] [phone] [facility] [phone] FX     Last office visit with prescribing clinician: 1/10/2024   Last telemedicine visit with prescribing clinician: Visit date not found   Next office visit with prescribing clinician: 9/30/2024     Additional details provided by patient: PATIENT HAS 6 LEFT     Does the patient have less than a 3 day supply:  [] Yes  [x] No      Demetris Francois Rep   07/15/24 15:05 EDT

## 2024-07-16 RX ORDER — RAMIPRIL 10 MG/1
10 CAPSULE ORAL DAILY
Qty: 90 CAPSULE | Refills: 0 | Status: SHIPPED | OUTPATIENT
Start: 2024-07-16

## 2024-07-16 NOTE — TELEPHONE ENCOUNTER
Rx Refill Note  Requested Prescriptions     Pending Prescriptions Disp Refills    ramipril (ALTACE) 10 MG capsule 90 capsule 0     Sig: Take 1 capsule by mouth Daily.      Last office visit with prescribing clinician: 1/10/2024     Next office visit with prescribing clinician: 9/30/2024       Sandra Leon  07/16/24, 08:11 EDT

## 2024-07-18 ENCOUNTER — LAB (OUTPATIENT)
Dept: INTERNAL MEDICINE | Facility: CLINIC | Age: 84
End: 2024-07-18
Payer: MEDICARE

## 2024-07-18 DIAGNOSIS — D69.6 LOW PLATELET COUNT: ICD-10-CM

## 2024-07-18 DIAGNOSIS — Z90.5 SINGLE KIDNEY: ICD-10-CM

## 2024-07-18 DIAGNOSIS — E55.9 VITAMIN D DEFICIENCY: ICD-10-CM

## 2024-07-18 DIAGNOSIS — D72.821 MONOCYTOSIS: ICD-10-CM

## 2024-07-18 DIAGNOSIS — I10 PRIMARY HYPERTENSION: ICD-10-CM

## 2024-07-18 DIAGNOSIS — E78.2 MIXED HYPERLIPIDEMIA: ICD-10-CM

## 2024-07-18 LAB
25(OH)D3 SERPL-MCNC: 51.5 NG/ML (ref 30–100)
BASOPHILS # BLD AUTO: 0.02 10*3/MM3 (ref 0–0.2)
BASOPHILS NFR BLD AUTO: 0.5 % (ref 0–1.5)
DEPRECATED RDW RBC AUTO: 39.7 FL (ref 37–54)
EOSINOPHIL # BLD AUTO: 0.03 10*3/MM3 (ref 0–0.4)
EOSINOPHIL NFR BLD AUTO: 0.7 % (ref 0.3–6.2)
ERYTHROCYTE [DISTWIDTH] IN BLOOD BY AUTOMATED COUNT: 12.3 % (ref 12.3–15.4)
HCT VFR BLD AUTO: 41.2 % (ref 34–46.6)
HGB BLD-MCNC: 14.1 G/DL (ref 12–15.9)
IMM GRANULOCYTES # BLD AUTO: 0.01 10*3/MM3 (ref 0–0.05)
IMM GRANULOCYTES NFR BLD AUTO: 0.2 % (ref 0–0.5)
LYMPHOCYTES # BLD AUTO: 1.64 10*3/MM3 (ref 0.7–3.1)
LYMPHOCYTES NFR BLD AUTO: 37 % (ref 19.6–45.3)
MCH RBC QN AUTO: 30.5 PG (ref 26.6–33)
MCHC RBC AUTO-ENTMCNC: 34.2 G/DL (ref 31.5–35.7)
MCV RBC AUTO: 89 FL (ref 79–97)
MONOCYTES # BLD AUTO: 0.66 10*3/MM3 (ref 0.1–0.9)
MONOCYTES NFR BLD AUTO: 14.9 % (ref 5–12)
NEUTROPHILS NFR BLD AUTO: 2.07 10*3/MM3 (ref 1.7–7)
NEUTROPHILS NFR BLD AUTO: 46.7 % (ref 42.7–76)
PLATELET # BLD AUTO: 145 10*3/MM3 (ref 140–450)
PMV BLD AUTO: 11.8 FL (ref 6–12)
RBC # BLD AUTO: 4.63 10*6/MM3 (ref 3.77–5.28)
WBC NRBC COR # BLD AUTO: 4.43 10*3/MM3 (ref 3.4–10.8)

## 2024-07-18 PROCEDURE — 85025 COMPLETE CBC W/AUTO DIFF WBC: CPT | Performed by: FAMILY MEDICINE

## 2024-07-18 PROCEDURE — 36415 COLL VENOUS BLD VENIPUNCTURE: CPT | Performed by: FAMILY MEDICINE

## 2024-07-18 PROCEDURE — 83540 ASSAY OF IRON: CPT | Performed by: FAMILY MEDICINE

## 2024-07-18 PROCEDURE — 80053 COMPREHEN METABOLIC PANEL: CPT | Performed by: FAMILY MEDICINE

## 2024-07-18 PROCEDURE — 82306 VITAMIN D 25 HYDROXY: CPT | Performed by: FAMILY MEDICINE

## 2024-07-18 PROCEDURE — 80061 LIPID PANEL: CPT | Performed by: FAMILY MEDICINE

## 2024-07-18 PROCEDURE — 84466 ASSAY OF TRANSFERRIN: CPT | Performed by: FAMILY MEDICINE

## 2024-07-19 DIAGNOSIS — D72.821 MONOCYTOSIS: Primary | ICD-10-CM

## 2024-07-19 DIAGNOSIS — E87.1 HYPONATREMIA: ICD-10-CM

## 2024-07-19 LAB
ALBUMIN SERPL-MCNC: 4.5 G/DL (ref 3.5–5.2)
ALBUMIN/GLOB SERPL: 1.9 G/DL
ALP SERPL-CCNC: 65 U/L (ref 39–117)
ALT SERPL W P-5'-P-CCNC: 18 U/L (ref 1–33)
ANION GAP SERPL CALCULATED.3IONS-SCNC: 13.4 MMOL/L (ref 5–15)
AST SERPL-CCNC: 26 U/L (ref 1–32)
BILIRUB SERPL-MCNC: 0.7 MG/DL (ref 0–1.2)
BUN SERPL-MCNC: 21 MG/DL (ref 8–23)
BUN/CREAT SERPL: 18.6 (ref 7–25)
CALCIUM SPEC-SCNC: 9.3 MG/DL (ref 8.6–10.5)
CHLORIDE SERPL-SCNC: 96 MMOL/L (ref 98–107)
CHOLEST SERPL-MCNC: 117 MG/DL (ref 0–200)
CO2 SERPL-SCNC: 21.6 MMOL/L (ref 22–29)
CREAT SERPL-MCNC: 1.13 MG/DL (ref 0.57–1)
EGFRCR SERPLBLD CKD-EPI 2021: 48.4 ML/MIN/1.73
GLOBULIN UR ELPH-MCNC: 2.4 GM/DL
GLUCOSE SERPL-MCNC: 101 MG/DL (ref 65–99)
HDLC SERPL-MCNC: 47 MG/DL (ref 40–60)
IRON 24H UR-MRATE: 96 MCG/DL (ref 37–145)
IRON SATN MFR SERPL: 25 % (ref 20–50)
LDLC SERPL CALC-MCNC: 53 MG/DL (ref 0–100)
LDLC/HDLC SERPL: 1.11 {RATIO}
POTASSIUM SERPL-SCNC: 4.7 MMOL/L (ref 3.5–5.2)
PROT SERPL-MCNC: 6.9 G/DL (ref 6–8.5)
SODIUM SERPL-SCNC: 131 MMOL/L (ref 136–145)
TIBC SERPL-MCNC: 386 MCG/DL (ref 298–536)
TRANSFERRIN SERPL-MCNC: 259 MG/DL (ref 200–360)
TRIGL SERPL-MCNC: 88 MG/DL (ref 0–150)
VLDLC SERPL-MCNC: 17 MG/DL (ref 5–40)

## 2024-08-07 ENCOUNTER — OFFICE VISIT (OUTPATIENT)
Dept: GYNECOLOGIC ONCOLOGY | Facility: CLINIC | Age: 84
End: 2024-08-07
Payer: MEDICARE

## 2024-08-07 VITALS
HEART RATE: 74 BPM | HEIGHT: 62 IN | SYSTOLIC BLOOD PRESSURE: 143 MMHG | RESPIRATION RATE: 18 BRPM | WEIGHT: 139.7 LBS | TEMPERATURE: 98.2 F | OXYGEN SATURATION: 98 % | DIASTOLIC BLOOD PRESSURE: 71 MMHG | BODY MASS INDEX: 25.71 KG/M2

## 2024-08-07 DIAGNOSIS — N81.11 CYSTOCELE, MIDLINE: ICD-10-CM

## 2024-08-07 DIAGNOSIS — C54.1 ENDOMETRIAL CANCER: Primary | ICD-10-CM

## 2024-08-07 DIAGNOSIS — M62.830 PARASPINAL MUSCLE SPASM: ICD-10-CM

## 2024-08-07 PROCEDURE — 99213 OFFICE O/P EST LOW 20 MIN: CPT | Performed by: OBSTETRICS & GYNECOLOGY

## 2024-08-07 PROCEDURE — 1160F RVW MEDS BY RX/DR IN RCRD: CPT | Performed by: OBSTETRICS & GYNECOLOGY

## 2024-08-07 PROCEDURE — 1125F AMNT PAIN NOTED PAIN PRSNT: CPT | Performed by: OBSTETRICS & GYNECOLOGY

## 2024-08-07 PROCEDURE — 3078F DIAST BP <80 MM HG: CPT | Performed by: OBSTETRICS & GYNECOLOGY

## 2024-08-07 PROCEDURE — 1159F MED LIST DOCD IN RCRD: CPT | Performed by: OBSTETRICS & GYNECOLOGY

## 2024-08-07 PROCEDURE — 3077F SYST BP >= 140 MM HG: CPT | Performed by: OBSTETRICS & GYNECOLOGY

## 2024-08-07 NOTE — PROGRESS NOTES
Anabell Vera  [id number]  1940      Reason for visit:  endometrial cancer, surveillance    History of present illness:  The patient is a 84 y.o. year old female who presents today for treatment and evaluation of the above issues.     Patient is overall doing well.  Patients vaginal d/c, has history of POP.  LT paraspinal muscle pain/spasm. No VB.  Normal bowel/bladder function. Going to see Dr. Khan in the future about pelvic prolapse to see about a pessary.  She was advised that she can push out her visits here and that Dr. Khan in this office can coordinate care.    Oncologic History:  Oncology/Hematology History   Endometrial cancer   3/29/2023 Initial Diagnosis    Endometrial cancer  Referred by Dr. 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 MMR intact  TVUS with uterus measuring 4.9x3.1x4.6 cm with EMS 13.0 mm. Normal appearing ovaries.      5/19/2023 Surgery    Robotic-assisted total laparoscopic hysterectomy with bilateral salpingo-oophorectomy, injection for sentinel lymph node dissection, and pelvic sentinel lymph node dissection    Pathology demonstrates a grade 2 endometrial cancer with 90% MI. Absent LVSI. Negative cervix and adnexa. Negative sentinel pelvic lymph nodes.    [facility]          Cancer Staged    Cancer Staging   Endometrial cancer  Staging form: Corpus Uteri - Carcinoma And Carcinosarcoma, AJCC 8th Edition  - Clinical stage from 5/19/2023: FIGO Stage IB (cT1b, cN0(sn), cM0) - Signed by Lindsay Kingston MD on 10/31/2023       5/19/2023 Cancer Staged    Staging form: Corpus Uteri - Carcinoma And Carcinosarcoma, AJCC 8th Edition  - Clinical stage from 5/19/2023: FIGO Stage IB (cT1b, cN0(sn), cM0) - Signed by Lindsay Kingston MD on 10/31/2023     8/15/2023 - 8/28/2023 Radiation    Radiation OncologyTreatment Course:  Anabell Vera received 3000 cGy in 5 fractions to upper vagina via  High Dose Radiation - HDR.           Past Medical History:   Diagnosis Date    Acquired female bladder prolapse 01/2022    Annual GYN exam in  04/07/2022    Carpal tunnel syndrome     BILATERAL    Diverticulosis of colon     Eczema 03/21/2018    Endometrial cancer 03/29/2023    Esophageal reflux     Female bladder prolapse 01/2022    GERD (gastroesophageal reflux disease)     Hearing decreased, bilateral     Hematuria, microscopic 2012    W/u by urology was (-)    Hiatal hernia     High cholesterol 2016    Started Rx after TIA    History of brachytherapy 08/28/2023    vaginal brachytherapy    History of radiation therapy 08/2023    proximal vagina for endometrial CA    History of radiation therapy 5/8/2024    Hypertension 2016    Hyperthyroidism 1985    Resolved w/o Tx after ~ 6 months    Osteopenia     PFO (patent foramen ovale) 10/2016    per echo    Positive TB test     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    Right kidney mass 2016    found by Dr Cameron Hall    Single kidney 01/26/2018    TIA (transient ischemic attack) 10/2016    Wears glasses     READERS       Past Surgical History:   Procedure Laterality Date    BASAL CELL CARCINOMA EXCISION  2006    left temple    COLONOSCOPY  10/10/2019    Dr Mattson, 5 yr repeat family hx colon ca    LAPAROSCOPIC CHOLECYSTECTOMY  12/04/2012    LAPAROSCOPIC NEPHRECTOMY Right 01/26/2018    Oncocytoma    OOPHORECTOMY      TOTAL LAPAROSCOPIC HYSTERECTOMY SALPINGO OOPHORECTOMY N/A 05/09/2023    Procedure: TOTAL LAPAROSCOPIC HYSTERECTOMY BILATERAL SALPINGO-OOPHORECTOMY, INJECTION FOR SENTINEL LYMPH NODE MAPPING, BILATERAL SENTINEL LYMPH NODE DISSECTION WITH DAVINCI ROBOT;  Surgeon: Lindsay Kingston MD;  [facility];  Service: Robotics - DaVinci;  Laterality: N/A;    WISDOM TOOTH EXTRACTION  1959       MEDICATIONS:    Current Outpatient Medications:     acetaminophen (TYLENOL) 325 MG tablet, Take 2 tablets by mouth Every 4 (Four) Hours As Needed for Mild Pain.,  Disp: 60 tablet, Rfl: 0    atorvastatin (LIPITOR) 80 MG tablet, TAKE 1/2 TABLET BY MOUTH EVERY NIGHT AT BEDTIME, Disp: 45 tablet, Rfl: 0    Calcium-Magnesium-Vitamin D 600-300-400 liquid, Take  by mouth Daily. 2 tbsp daily, Disp: , Rfl:     clopidogrel (PLAVIX) 75 MG tablet, TAKE 1 TABLET BY MOUTH DAILY, Disp: 90 tablet, Rfl: 0    fluticasone (FLONASE) 50 MCG/ACT nasal spray, 2 sprays into the nostril(s) as directed by provider Daily., Disp: , Rfl:     hydrocortisone 1 % cream, As Needed for Irritation or Rash. PRN, Disp: , Rfl:     Multiple Vitamins-Minerals (MULTI VITAMIN/MINERALS PO), Take 1 tablet by mouth Daily., Disp: , Rfl:     ramipril (ALTACE) 10 MG capsule, Take 1 capsule by mouth Daily., Disp: 90 capsule, Rfl: 0     Allergies:  is allergic to motrin [ibuprofen], wheat, amoxicillin, garlic, latex, and penicillins.    Social History:   Social History     Socioeconomic History    Marital status:    Tobacco Use    Smoking status: Former     Current packs/day: 0.00     Average packs/day: 0.3 packs/day for 2.0 years (0.5 ttl pk-yrs)     Types: Cigarettes     Start date:      Quit date:      Years since quittin.6    Smokeless tobacco: Never    Tobacco comments:     2 years only   Vaping Use    Vaping status: Never Used   Substance and Sexual Activity    Alcohol use: Yes     Comment: rarely drinks wine    Drug use: No    Sexual activity: Not Currently     Partners: Male       Family History:    Family History   Problem Relation Age of Onset    Cancer Father     Colon cancer Father     Pneumonia Father     Arthritis Mother     Diabetes Mother     Heart failure Mother     Diabetes Brother     Heart attack Brother     Cancer Brother     Colon polyps Brother     Prostate cancer Brother     Cancer Brother         on lips    Diabetes Sister     Heart attack Sister     Multiple sclerosis Sister     Diabetes Son     Diabetes Maternal Grandmother     Cervical cancer Maternal Grandmother     Breast  "cancer Other     Ovarian cancer Neg Hx        Health Maintenance:    Health Maintenance   Topic Date Due    ZOSTER VACCINE (1 of 2) Never done    RSV Vaccine - Adults (1 - 1-dose 60+ series) Never done    Pneumococcal Vaccine 65+ (2 of 2 - PPSV23 or PCV20) 12/30/2016    ANNUAL WELLNESS VISIT  06/30/2023    COVID-19 Vaccine (7 - 2023-24 season) 01/05/2024    INFLUENZA VACCINE  08/01/2024    COLORECTAL CANCER SCREENING  10/10/2024    BMI FOLLOWUP  10/10/2024    LIPID PANEL  07/18/2025    DXA SCAN  01/23/2026    TDAP/TD VACCINES (2 - Td or Tdap) 06/15/2028       Review of Systems:  Please refer to history of present illness.  Review of systems otherwise negative.    Vitals:    08/07/24 1434   BP: 143/71   Pulse: 74   Resp: 18   Temp: 98.2 °F (36.8 °C)   TempSrc: Temporal   SpO2: 98%   Weight: 63.4 kg (139 lb 11.2 oz)   Height: 157.5 cm (62\")   PainSc:   1   PainLoc: Back  Comment: left kidney area--feels when laying down     Body mass index is 25.55 kg/m².  Wt Readings from Last 3 Encounters:   08/07/24 63.4 kg (139 lb 11.2 oz)   05/08/24 63.5 kg (140 lb 1.6 oz)   01/18/24 61.9 kg (136 lb 8 oz)     Physical Exam:  GENERAL: Alert, well-appearing female appearing her stated age who is in no apparent distress.   HEENT: Sclera anicteric. Head normocephalic, atraumatic. Mucus membranes moist.   NECK: Trachea midline, supple, without masses.  No thyromegaly.   BREASTS: Deferred  CARDIOVASCULAR: Normal rate, regular rhythm, no murmurs, rubs, or gallops. No peripheral edema.  RESPIRATORY: Clear to auscultation bilaterally, normal respiratory effort  BACK:  No CVA tenderness, no vertebral tenderness on palpation  GASTROINTESTINAL:  Abdomen is soft, non-tender, non-distended, no rebound or guarding, no masses, or hernias.   SKIN:  Warm, dry, well-perfused.  All visible areas intact.  No rashes, lesions, ulcers.  PSYCHIATRIC: AO x3, with appropriate affect, normal thought processes.  NEUROLOGIC: No focal deficits. Moves " "extremities well.  MUSCULOSKELETAL: Normal gait and station. *Reproducible pain with palpation at the left paraspinous muscle  EXTREMITIES:   No cyanosis, clubbing, symmetric.  LYMPHATICS:  No cervical or inguinal adenopathy noted.     PELVIC exam:  External genitalia are free from concerning lesion.  There is bilateral introitus irritation which is symmetric and consistent with irritation from pelvic organ prolapse.  On speculum examination, the vaginal cuff was intact and free from lesion.  There was significant pelvic organ prolapse.  On bimanual examination, no fullness was appreciated.  Uterus, cervix and adnexa were absent.  There was no significant tenderness.  Rectovaginal exam was deferred.    ECOG PS 0    PROCEDURES: none    Diagnostic Data:    5/8/24  Final Diagnosis   VAGINAL BIOPSY:   Acute erosive vaginitis with stromal edema  No viral changes or fungal organisms identified on routine stains  Negative for dysplasia or malignancy         Lab Results   Component Value Date    WBC 4.43 07/18/2024    HGB 14.1 07/18/2024    HCT 41.2 07/18/2024    MCV 89.0 07/18/2024     07/18/2024    NEUTROABS 2.07 07/18/2024    GLUCOSE 101 (H) 07/18/2024    BUN 21 07/18/2024    CREATININE 1.13 (H) 07/18/2024    EGFRIFNONA 49 (L) 03/11/2021     (L) 07/18/2024    K 4.7 07/18/2024    CL 96 (L) 07/18/2024    CO2 21.6 (L) 07/18/2024    PHOS 3.0 01/04/2023    CALCIUM 9.3 07/18/2024    ALBUMIN 4.5 07/18/2024    AST 26 07/18/2024    ALT 18 07/18/2024    BILITOT 0.7 07/18/2024     Lab Results   Component Value Date    TSH 3.710 01/10/2024    TSH 3.320 03/30/2022    TSH 3.540 08/21/2020    TSH 3.700 08/15/2019     No results found for: \"FT4\"  No results found for: \"\"      Assessment & Plan   This is a 84 y.o. woman with endometrial cancer who presents for surveillance     Encounter Diagnoses   Name Primary?    Endometrial cancer Yes    Paraspinal muscle spasm     Cystocele, midline      Stage 1B endometrial cancer; " JENNY  -Cancer history as above  -Completed treatment in 08/2023  -Vaginal bleeding resolved.  -Planning on seeing Dr. Khan for consideration of pessary.  Advised her we could coordinate care and push out her next visit 3 months after he sees her.  She is going to work on getting into see him.  -Left paraspinous muscle spasm: Advised tennis ball on a wall or massage to help with this.    Pain assessment was performed today as a part of patient’s care.  For patients with pain related to surgery, gynecologic malignancy or cancer treatment, the plan is as noted in the assessment/plan.  For patients with pain not related to these issues, they are to seek any further needed care from a more appropriate provider, such as PCP.      No orders of the defined types were placed in this encounter.    FOLLOW UP: 3 months     I spent 20 minutes caring for Anabell on this date of service. This time includes time spent by me in the following activities: preparing for the visit, reviewing tests, performing a medically appropriate examination and/or evaluation, counseling and educating the patient/family/caregiver, referring and communicating with other health care professionals, documenting information in the medical record, and care coordination      Suzette Marr MD  08/07/24  20:04 EDT

## 2024-08-08 PROBLEM — N93.9 VAGINAL BLEEDING: Status: RESOLVED | Noted: 2024-05-08 | Resolved: 2024-08-08

## 2024-08-13 ENCOUNTER — LAB (OUTPATIENT)
Dept: INTERNAL MEDICINE | Facility: CLINIC | Age: 84
End: 2024-08-13
Payer: MEDICARE

## 2024-08-13 DIAGNOSIS — E78.5 HYPERLIPIDEMIA, UNSPECIFIED HYPERLIPIDEMIA TYPE: ICD-10-CM

## 2024-08-13 DIAGNOSIS — I63.9 ACUTE CVA (CEREBROVASCULAR ACCIDENT): ICD-10-CM

## 2024-08-13 DIAGNOSIS — D72.821 MONOCYTOSIS: ICD-10-CM

## 2024-08-13 DIAGNOSIS — E87.1 HYPONATREMIA: ICD-10-CM

## 2024-08-13 LAB
ANION GAP SERPL CALCULATED.3IONS-SCNC: 11.1 MMOL/L (ref 5–15)
BASOPHILS # BLD AUTO: 0.01 10*3/MM3 (ref 0–0.2)
BASOPHILS NFR BLD AUTO: 0.3 % (ref 0–1.5)
BUN SERPL-MCNC: 24 MG/DL (ref 8–23)
BUN/CREAT SERPL: 23.3 (ref 7–25)
CALCIUM SPEC-SCNC: 9.7 MG/DL (ref 8.6–10.5)
CHLORIDE SERPL-SCNC: 101 MMOL/L (ref 98–107)
CO2 SERPL-SCNC: 23.9 MMOL/L (ref 22–29)
CREAT SERPL-MCNC: 1.03 MG/DL (ref 0.57–1)
DEPRECATED RDW RBC AUTO: 40.8 FL (ref 37–54)
EGFRCR SERPLBLD CKD-EPI 2021: 54.1 ML/MIN/1.73
EOSINOPHIL # BLD AUTO: 0.04 10*3/MM3 (ref 0–0.4)
EOSINOPHIL NFR BLD AUTO: 1 % (ref 0.3–6.2)
ERYTHROCYTE [DISTWIDTH] IN BLOOD BY AUTOMATED COUNT: 12.5 % (ref 12.3–15.4)
GLUCOSE SERPL-MCNC: 105 MG/DL (ref 65–99)
HCT VFR BLD AUTO: 39.6 % (ref 34–46.6)
HGB BLD-MCNC: 13.4 G/DL (ref 12–15.9)
IMM GRANULOCYTES # BLD AUTO: 0.01 10*3/MM3 (ref 0–0.05)
IMM GRANULOCYTES NFR BLD AUTO: 0.3 % (ref 0–0.5)
LYMPHOCYTES # BLD AUTO: 1.33 10*3/MM3 (ref 0.7–3.1)
LYMPHOCYTES NFR BLD AUTO: 33.7 % (ref 19.6–45.3)
MCH RBC QN AUTO: 30.5 PG (ref 26.6–33)
MCHC RBC AUTO-ENTMCNC: 33.8 G/DL (ref 31.5–35.7)
MCV RBC AUTO: 90.2 FL (ref 79–97)
MONOCYTES # BLD AUTO: 0.54 10*3/MM3 (ref 0.1–0.9)
MONOCYTES NFR BLD AUTO: 13.7 % (ref 5–12)
NEUTROPHILS NFR BLD AUTO: 2.02 10*3/MM3 (ref 1.7–7)
NEUTROPHILS NFR BLD AUTO: 51 % (ref 42.7–76)
NRBC BLD AUTO-RTO: 0 /100 WBC (ref 0–0.2)
PLATELET # BLD AUTO: 151 10*3/MM3 (ref 140–450)
PMV BLD AUTO: 11.2 FL (ref 6–12)
POTASSIUM SERPL-SCNC: 4.7 MMOL/L (ref 3.5–5.2)
RBC # BLD AUTO: 4.39 10*6/MM3 (ref 3.77–5.28)
SODIUM SERPL-SCNC: 136 MMOL/L (ref 136–145)
WBC NRBC COR # BLD AUTO: 3.95 10*3/MM3 (ref 3.4–10.8)

## 2024-08-13 PROCEDURE — 80048 BASIC METABOLIC PNL TOTAL CA: CPT | Performed by: FAMILY MEDICINE

## 2024-08-13 PROCEDURE — 36415 COLL VENOUS BLD VENIPUNCTURE: CPT | Performed by: FAMILY MEDICINE

## 2024-08-13 PROCEDURE — 85025 COMPLETE CBC W/AUTO DIFF WBC: CPT | Performed by: FAMILY MEDICINE

## 2024-08-13 RX ORDER — ATORVASTATIN CALCIUM 80 MG/1
40 TABLET, FILM COATED ORAL
Qty: 45 TABLET | Refills: 0 | Status: SHIPPED | OUTPATIENT
Start: 2024-08-13

## 2024-08-13 RX ORDER — CLOPIDOGREL BISULFATE 75 MG/1
75 TABLET ORAL DAILY
Qty: 90 TABLET | Refills: 0 | Status: SHIPPED | OUTPATIENT
Start: 2024-08-13

## 2024-08-13 NOTE — TELEPHONE ENCOUNTER
Patient stopped by she needs her meds refilled.  Clopidogrel 75mg Tablets  Atorvastatin 80mg tablets    [facility] [phone]

## 2024-08-13 NOTE — TELEPHONE ENCOUNTER
Rx Refill Note  Requested Prescriptions     Pending Prescriptions Disp Refills    clopidogrel (PLAVIX) 75 MG tablet 90 tablet 0     Sig: Take 1 tablet by mouth Daily.    atorvastatin (LIPITOR) 80 MG tablet 45 tablet 0     Sig: Take 0.5 tablets by mouth every night at bedtime.      Last office visit with prescribing clinician: 1/10/2024     Next office visit with prescribing clinician: 9/30/2024       Sandra Leon  08/13/24, 10:47 EDT

## 2024-08-14 RX ORDER — ATORVASTATIN CALCIUM 80 MG/1
40 TABLET, FILM COATED ORAL
Qty: 45 TABLET | Refills: 0 | OUTPATIENT
Start: 2024-08-14

## 2024-08-14 RX ORDER — CLOPIDOGREL BISULFATE 75 MG/1
75 TABLET ORAL DAILY
Qty: 90 TABLET | Refills: 0 | OUTPATIENT
Start: 2024-08-14

## 2024-08-14 NOTE — TELEPHONE ENCOUNTER
Diabetic neuropathy well-controlled on gabapentin.  BG averaging in the mid 100's.  Significantly better since starting Farxiga 10 mg,  No longer on insulins     -Positive and stable microalbumin, elevated blood glucose and improving A1c to 7.8 from 9.2 from 5 months ago    Monitor BG's, keep average < 150   Continue current management   Follow up in 6 months    Patient declined to schedule wellness.  She will address with Dr. Toscano at January follow up.

## 2024-09-30 ENCOUNTER — OFFICE VISIT (OUTPATIENT)
Dept: INTERNAL MEDICINE | Facility: CLINIC | Age: 84
End: 2024-09-30
Payer: MEDICARE

## 2024-09-30 VITALS
BODY MASS INDEX: 25.21 KG/M2 | TEMPERATURE: 97.7 F | HEIGHT: 62 IN | OXYGEN SATURATION: 98 % | SYSTOLIC BLOOD PRESSURE: 130 MMHG | WEIGHT: 137 LBS | HEART RATE: 65 BPM | DIASTOLIC BLOOD PRESSURE: 72 MMHG

## 2024-09-30 DIAGNOSIS — E78.2 MIXED HYPERLIPIDEMIA: ICD-10-CM

## 2024-09-30 DIAGNOSIS — I63.9 ACUTE CVA (CEREBROVASCULAR ACCIDENT): ICD-10-CM

## 2024-09-30 DIAGNOSIS — E55.9 VITAMIN D DEFICIENCY: ICD-10-CM

## 2024-09-30 DIAGNOSIS — D64.9 ANEMIA, UNSPECIFIED TYPE: ICD-10-CM

## 2024-09-30 DIAGNOSIS — I10 PRIMARY HYPERTENSION: Primary | ICD-10-CM

## 2024-09-30 PROCEDURE — 1159F MED LIST DOCD IN RCRD: CPT | Performed by: FAMILY MEDICINE

## 2024-09-30 PROCEDURE — 3075F SYST BP GE 130 - 139MM HG: CPT | Performed by: FAMILY MEDICINE

## 2024-09-30 PROCEDURE — 1170F FXNL STATUS ASSESSED: CPT | Performed by: FAMILY MEDICINE

## 2024-09-30 PROCEDURE — G0439 PPPS, SUBSEQ VISIT: HCPCS | Performed by: FAMILY MEDICINE

## 2024-09-30 PROCEDURE — 3078F DIAST BP <80 MM HG: CPT | Performed by: FAMILY MEDICINE

## 2024-09-30 PROCEDURE — 1160F RVW MEDS BY RX/DR IN RCRD: CPT | Performed by: FAMILY MEDICINE

## 2024-09-30 PROCEDURE — 99214 OFFICE O/P EST MOD 30 MIN: CPT | Performed by: FAMILY MEDICINE

## 2024-09-30 PROCEDURE — 1125F AMNT PAIN NOTED PAIN PRSNT: CPT | Performed by: FAMILY MEDICINE

## 2024-09-30 RX ORDER — ATORVASTATIN CALCIUM 80 MG/1
40 TABLET, FILM COATED ORAL
Qty: 45 TABLET | Refills: 1 | Status: SHIPPED | OUTPATIENT
Start: 2024-09-30

## 2024-09-30 RX ORDER — RAMIPRIL 10 MG/1
10 CAPSULE ORAL DAILY
Qty: 90 CAPSULE | Refills: 1 | Status: SHIPPED | OUTPATIENT
Start: 2024-09-30

## 2024-09-30 RX ORDER — CLOPIDOGREL BISULFATE 75 MG/1
75 TABLET ORAL DAILY
Qty: 90 TABLET | Refills: 1 | Status: SHIPPED | OUTPATIENT
Start: 2024-09-30

## 2024-09-30 NOTE — PROGRESS NOTES
Subjective   The ABCs of the Annual Wellness Visit  Medicare Wellness Visit      Anabell Vera is a 84 y.o. patient who presents for a Medicare Wellness Visit.    Patient has had vitamin D deficiency in the past.  Last vit D  lab was okay.  Patient has a single kidney and has mildly decreased GFR.  Pt is on low carb diet.   Pt has colonoscopy in a few weeks with Dr Mattson.   Pt has been seeing Dr Marr for hx of radiation therapy and hysterectomy with bleeding.  Pt has appt with Dr King in November to be fitted with pessary.   The following portions of the patient's history were reviewed and   updated as appropriate: allergies, current medications, past family history, past medical history, past social history, past surgical history, and problem list.  Past Medical History:   Diagnosis Date    Acquired female bladder prolapse 01/2022    Annual GYN exam in  04/07/2022    Carpal tunnel syndrome     BILATERAL    Diverticulosis of colon     Eczema 03/21/2018    Endometrial cancer 03/29/2023    Esophageal reflux     Female bladder prolapse 01/2022    GERD (gastroesophageal reflux disease)     Hearing decreased, bilateral     Hematuria, microscopic 2012    W/u by urology was (-)    Hiatal hernia     High cholesterol 2016    Started Rx after TIA    History of brachytherapy 08/28/2023    vaginal brachytherapy    History of radiation therapy 08/2023    proximal vagina for endometrial CA    History of radiation therapy 5/8/2024    Hypertension 2016    Hyperthyroidism 1985    Resolved w/o Tx after ~ 6 months    Osteopenia     PFO (patent foramen ovale) 10/2016    per echo    Positive TB test     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    Right kidney mass 2016    found by Dr Cameron Hall    Single kidney 01/26/2018    TIA (transient ischemic attack) 10/2016    Wears glasses     READERS       Past Surgical History:   Procedure Laterality Date    BASAL CELL CARCINOMA EXCISION  2006    left temple    COLONOSCOPY   10/10/2019    Dr Mattson, 5 yr repeat family hx colon ca    LAPAROSCOPIC CHOLECYSTECTOMY  2012    LAPAROSCOPIC NEPHRECTOMY Right 2018    Oncocytoma    OOPHORECTOMY      TOTAL LAPAROSCOPIC HYSTERECTOMY SALPINGO OOPHORECTOMY N/A 2023    Procedure: TOTAL LAPAROSCOPIC HYSTERECTOMY BILATERAL SALPINGO-OOPHORECTOMY, INJECTION FOR SENTINEL LYMPH NODE MAPPING, BILATERAL SENTINEL LYMPH NODE DISSECTION WITH DAVINCI ROBOT;  Surgeon: Lindsay Kingston MD;  Location: [facility];  Service: Robotics - DaVinci;  Laterality: N/A;    WISDOM TOOTH EXTRACTION         Allergies   Allergen Reactions    Motrin [Ibuprofen] Other (See Comments)     ONLY HAS ONE KIDNEY, NO IBUPROFEN    Wheat Rash     Gluten    Amoxicillin Rash    Garlic Itching    Latex Rash    Penicillins Rash       Family History   Problem Relation Age of Onset    Cancer Father     Colon cancer Father     Pneumonia Father     Arthritis Mother     Diabetes Mother     Heart failure Mother     Diabetes Brother     Heart attack Brother     Cancer Brother     Colon polyps Brother     Prostate cancer Brother     Cancer Brother         on lips    Diabetes Sister     Heart attack Sister     Multiple sclerosis Sister     Diabetes Son     Diabetes Maternal Grandmother     Cervical cancer Maternal Grandmother     Breast cancer Other     Ovarian cancer Neg Hx        Social History     Socioeconomic History    Marital status:    Tobacco Use    Smoking status: Former     Current packs/day: 0.00     Average packs/day: 0.3 packs/day for 2.0 years (0.5 ttl pk-yrs)     Types: Cigarettes     Start date:      Quit date:      Years since quittin.7    Smokeless tobacco: Never    Tobacco comments:     2 years only   Vaping Use    Vaping status: Never Used   Substance and Sexual Activity    Alcohol use: Yes     Comment: rarely drinks wine    Drug use: No    Sexual activity: Not Currently     Partners: Male         Compared to one year ago, the patient's  physical   health is better.  Compared to one year ago, the patient's mental   health is the same.    Recent Hospitalizations:  She was not admitted to the hospital during the last year.     Current Medical Providers:  Patient Care Team:  Alayna Toscano MD as PCP - General (Family Medicine)  Jenny Turner MD as Consulting Physician (Dermatology)  Osman King MD as Obstetrician (Obstetrics and Gynecology)  Cameron Mattson MD as Consulting Physician (Colon and Rectal Surgery)  Yoly Collins MD as Consulting Physician (Cardiology)  Alayna Toscano MD as Consulting Physician (Family Medicine)  Patty Hyde MD as Consulting Physician (Radiation Oncology)  Suzette Marr MD as Consulting Physician (Gynecology)    Outpatient Medications Prior to Visit   Medication Sig Dispense Refill    acetaminophen (TYLENOL) 325 MG tablet Take 2 tablets by mouth Every 4 (Four) Hours As Needed for Mild Pain. 60 tablet 0    Calcium-Magnesium-Vitamin D 600-300-400 liquid Take  by mouth Daily. 2 tbsp daily      fluticasone (FLONASE) 50 MCG/ACT nasal spray Administer 2 sprays into the nostril(s) as directed by provider Daily.      hydrocortisone 1 % cream As Needed for Irritation or Rash. PRN      Multiple Vitamins-Minerals (MULTI VITAMIN/MINERALS PO) Take 1 tablet by mouth Daily.      atorvastatin (LIPITOR) 80 MG tablet Take 0.5 tablets by mouth every night at bedtime. 45 tablet 0    clopidogrel (PLAVIX) 75 MG tablet Take 1 tablet by mouth Daily. 90 tablet 0    ramipril (ALTACE) 10 MG capsule Take 1 capsule by mouth Daily. 90 capsule 0     No facility-administered medications prior to visit.     No opioid medication identified on active medication list. I have reviewed chart for other potential  high risk medication/s and harmful drug interactions in the elderly.      Aspirin is not on active medication list.  Aspirin use is contraindicated for this patient due to: current use of clopidogrel.  .    Patient  "Active Problem List   Diagnosis    Primary hypertension    Gastroesophageal reflux disease with esophagitis    Vitamin D deficiency    Mixed hyperlipidemia    PFO (patent foramen ovale)    Anticoagulant long-term use    Single kidney    Diverticulosis of large intestine without hemorrhage    Eczema    Annual GYN exam in [place]    Cystocele, midline    Endometrial cancer    History of radiation therapy    Paraspinal muscle spasm     Advance Care Planning Advance Directive is on file.  ACP discussion was held with the patient during this visit. Patient has an advance directive in EMR which is still valid.             Objective   Vitals:    24 0938   BP: 130/72   BP Location: Right arm   Patient Position: Sitting   Cuff Size: Adult   Pulse: 65   Temp: 97.7 °F (36.5 °C)   SpO2: 98%   Weight: 62.1 kg (137 lb)   Height: 157.5 cm (62\")   PainSc:   4   PainLoc: Back  Comment: back muscle       Estimated body mass index is 25.06 kg/m² as calculated from the following:    Height as of this encounter: 157.5 cm (62\").    Weight as of this encounter: 62.1 kg (137 lb).            Does the patient have evidence of cognitive impairment? No  Lab Results   Component Value Date    TRIG 88 2024    HDL 47 2024    LDL 53 2024    VLDL 17 2024                                                                                                Health  Risk Assessment    Smoking Status:  Social History     Tobacco Use   Smoking Status Former    Current packs/day: 0.00    Average packs/day: 0.3 packs/day for 2.0 years (0.5 ttl pk-yrs)    Types: Cigarettes    Start date:     Quit date:     Years since quittin.7   Smokeless Tobacco Never   Tobacco Comments    2 years only     Alcohol Consumption:  Social History     Substance and Sexual Activity   Alcohol Use Yes    Comment: rarely drinks wine       Fall Risk Screen  STEADI Fall Risk Assessment was completed, and patient is at LOW risk for falls.Assessment " completed on:2024    Depression Screenin/30/2024     9:43 AM   PHQ-2/PHQ-9 Depression Screening   Little Interest or Pleasure in Doing Things 0-->not at all   Feeling Down, Depressed or Hopeless 0-->not at all   PHQ-9: Brief Depression Severity Measure Score 0     Health Habits and Functional and Cognitive Screenin/30/2024     9:43 AM   Functional & Cognitive Status   Do you have difficulty preparing food and eating? No   Do you have difficulty bathing yourself, getting dressed or grooming yourself? No   Do you have difficulty using the toilet? No   Do you have difficulty moving around from place to place? No   Do you have trouble with steps or getting out of a bed or a chair? No   Current Diet Well Balanced Diet   Dental Exam Up to date   Eye Exam Not up to date   Exercise (times per week) 3 times per week   Current Exercises Include Walking;Yard Work   Do you need help using the phone?  No   Are you deaf or do you have serious difficulty hearing?  Yes   Do you need help to go to places out of walking distance? No   Do you need help shopping? No   Do you need help preparing meals?  No   Do you need help with housework?  No   Do you need help with laundry? No   Do you need help taking your medications? No   Do you need help managing money? No   Do you ever drive or ride in a car without wearing a seat belt? No   Have you felt unusual stress, anger or loneliness in the last month? No   Who do you live with? Alone   If you need help, do you have trouble finding someone available to you? No   Have you been bothered in the last four weeks by sexual problems? No   Do you have difficulty concentrating, remembering or making decisions? No           Age-appropriate Screening Schedule:  Refer to the list below for future screening recommendations based on patient's age, sex and/or medical conditions. Orders for these recommended tests are listed in the plan section. The patient has been provided with a  written plan.    Health Maintenance List  Health Maintenance   Topic Date Due    ZOSTER VACCINE (1 of 2) Never done    Pneumococcal Vaccine 65+ (2 of 2 - PPSV23 or PCV20) 12/30/2016    COVID-19 Vaccine (7 - 2023-24 season) 09/01/2024    INFLUENZA VACCINE  08/01/2024    COLORECTAL CANCER SCREENING  10/10/2024    LIPID PANEL  07/18/2025    ANNUAL WELLNESS VISIT  09/30/2025    BMI FOLLOWUP  09/30/2025    MAMMOGRAM  01/09/2026    DXA SCAN  01/23/2026    TDAP/TD VACCINES (2 - Td or Tdap) 06/15/2028    RSV Vaccine - Adults  Completed                                                                                                                                                CMS Preventative Services Quick Reference  Risk Factors Identified During Encounter  Hearing Problem:  pt has hearing aids  Immunizations Discussed/Encouraged: Influenza, Prevnar 20 (Pneumococcal 20-valent conjugate), Shingrix, and COVID19  Polypharmacy: Medication List reviewed and Medications are appropriate for patient    The above risks/problems have been discussed with the patient.  Pertinent information has been shared with the patient in the After Visit Summary.  An After Visit Summary and PPPS were made available to the patient.    Follow Up:   Next Medicare Wellness visit to be scheduled in 1 year.         Additional E&M Note during same encounter follows:  Patient has additional, significant, and separately identifiable condition(s)/problem(s) that require work above and beyond the Medicare Wellness Visit     Chief Complaint  Medicare Wellness-subsequent    Subjective   Hypertension  This is a chronic problem. The current episode started more than 1 year ago. The problem is unchanged. The problem is controlled. Pertinent negatives include no anxiety, blurred vision, chest pain, headaches, malaise/fatigue, neck pain, orthopnea, palpitations, peripheral edema, PND, shortness of breath or sweats. There are no associated agents to hypertension.  Risk factors for coronary artery disease include dyslipidemia, post-menopausal state and family history. Past treatments include ACE inhibitors. Current antihypertension treatment includes ACE inhibitors. The current treatment provides significant improvement. There are no compliance problems.  Hypertensive end-organ damage includes kidney disease and CVA. There is no history of angina, CAD/MI, heart failure, left ventricular hypertrophy, PVD or retinopathy. Identifiable causes of hypertension include chronic renal disease. There is no history of sleep apnea or a thyroid problem.   Hyperlipidemia  This is a chronic problem. The current episode started more than 1 year ago. The problem is controlled. Recent lipid tests were reviewed and are normal. Exacerbating diseases include chronic renal disease. She has no history of diabetes, hypothyroidism, liver disease, obesity or nephrotic syndrome. There are no known factors aggravating her hyperlipidemia. Associated symptoms include myalgias (on back). Pertinent negatives include no chest pain, focal sensory loss, focal weakness, leg pain or shortness of breath. Current antihyperlipidemic treatment includes statins. The current treatment provides significant improvement of lipids. There are no compliance problems.  Risk factors for coronary artery disease include dyslipidemia, family history, hypertension and post-menopausal.   Cerebrovascular Accident  This is a chronic problem. The current episode started more than 1 year ago. The problem occurs rarely. The problem has been rapidly improving. Associated symptoms include myalgias (on back). Pertinent negatives include no abdominal pain, anorexia, arthralgias, change in bowel habit, chest pain, chills, congestion, coughing, diaphoresis, fatigue, fever, headaches, joint swelling, nausea, neck pain, numbness, rash, sore throat, swollen glands, urinary symptoms, vertigo, visual change, vomiting or weakness. Nothing  aggravates the symptoms. Treatments tried: PLAVIX. The treatment provided significant relief.     Anabell is also being seen today for an annual adult preventative physical exam.  and Anabell is also being seen today for additional medical problem/s.    Review of Systems   Constitutional:  Negative for activity change, appetite change, chills, diaphoresis, fatigue, fever and malaise/fatigue.   HENT:  Positive for hearing loss (hearing aids) and tinnitus. Negative for congestion, dental problem, drooling, ear discharge, ear pain, facial swelling, mouth sores, nosebleeds, postnasal drip, rhinorrhea, sinus pressure, sneezing, sore throat, swollen glands, trouble swallowing and voice change.    Eyes:  Negative for blurred vision, photophobia, pain, discharge, redness, itching and visual disturbance.   Respiratory:  Negative for apnea, cough, choking, chest tightness, shortness of breath, wheezing and stridor.    Cardiovascular:  Negative for chest pain, palpitations, orthopnea, leg swelling and PND.   Gastrointestinal:  Negative for abdominal distention, abdominal pain, anal bleeding, anorexia, blood in stool, change in bowel habit, constipation, diarrhea, nausea, rectal pain and vomiting.   Endocrine: Negative for cold intolerance, heat intolerance, polydipsia, polyphagia and polyuria.   Genitourinary:  Positive for frequency and vaginal discharge (from radiation). Negative for decreased urine volume, difficulty urinating, dyspareunia, dysuria, enuresis, flank pain, genital sores, hematuria, menstrual problem, pelvic pain, urgency, vaginal bleeding and vaginal pain.   Musculoskeletal:  Positive for back pain (muscle pain) and myalgias (on back). Negative for arthralgias, gait problem, joint swelling, neck pain and neck stiffness.   Skin:  Negative for color change, pallor, rash and wound.   Allergic/Immunologic: Positive for food allergies (garlic and wheat). Negative for environmental allergies and immunocompromised state.  "  Neurological:  Negative for dizziness, vertigo, tremors, focal weakness, seizures, syncope, facial asymmetry, speech difficulty, weakness, light-headedness, numbness and confusion.   Hematological:  Negative for adenopathy. Does not bruise/bleed easily.   Psychiatric/Behavioral:  Positive for sleep disturbance (up to bathroom every 1-2 hours). Negative for agitation, behavioral problems, decreased concentration, dysphoric mood, hallucinations, self-injury and suicidal ideas. The patient is not nervous/anxious and is not hyperactive.         Pt has stopped eating sugar and is working on weight loss.       Objective   Vital Signs:  /72 (BP Location: Right arm, Patient Position: Sitting, Cuff Size: Adult)   Pulse 65   Temp 97.7 °F (36.5 °C)   Ht 157.5 cm (62\")   Wt 62.1 kg (137 lb)   SpO2 98%   BMI 25.06 kg/m²     Wt Readings from Last 3 Encounters:   09/30/24 62.1 kg (137 lb)   08/07/24 63.4 kg (139 lb 11.2 oz)   05/08/24 63.5 kg (140 lb 1.6 oz)       Physical Exam  Vitals and nursing note reviewed.   Constitutional:       General: She is not in acute distress.     Appearance: She is well-developed. She is not diaphoretic.   HENT:      Head: Normocephalic and atraumatic.      Right Ear: Tympanic membrane, ear canal and external ear normal. Decreased hearing noted.      Left Ear: Tympanic membrane, ear canal and external ear normal. Decreased hearing noted.      Ears:      Comments: Bilateral hearing aids     Nose: Nose normal.      Mouth/Throat:      Lips: Pink.      Mouth: Mucous membranes are moist. Mucous membranes are not pale, not dry and not cyanotic. No injury.      Dentition: Normal dentition.      Tongue: No lesions.      Palate: No mass.      Pharynx: Uvula midline. No pharyngeal swelling, oropharyngeal exudate, posterior oropharyngeal erythema or uvula swelling.   Eyes:      General: Lids are normal. No scleral icterus.        Right eye: No foreign body, discharge or hordeolum.         Left " eye: No foreign body, discharge or hordeolum.      Extraocular Movements:      Right eye: Normal extraocular motion and no nystagmus.      Left eye: Normal extraocular motion and no nystagmus.      Conjunctiva/sclera: Conjunctivae normal.      Right eye: Right conjunctiva is not injected. No chemosis, exudate or hemorrhage.     Left eye: Left conjunctiva is not injected. No chemosis, exudate or hemorrhage.     Pupils: Pupils are equal, round, and reactive to light.   Neck:      Thyroid: No thyroid mass or thyromegaly.      Trachea: Trachea normal. No tracheal deviation.   Cardiovascular:      Rate and Rhythm: Normal rate and regular rhythm.      Pulses:           Dorsalis pedis pulses are 2+ on the right side and 2+ on the left side.        Posterior tibial pulses are 2+ on the right side and 2+ on the left side.      Heart sounds: Normal heart sounds. No murmur heard.     No friction rub. No gallop.   Pulmonary:      Effort: Pulmonary effort is normal. No respiratory distress.      Breath sounds: Normal breath sounds. No decreased breath sounds, wheezing, rhonchi or rales.   Chest:      Chest wall: No tenderness. There is no dullness to percussion.   Breasts:     Elier Score is 5.      Breasts are symmetrical.      Right: Normal. No swelling, bleeding, inverted nipple, mass, nipple discharge, skin change or tenderness.      Left: Normal. No swelling, bleeding, inverted nipple, mass, nipple discharge, skin change or tenderness.   Abdominal:      General: Bowel sounds are normal. There is no distension.      Palpations: Abdomen is soft. There is no hepatomegaly, splenomegaly or mass.      Tenderness: There is no abdominal tenderness. There is no guarding or rebound.      Hernia: No hernia is present.   Musculoskeletal:         General: No tenderness or deformity. Normal range of motion.      Cervical back: Normal range of motion and neck supple.      Right lower leg: No edema.      Left lower leg: No edema.    Lymphadenopathy:      Head:      Right side of head: No submandibular adenopathy.      Left side of head: No submandibular adenopathy.      Cervical: No cervical adenopathy.      Right cervical: No superficial, deep or posterior cervical adenopathy.     Left cervical: No superficial, deep or posterior cervical adenopathy.      Upper Body:      Right upper body: No supraclavicular, axillary or pectoral adenopathy.      Left upper body: No supraclavicular, axillary or pectoral adenopathy.   Skin:     General: Skin is warm and dry.      Findings: No rash.      Nails: There is no clubbing.   Neurological:      Mental Status: She is alert and oriented to person, place, and time.      Cranial Nerves: No cranial nerve deficit.      Sensory: No sensory deficit.      Coordination: Coordination normal.      Deep Tendon Reflexes: Reflexes are normal and symmetric.      Reflex Scores:       Bicep reflexes are 2+ on the right side and 2+ on the left side.       Brachioradialis reflexes are 2+ on the right side and 2+ on the left side.       Patellar reflexes are 2+ on the right side and 2+ on the left side.  Psychiatric:         Speech: Speech normal.         Behavior: Behavior normal.         Thought Content: Thought content normal.         Judgment: Judgment normal.                 Assessment and Plan Additional age appropriate preventative wellness advice topics were discussed during today's preventative wellness exam(some topics already addressed during AWV portion of the note above):    Physical Activity: Advised cardiovascular activity 150 minutes per week as tolerated. (example brisk walk for 30 minutes, 5 days a week).     Nutrition: Discussed nutrition plan with patient. Information shared in after visit summary. Goal is for a well balanced diet to enhance overall health.     Healthy Weight: Discussed current and goal BMI with patient. Steps to attain this goal discussed. Information shared in after visit summary.      Motor Vehicle Safety Discussion:  Wearing Seatbelt While in Motor Vehicle recommendation. Adhering to posted speed limit recommendation.     Injury Prevention Discussion:  Information shared in after visit summary.                    Primary hypertension  Hypertension is stable and controlled  Continue current treatment regimen.  Blood pressure will be reassessed in 6 months.  Mixed hyperlipidemia   Lipid abnormalities are stable    Plan:  Continue same medication/s without change.      Discussed medication dosage, use, side effects, and goals of treatment in detail.    Counseled patient on lifestyle modifications to help control hyperlipidemia.     Patient Treatment Goals:   LDL goal is less than 70    Followup in 6 months.  Vitamin D deficiency    Acute ischemic CVA with resolved right sided deficits    Anemia, unspecified type    No orders of the defined types were placed in this encounter.    New Medications Ordered This Visit   Medications    ramipril (ALTACE) 10 MG capsule     Sig: Take 1 capsule by mouth Daily.     Dispense:  90 capsule     Refill:  1    clopidogrel (PLAVIX) 75 MG tablet     Sig: Take 1 tablet by mouth Daily.     Dispense:  90 tablet     Refill:  1    atorvastatin (LIPITOR) 80 MG tablet     Sig: Take 0.5 tablets by mouth every night at bedtime.     Dispense:  45 tablet     Refill:  1        I spent 45 minutes caring for Anabell on this date of service. This time includes time spent by me in the following activities:preparing for the visit, reviewing tests, obtaining and/or reviewing a separately obtained history, performing a medically appropriate examination and/or evaluation , counseling and educating the patient/family/caregiver, ordering medications, tests, or procedures, and documenting information in the medical record  Follow Up   Return in about 6 months (around 3/30/2025), or if symptoms worsen or fail to improve, for Recheck bp and sodium level in 3-4 months.  Patient was given  instructions and counseling regarding her condition or for health maintenance advice. Please see specific information pulled into the AVS if appropriate.    Please follow a low animal fat diet that is also low in sugar, low in junk food, low in sweet drinks and low in alcohol.  Please increase the amount of fiber in your diet as well as increasing your daily exercise, such as walking.    Handouts to pt on Fall risk, advance care directives, healthy diets such as Mediterranean or Dash or calorie counting, and healthy exercising.

## 2024-09-30 NOTE — TELEPHONE ENCOUNTER
RN called patient to let her know that in order to be cleared for surgery, Dr. Toscano wants her to make an appointment with her office beforehand. Patient verbalized understanding and said she would give her office a call to make an appointment.   
age appropriate assistance

## 2024-10-09 ENCOUNTER — TELEPHONE (OUTPATIENT)
Dept: INTERNAL MEDICINE | Facility: CLINIC | Age: 84
End: 2024-10-09
Payer: MEDICARE

## 2024-10-09 NOTE — TELEPHONE ENCOUNTER
Caller: Anabell Vera    Relationship: Self    Best call back number: [phone]    Which medication are you concerned about: CLOPIDIOGRIL     Who prescribed you this medication: DOCTOR PEPPER      What are your concerns: THE PATIENT IS HAVING A COLONOSCOPY ON 10/14/2024 THE PATIENT STATES THAT SHE WAS SUPPOSED TO GO OFF BLOOD THINNERS 7 DAYS BEFORE THE PROCEDURE THE PATIENT WOULD LIKE TO KNOW IF IT IS OK TO STOP THE MEDICATION FOR HER PROCEDURE

## 2024-10-09 NOTE — TELEPHONE ENCOUNTER
Called and spoke with patient, informed her that it is ok to follow the GI recommendations for discontinuing the Plavix before procedure. She verbalized understanding and had no further questions.

## 2024-10-16 ENCOUNTER — TELEPHONE (OUTPATIENT)
Dept: GYNECOLOGIC ONCOLOGY | Facility: CLINIC | Age: 84
End: 2024-10-16
Payer: MEDICARE

## 2024-10-16 NOTE — TELEPHONE ENCOUNTER
Caller: Anabell Vera    Relationship to patient: Self    Best call back number:     [phone]     Chief complaint: PT NEEDS TO CANCEL APPT FOR 11/07 UNTIL AFTER SHE SEES HER REGULAR OB    Type of visit: FOLLOW UP    Requested date: PT WILL CALL BACK TO R/S AFTER SHE SEE OB ON  11/18/24    If rescheduling, when is the original appointment: 11/07/24

## 2024-11-18 ENCOUNTER — OFFICE VISIT (OUTPATIENT)
Dept: OBSTETRICS AND GYNECOLOGY | Facility: CLINIC | Age: 84
End: 2024-11-18
Payer: MEDICARE

## 2024-11-18 VITALS — WEIGHT: 136 LBS | BODY MASS INDEX: 24.87 KG/M2 | RESPIRATION RATE: 14 BRPM

## 2024-11-18 DIAGNOSIS — N76.0 RADIATION VAGINITIS: Primary | ICD-10-CM

## 2024-11-18 DIAGNOSIS — N81.10 CYSTOCELE WITH RECTOCELE: ICD-10-CM

## 2024-11-18 DIAGNOSIS — N81.6 CYSTOCELE WITH RECTOCELE: ICD-10-CM

## 2024-11-18 PROCEDURE — 99213 OFFICE O/P EST LOW 20 MIN: CPT | Performed by: OBSTETRICS & GYNECOLOGY

## 2024-11-18 NOTE — Clinical Note
Hope, A cystocele I do not think the symptoms she is bothered by have anything to do with her cystocele.  Although she has there is some atrophy in the vagina and some changes that I would anticipate have more to do with the brachytherapy she received.  I really do not think placing a pessary is can to help improve the symptoms she is having and I do worry about vaginal ulceration or irritation from the pessary given her history.  I have asked her to come back and see you to see if there is anything you have to offer as a relates to the mucoid vaginal discharge.

## 2024-11-18 NOTE — PROGRESS NOTES
Subjective   Chief Complaint   Patient presents with    Follow-up     Anabell Vera is a 84 y.o. year old .  No LMP recorded (lmp unknown). Patient has had a hysterectomy.  She has vaginal discharge and irritation that she wonders if it could be from her prolapse.  She did have hysterectomy done about a year and a half ago related to cancer and ended up having radiation after the treatment.  She had brachytherapy as radiation.  She continues to see Dr. Marr for this.  She is really not overly bothered by the symptoms of prolapse but more has to do with the discharge and irritation.  None of this was present prior to radiation.    She has urinary urgency and frequency but this is unchanged from prior to radiation.  She does not leak urine.    The following portions of the patient's history were reviewed and updated as appropriate:current medications and allergies    Social History    Tobacco Use      Smoking status: Former        Packs/day: 0.00        Years: 0.3 packs/day for 2.0 years (0.5 ttl pk-yrs)        Types: Cigarettes        Start date:         Quit date:         Years since quittin.9      Smokeless tobacco: Never      Tobacco comments: 2 years only    Review of Systems  Constitutional POS: nothing reported    NEG: anorexia or night sweats   Genitourinary POS: see HPI    NEG: dysuria or hematuria   Gastointestinal POS: nothing reported    NEG: bloating, change in bowel habits, melena, or reflux symptoms         Objective   Resp 14   Wt 61.7 kg (136 lb)   LMP  (LMP Unknown)   BMI 24.87 kg/m²     General:  well developed; well nourished  no acute distress  mentation appropriate   Pelvis: Clinical staff was present for exam  External genitalia:  normal appearance of the external genitalia including Bartholin's and [place]'s glands.  :  urethral meatus normal;  Vaginal:  atrophic mucosal changes are present;  no significant discharge is seen.  There are no ulcers present in the apex of  the vagina does not appear agglutinated.  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  Cystocele, not significantly symptomatic as best I can tell based on her history     Plan   At this point I do not think pessary would help improve the symptoms she is having  I would defer to Dr. Marr the treatment of her mucoid vaginal discharge which I anticipate relates to radiation vaginitis  The importance of keeping all planned follow-up and taking all medications as prescribed was emphasized.  Follow up PRN             This note was electronically signed.    Osman King M.D.  November 18, 2024    Part of this note may be an electronic transcription/translation of spoken language to printed text using the Dragon Dictation System.

## 2024-11-18 NOTE — PROGRESS NOTES
"Pessary insertion    Date of arffprocedure:  11/18/2024    Risks and benefits discussed? {GEN YES NO:20979}  All questions answered? {GEN YES NO:20979}  Consents given by {consent given:18782}  Written consent obtained? {GEN YES NO:20979}    Pessary (silicone) placed: {Pessary types:19610}       Pessary (silicone) attempted without good fit: {Pessary types:65478}     Post procedure instructions: Call ASAP if increasing pain or trouble passing urine or bowels    Follow up {F/U reason:72397::\"for annual exam\"} *** {follow-up time:22868}    Of note, any pessary that was placed at today's visit was supplied by the office and not brought to us by the patient.    This note was electronically signed.    Osman King M.D.  November 18, 2024  "

## 2025-01-17 ENCOUNTER — OFFICE VISIT (OUTPATIENT)
Dept: INTERNAL MEDICINE | Facility: CLINIC | Age: 85
End: 2025-01-17
Payer: MEDICARE

## 2025-01-17 ENCOUNTER — LAB (OUTPATIENT)
Dept: INTERNAL MEDICINE | Facility: CLINIC | Age: 85
End: 2025-01-17
Payer: MEDICARE

## 2025-01-17 VITALS
WEIGHT: 134 LBS | BODY MASS INDEX: 24.66 KG/M2 | HEART RATE: 58 BPM | SYSTOLIC BLOOD PRESSURE: 138 MMHG | OXYGEN SATURATION: 96 % | DIASTOLIC BLOOD PRESSURE: 70 MMHG | HEIGHT: 62 IN | TEMPERATURE: 97.1 F

## 2025-01-17 DIAGNOSIS — E78.2 MIXED HYPERLIPIDEMIA: ICD-10-CM

## 2025-01-17 DIAGNOSIS — E55.9 VITAMIN D DEFICIENCY: ICD-10-CM

## 2025-01-17 DIAGNOSIS — I10 PRIMARY HYPERTENSION: ICD-10-CM

## 2025-01-17 DIAGNOSIS — D64.9 ANEMIA, UNSPECIFIED TYPE: ICD-10-CM

## 2025-01-17 DIAGNOSIS — H00.012 HORDEOLUM EXTERNUM OF RIGHT LOWER EYELID: Primary | ICD-10-CM

## 2025-01-17 DIAGNOSIS — B35.3 TINEA PEDIS OF LEFT FOOT: ICD-10-CM

## 2025-01-17 LAB
ALBUMIN SERPL-MCNC: 4.3 G/DL (ref 3.5–5.2)
ALBUMIN/GLOB SERPL: 1.5 G/DL
ALP SERPL-CCNC: 62 U/L (ref 39–117)
ALT SERPL W P-5'-P-CCNC: 16 U/L (ref 1–33)
ANION GAP SERPL CALCULATED.3IONS-SCNC: 9.8 MMOL/L (ref 5–15)
AST SERPL-CCNC: 26 U/L (ref 1–32)
BASOPHILS # BLD AUTO: 0.01 10*3/MM3 (ref 0–0.2)
BASOPHILS NFR BLD AUTO: 0.2 % (ref 0–1.5)
BILIRUB SERPL-MCNC: 0.5 MG/DL (ref 0–1.2)
BUN SERPL-MCNC: 29 MG/DL (ref 8–23)
BUN/CREAT SERPL: 21 (ref 7–25)
CALCIUM SPEC-SCNC: 9.8 MG/DL (ref 8.6–10.5)
CHLORIDE SERPL-SCNC: 99 MMOL/L (ref 98–107)
CHOLEST SERPL-MCNC: 136 MG/DL (ref 0–200)
CO2 SERPL-SCNC: 27.2 MMOL/L (ref 22–29)
CREAT SERPL-MCNC: 1.38 MG/DL (ref 0.57–1)
DEPRECATED RDW RBC AUTO: 39.9 FL (ref 37–54)
EGFRCR SERPLBLD CKD-EPI 2021: 38.1 ML/MIN/1.73
EOSINOPHIL # BLD AUTO: 0.06 10*3/MM3 (ref 0–0.4)
EOSINOPHIL NFR BLD AUTO: 1.2 % (ref 0.3–6.2)
ERYTHROCYTE [DISTWIDTH] IN BLOOD BY AUTOMATED COUNT: 12.2 % (ref 12.3–15.4)
GLOBULIN UR ELPH-MCNC: 2.9 GM/DL
GLUCOSE SERPL-MCNC: 93 MG/DL (ref 65–99)
HCT VFR BLD AUTO: 40.3 % (ref 34–46.6)
HDLC SERPL-MCNC: 60 MG/DL (ref 40–60)
HGB BLD-MCNC: 13.9 G/DL (ref 12–15.9)
IMM GRANULOCYTES # BLD AUTO: 0.02 10*3/MM3 (ref 0–0.05)
IMM GRANULOCYTES NFR BLD AUTO: 0.4 % (ref 0–0.5)
IRON 24H UR-MRATE: 112 MCG/DL (ref 37–145)
IRON SATN MFR SERPL: 28 % (ref 20–50)
LDLC SERPL CALC-MCNC: 63 MG/DL (ref 0–100)
LDLC/HDLC SERPL: 1.06 {RATIO}
LYMPHOCYTES # BLD AUTO: 1.48 10*3/MM3 (ref 0.7–3.1)
LYMPHOCYTES NFR BLD AUTO: 28.7 % (ref 19.6–45.3)
MCH RBC QN AUTO: 31.2 PG (ref 26.6–33)
MCHC RBC AUTO-ENTMCNC: 34.5 G/DL (ref 31.5–35.7)
MCV RBC AUTO: 90.4 FL (ref 79–97)
MONOCYTES # BLD AUTO: 0.91 10*3/MM3 (ref 0.1–0.9)
MONOCYTES NFR BLD AUTO: 17.6 % (ref 5–12)
NEUTROPHILS NFR BLD AUTO: 2.68 10*3/MM3 (ref 1.7–7)
NEUTROPHILS NFR BLD AUTO: 51.9 % (ref 42.7–76)
NRBC BLD AUTO-RTO: 0 /100 WBC (ref 0–0.2)
PLATELET # BLD AUTO: 151 10*3/MM3 (ref 140–450)
PMV BLD AUTO: 11.7 FL (ref 6–12)
POTASSIUM SERPL-SCNC: 4.6 MMOL/L (ref 3.5–5.2)
PROT SERPL-MCNC: 7.2 G/DL (ref 6–8.5)
RBC # BLD AUTO: 4.46 10*6/MM3 (ref 3.77–5.28)
SODIUM SERPL-SCNC: 136 MMOL/L (ref 136–145)
TIBC SERPL-MCNC: 393 MCG/DL (ref 298–536)
TRANSFERRIN SERPL-MCNC: 264 MG/DL (ref 200–360)
TRIGL SERPL-MCNC: 63 MG/DL (ref 0–150)
VLDLC SERPL-MCNC: 13 MG/DL (ref 5–40)
WBC NRBC COR # BLD AUTO: 5.16 10*3/MM3 (ref 3.4–10.8)

## 2025-01-17 PROCEDURE — 36415 COLL VENOUS BLD VENIPUNCTURE: CPT | Performed by: FAMILY MEDICINE

## 2025-01-17 PROCEDURE — 84466 ASSAY OF TRANSFERRIN: CPT | Performed by: FAMILY MEDICINE

## 2025-01-17 PROCEDURE — 82306 VITAMIN D 25 HYDROXY: CPT | Performed by: FAMILY MEDICINE

## 2025-01-17 PROCEDURE — 80061 LIPID PANEL: CPT | Performed by: FAMILY MEDICINE

## 2025-01-17 PROCEDURE — 80053 COMPREHEN METABOLIC PANEL: CPT | Performed by: FAMILY MEDICINE

## 2025-01-17 PROCEDURE — 85025 COMPLETE CBC W/AUTO DIFF WBC: CPT | Performed by: FAMILY MEDICINE

## 2025-01-17 PROCEDURE — 83540 ASSAY OF IRON: CPT | Performed by: FAMILY MEDICINE

## 2025-01-17 RX ORDER — KETOCONAZOLE 20 MG/G
1 CREAM TOPICAL NIGHTLY
Qty: 30 G | Refills: 0 | Status: SHIPPED | OUTPATIENT
Start: 2025-01-17

## 2025-01-17 RX ORDER — CIPROFLOXACIN HYDROCHLORIDE 3.5 MG/ML
1 SOLUTION/ DROPS TOPICAL 4 TIMES DAILY
Qty: 2.5 ML | Refills: 0 | Status: SHIPPED | OUTPATIENT
Start: 2025-01-17 | End: 2025-01-24

## 2025-01-17 NOTE — PROGRESS NOTES
"Subjective   Anabell Vera is a 84 y.o. female.     Chief Complaint   Patient presents with    Stye     Right eye 10 days.  Using previous med () polymyxin-B/trimethoprimophth Sol    Nail Problem     Toenail fungus, fungus under foot. Bilateral great toe-intermittent using a toenail fungus liquid.  Fungus under left foot        Visit Vitals  /70 (BP Location: Right arm, Patient Position: Sitting, Cuff Size: Small Adult)   Pulse 58   Temp 97.1 °F (36.2 °C)   Ht 157.5 cm (62\")   Wt 60.8 kg (134 lb)   LMP  (LMP Unknown)   SpO2 96%   BMI 24.51 kg/m²         History of Present Illness   Pt used left over eye antibiotic(Polytrim) drop on right eye from 2 yrs ago.     Pt has scaly rash on plantar aspect of the left foot. Pt tried otc athletes foot cream without improvement  Pt is using topical fungus soln on toenail fungus.     Pt going to [facility] soon.   Patient should check [email or web address] website for information on international travel.  The following portions of the patient's history were reviewed and updated as appropriate: allergies, current medications, past family history, past medical history, past social history, past surgical history, and problem list.    Past Medical History:   Diagnosis Date    Acquired female bladder prolapse 2022    Eczema 2018    Endometrial cancer 2023    Female bladder prolapse 2022    Hematuria, microscopic     W/u by urology was (-)    High cholesterol     Started Rx after TIA    History of brachytherapy 2023    vaginal brachytherapy    History of radiation therapy 2023    proximal vagina for endometrial CA    Hypertension     Hyperthyroidism 1985    Resolved w/o Tx after ~ 6 months    PFO (patent foramen ovale) 10/2016    per echo    Renal oncocytoma of right kidney 2018    Right kidney mass 2016    found by Dr Cameron Hall    Single kidney 2018    TIA (transient ischemic attack) 10/2016      Past Surgical History:   Procedure " Laterality Date    BASAL CELL CARCINOMA EXCISION  2006    left temple    COLONOSCOPY  10/10/2019    Dr Mattson, 5 yr repeat family hx colon ca    COLONOSCOPY W/ POLYPECTOMY  10/14/2024    Dr Mattson, hyperplastic polyp    LAPAROSCOPIC CHOLECYSTECTOMY  2012    LAPAROSCOPIC NEPHRECTOMY Right 2018    Oncocytoma    TOTAL LAPAROSCOPIC HYSTERECTOMY SALPINGO OOPHORECTOMY N/A 2023    Procedure: TOTAL LAPAROSCOPIC HYSTERECTOMY BILATERAL SALPINGO-OOPHORECTOMY, INJECTION FOR SENTINEL LYMPH NODE MAPPING, BILATERAL SENTINEL LYMPH NODE DISSECTION WITH DAVINCI ROBOT;  Surgeon: Lindsay Kingston MD;  Location: [facility];  Service: Robotics - DaVinci;  Laterality: N/A;    WISDOM TOOTH EXTRACTION        Family History   Problem Relation Age of Onset    Cancer Father     Colon cancer Father     Pneumonia Father     Arthritis Mother     Diabetes Mother     Heart failure Mother     Diabetes Brother     Heart attack Brother     Cancer Brother     Colon polyps Brother     Prostate cancer Brother     Cancer Brother         on lips    Diabetes Sister     Heart attack Sister     Multiple sclerosis Sister     Diabetes Son     Diabetes Maternal Grandmother     Cervical cancer Maternal Grandmother     Breast cancer Other     Ovarian cancer Neg Hx       Social History     Socioeconomic History    Marital status:    Tobacco Use    Smoking status: Former     Current packs/day: 0.00     Average packs/day: 0.3 packs/day for 2.0 years (0.5 ttl pk-yrs)     Types: Cigarettes     Start date:      Quit date:      Years since quittin.0    Smokeless tobacco: Never    Tobacco comments:     2 years only   Vaping Use    Vaping status: Never Used   Substance and Sexual Activity    Alcohol use: Yes     Comment: rarely drinks wine    Drug use: No    Sexual activity: Not Currently     Partners: Male      Allergies   Allergen Reactions    Motrin [Ibuprofen] Other (See Comments)     ONLY HAS ONE KIDNEY, NO IBUPROFEN    Wheat Rash      Gluten    Amoxicillin Rash    Garlic Itching    Latex Rash    Penicillins Rash       Review of Systems   Constitutional:  Negative for chills, diaphoresis, fatigue and fever.   HENT:  Negative for congestion and sore throat.    Eyes:  Positive for redness (right with sty right lateral lower lid).   Respiratory:  Negative for cough.    Cardiovascular:  Negative for chest pain.   Gastrointestinal:  Negative for abdominal pain, nausea and vomiting.   Genitourinary:  Negative for dysuria.   Musculoskeletal:  Negative for myalgias and neck pain.   Skin:  Positive for rash (left plantar foot).   Neurological:  Negative for weakness, numbness and headaches.       Objective   Physical Exam  Vitals and nursing note reviewed.   Constitutional:       Appearance: She is well-developed.   HENT:      Head: Normocephalic.      Right Ear: External ear normal.      Left Ear: External ear normal.      Nose: Nose normal.   Eyes:      General: Lids are normal.         Right eye: Hordeolum present.      Conjunctiva/sclera: Conjunctivae normal.      Right eye: Right conjunctiva is injected.      Pupils: Pupils are equal, round, and reactive to light.        Comments: Sty right lower lid   Neck:      Thyroid: No thyroid mass or thyromegaly.      Vascular: No carotid bruit.      Trachea: Trachea normal.   Cardiovascular:      Rate and Rhythm: Normal rate and regular rhythm.      Heart sounds: No murmur heard.  Pulmonary:      Effort: Pulmonary effort is normal. No respiratory distress.      Breath sounds: Normal breath sounds. No decreased breath sounds, wheezing, rhonchi or rales.   Chest:      Chest wall: No tenderness.   Abdominal:      General: Bowel sounds are normal.      Palpations: Abdomen is soft.      Tenderness: There is no abdominal tenderness.   Musculoskeletal:         General: Normal range of motion.      Cervical back: Normal range of motion and neck supple.        Feet:    Feet:      Comments: Lillie arch of left  foot  Skin:     General: Skin is warm and dry.   Neurological:      Mental Status: She is alert and oriented to person, place, and time.   Psychiatric:         Behavior: Behavior normal.         Assessment & Plan   Problems Addressed this Visit    None  Visit Diagnoses       Hordeolum externum of right lower eyelid    -  Primary    Relevant Medications    ciprofloxacin (Ciloxan) 0.3 % ophthalmic solution    Tinea pedis of left foot        Relevant Medications    ketoconazole (NIZORAL) 2 % cream          Diagnoses         Codes Comments    Hordeolum externum of right lower eyelid    -  Primary ICD-10-CM: H00.012  ICD-9-CM: 373.11     Tinea pedis of left foot     ICD-10-CM: B35.3  ICD-9-CM: 110.4           Continue topical antifungal on the toenails until they are fully grown out.  Use the ketoconazole cream for at least 2 weeks.  Use the Cipro eyedrops for 5 to 7 days.             Current Outpatient Medications:     acetaminophen (TYLENOL) 325 MG tablet, Take 2 tablets by mouth Every 4 (Four) Hours As Needed for Mild Pain., Disp: 60 tablet, Rfl: 0    atorvastatin (LIPITOR) 80 MG tablet, Take 0.5 tablets by mouth every night at bedtime., Disp: 45 tablet, Rfl: 1    Calcium-Magnesium-Vitamin D 600-300-400 liquid, Take  by mouth Daily. 2 tbsp daily, Disp: , Rfl:     clopidogrel (PLAVIX) 75 MG tablet, Take 1 tablet by mouth Daily., Disp: 90 tablet, Rfl: 1    fluticasone (FLONASE) 50 MCG/ACT nasal spray, Administer 2 sprays into the nostril(s) as directed by provider Daily., Disp: , Rfl:     hydrocortisone 1 % cream, As Needed for Irritation or Rash. PRN, Disp: , Rfl:     Multiple Vitamins-Minerals (MULTI VITAMIN/MINERALS PO), Take 1 tablet by mouth Daily., Disp: , Rfl:     ramipril (ALTACE) 10 MG capsule, Take 1 capsule by mouth Daily., Disp: 90 capsule, Rfl: 1    ciprofloxacin (Ciloxan) 0.3 % ophthalmic solution, Administer 1 drop to the right eye 4 (Four) Times a Day for 7 days., Disp: 2.5 mL, Rfl: 0    ketoconazole  (NIZORAL) 2 % cream, Apply 1 Application topically to the appropriate area as directed Every Night., Disp: 30 g, Rfl: 0    Return in about 6 months (around 7/17/2025), or if symptoms worsen or fail to improve, for Recheck bp.

## 2025-01-18 LAB — 25(OH)D3 SERPL-MCNC: 50.9 NG/ML (ref 30–100)

## 2025-01-19 DIAGNOSIS — R94.4 DECREASED GFR: Primary | ICD-10-CM

## 2025-01-19 DIAGNOSIS — D72.821 MONOCYTOSIS: ICD-10-CM

## 2025-01-24 ENCOUNTER — TELEPHONE (OUTPATIENT)
Dept: INTERNAL MEDICINE | Facility: CLINIC | Age: 85
End: 2025-01-24
Payer: MEDICARE

## 2025-01-24 NOTE — TELEPHONE ENCOUNTER
"  Caller: Anabell Vera     Relationship: SELF    Best call back number:  [phone]    What is your medical concern? PATIENT WAS TREATED FOR A RIGHT EYE INFECTION ; PATIENT HAS BEEN ON ANTIBOTIC DROPS FOR 7 DAYS AND HAS 2  MORE TIMES TO TAKE TODAY; SHE IS ASKING IF SHE NEEDS TO CONTINUE TAKING THE DROPS AS THIS INFECTION HAS NOT CLEARED SHE IS STILL HAVING RED AROUND THE STYE OF THE RIGHT EYE AND ALSO IT IS HAVING PUS COMING OUT OF THE EYE; SHE ADVISED IT IS CLEARING UP THE INFECTION, BUT JUST NOT \"COMPLETELY GONE\"  ; PATIENT IS ASKING IF SHE CAN CONTINUE TAKING THESE DROPS? (SHE DOES HAVE DROPS LEFT)     How long has this issue been going on? ONE WEEK, SAW DR PABON [id number]    Is your provider already aware of this issue? YES    Have you been treated for this issue? YES    PLEASE CALL TO ADVISE          "

## 2025-01-30 ENCOUNTER — OFFICE VISIT (OUTPATIENT)
Dept: INTERNAL MEDICINE | Facility: CLINIC | Age: 85
End: 2025-01-30
Payer: MEDICARE

## 2025-01-30 VITALS
HEIGHT: 62 IN | RESPIRATION RATE: 18 BRPM | WEIGHT: 132 LBS | TEMPERATURE: 98.2 F | HEART RATE: 64 BPM | SYSTOLIC BLOOD PRESSURE: 118 MMHG | BODY MASS INDEX: 24.29 KG/M2 | DIASTOLIC BLOOD PRESSURE: 64 MMHG | OXYGEN SATURATION: 97 %

## 2025-01-30 DIAGNOSIS — H00.012 HORDEOLUM EXTERNUM OF RIGHT LOWER EYELID: Primary | ICD-10-CM

## 2025-01-30 PROCEDURE — 1125F AMNT PAIN NOTED PAIN PRSNT: CPT

## 2025-01-30 PROCEDURE — 3074F SYST BP LT 130 MM HG: CPT

## 2025-01-30 PROCEDURE — 1160F RVW MEDS BY RX/DR IN RCRD: CPT

## 2025-01-30 PROCEDURE — G2211 COMPLEX E/M VISIT ADD ON: HCPCS

## 2025-01-30 PROCEDURE — 1159F MED LIST DOCD IN RCRD: CPT

## 2025-01-30 PROCEDURE — 99213 OFFICE O/P EST LOW 20 MIN: CPT

## 2025-01-30 PROCEDURE — 3078F DIAST BP <80 MM HG: CPT

## 2025-01-30 RX ORDER — CIPROFLOXACIN HYDROCHLORIDE 3.5 MG/ML
SOLUTION/ DROPS TOPICAL
COMMUNITY
Start: 2025-01-17 | End: 2025-01-30

## 2025-01-30 RX ORDER — POLYMYXIN B SULFATE AND TRIMETHOPRIM 1; 10000 MG/ML; [USP'U]/ML
1 SOLUTION OPHTHALMIC EVERY 6 HOURS
Qty: 10 ML | Refills: 1 | Status: SHIPPED | OUTPATIENT
Start: 2025-01-30 | End: 2025-02-03 | Stop reason: SINTOL

## 2025-01-30 NOTE — PROGRESS NOTES
Office Note     Name: Anabell Vera    : 1940     MRN: [id number]     Chief Complaint  Stye (Follow up on stye/Has been there  /Used polymyxin-B/trunetgoprimophth solution 2 years ago and that seemed to get rid of her stye that time  )    Subjective     History of Present Illness:  History of Present Illness      Anabell Vera is a 84 y.o. female who presents today for 2 week f/u on stye to R eye    Pt prescribed cipro gtts for her eye and states the stye has improved although there is still a small pin point red spot to her lower lid. She denies any drainage or blurry vision.       Past Medical History:   Diagnosis Date    Acquired female bladder prolapse 2022    Eczema 2018    Endometrial cancer 2023    Female bladder prolapse 2022    Hematuria, microscopic     W/u by urology was (-)    High cholesterol     Started Rx after TIA    History of brachytherapy 2023    vaginal brachytherapy    History of radiation therapy 2023    proximal vagina for endometrial CA    Hypertension     Hyperthyroidism 1985    Resolved w/o Tx after ~ 6 months    PFO (patent foramen ovale) 10/2016    per echo    Renal oncocytoma of right kidney 2018    Right kidney mass 2016    found by Dr Cameron Hall    Single kidney 2018    TIA (transient ischemic attack) 10/2016     Past Surgical History:   Procedure Laterality Date    BASAL CELL CARCINOMA EXCISION      left temple    COLONOSCOPY  10/10/2019    Dr Mattson, 5 yr repeat family hx colon ca    COLONOSCOPY W/ POLYPECTOMY  10/14/2024    Dr Mattson, hyperplastic polyp    LAPAROSCOPIC CHOLECYSTECTOMY  2012    LAPAROSCOPIC NEPHRECTOMY Right 2018    Oncocytoma    TOTAL LAPAROSCOPIC HYSTERECTOMY SALPINGO OOPHORECTOMY N/A 2023    Procedure: TOTAL LAPAROSCOPIC HYSTERECTOMY BILATERAL SALPINGO-OOPHORECTOMY, INJECTION FOR SENTINEL LYMPH NODE MAPPING, BILATERAL SENTINEL LYMPH NODE DISSECTION WITH DAVINCI ROBOT;  Surgeon:  "Lindsay Kingston MD;  Location: [facility];  Service: Robotics - DaVinci;  Laterality: N/A;    WISDOM TOOTH EXTRACTION  1959       Current Outpatient Medications:     acetaminophen (TYLENOL) 325 MG tablet, Take 2 tablets by mouth Every 4 (Four) Hours As Needed for Mild Pain., Disp: 60 tablet, Rfl: 0    atorvastatin (LIPITOR) 80 MG tablet, Take 0.5 tablets by mouth every night at bedtime., Disp: 45 tablet, Rfl: 1    Calcium-Magnesium-Vitamin D 600-300-400 liquid, Take  by mouth Daily. 2 tbsp daily, Disp: , Rfl:     clopidogrel (PLAVIX) 75 MG tablet, Take 1 tablet by mouth Daily., Disp: 90 tablet, Rfl: 1    fluticasone (FLONASE) 50 MCG/ACT nasal spray, Administer 2 sprays into the nostril(s) as directed by provider Daily., Disp: , Rfl:     hydrocortisone 1 % cream, As Needed for Irritation or Rash. PRN, Disp: , Rfl:     ketoconazole (NIZORAL) 2 % cream, Apply 1 Application topically to the appropriate area as directed Every Night., Disp: 30 g, Rfl: 0    Multiple Vitamins-Minerals (MULTI VITAMIN/MINERALS PO), Take 1 tablet by mouth Daily., Disp: , Rfl:     ramipril (ALTACE) 10 MG capsule, Take 1 capsule by mouth Daily., Disp: 90 capsule, Rfl: 1    trimethoprim-polymyxin b (POLYTRIM) 07806-6.1 UNIT/ML-% ophthalmic solution, Administer 1 drop to the right eye Every 6 (Six) Hours for 7 days., Disp: 10 mL, Rfl: 1  Allergies   Allergen Reactions    Motrin [Ibuprofen] Other (See Comments)     ONLY HAS ONE KIDNEY, NO IBUPROFEN    Wheat Rash     Gluten    Amoxicillin Rash    Garlic Itching    Latex Rash    Penicillins Rash       Objective     Vital Signs  /64 (BP Location: Left arm, Patient Position: Sitting, Cuff Size: Adult)   Pulse 64   Temp 98.2 °F (36.8 °C) (Infrared)   Resp 18   Ht 157.5 cm (62.01\")   Wt 59.9 kg (132 lb)   SpO2 97%   BMI 24.14 kg/m²   Estimated body mass index is 24.14 kg/m² as calculated from the following:    Height as of this encounter: 157.5 cm (62.01\").    Weight as of this encounter: " 59.9 kg (132 lb).    BMI is within normal parameters. No other follow-up for BMI required.      Physical Exam    Physical Exam  Vitals and nursing note reviewed.   Constitutional:       Appearance: Normal appearance. She is well-developed. She is not ill-appearing.   HENT:      Head: Normocephalic.      Nose: Nose normal.   Eyes:      General: Lids are normal.         Right eye: Hordeolum present. No discharge.      Conjunctiva/sclera: Conjunctivae normal.      Right eye: Right conjunctiva is not injected. No exudate or hemorrhage.     Pupils: Pupils are equal, round, and reactive to light.        Comments: Healing sty right lower lid   Neck:      Thyroid: No thyroid mass or thyromegaly.      Trachea: Trachea normal.   Cardiovascular:      Rate and Rhythm: Normal rate.   Pulmonary:      Effort: Pulmonary effort is normal. No respiratory distress.      Breath sounds: No decreased breath sounds.   Neurological:      General: No focal deficit present.      Mental Status: She is alert and oriented to person, place, and time. Mental status is at baseline.   Psychiatric:         Mood and Affect: Mood normal.         Behavior: Behavior normal.         Thought Content: Thought content normal.         Judgment: Judgment normal.          Results             Assessment and Plan     Diagnoses and all orders for this visit:    1. Hordeolum externum of right lower eyelid (Primary)  -     trimethoprim-polymyxin b (POLYTRIM) 47802-5.1 UNIT/ML-% ophthalmic solution; Administer 1 drop to the right eye Every 6 (Six) Hours for 7 days.  Dispense: 10 mL; Refill: 1    Stye to her right lower eyelid has almost completely healed.  I encouraged her to use a warm compress to aid in healing.  She is concerned about a potential flareup so I have sent Polytrim to her pharmacy for her to use if she develops another stye.  She can stop the Cipro drops that were previously prescribed as symptoms have almost fully resolved.  Assessment &  Plan      Follow Up  Return if symptoms worsen or fail to improve.    Nataliya Chavez, APRN

## 2025-02-03 ENCOUNTER — OFFICE VISIT (OUTPATIENT)
Dept: INTERNAL MEDICINE | Facility: CLINIC | Age: 85
End: 2025-02-03
Payer: MEDICARE

## 2025-02-03 VITALS
SYSTOLIC BLOOD PRESSURE: 138 MMHG | DIASTOLIC BLOOD PRESSURE: 60 MMHG | HEIGHT: 62 IN | TEMPERATURE: 97.5 F | OXYGEN SATURATION: 97 % | HEART RATE: 63 BPM | BODY MASS INDEX: 24.07 KG/M2 | WEIGHT: 130.8 LBS

## 2025-02-03 DIAGNOSIS — H10.11 ALLERGIC CONJUNCTIVITIS OF RIGHT EYE: Primary | ICD-10-CM

## 2025-02-03 PROCEDURE — 3078F DIAST BP <80 MM HG: CPT | Performed by: FAMILY MEDICINE

## 2025-02-03 PROCEDURE — 1160F RVW MEDS BY RX/DR IN RCRD: CPT | Performed by: FAMILY MEDICINE

## 2025-02-03 PROCEDURE — 1125F AMNT PAIN NOTED PAIN PRSNT: CPT | Performed by: FAMILY MEDICINE

## 2025-02-03 PROCEDURE — 1159F MED LIST DOCD IN RCRD: CPT | Performed by: FAMILY MEDICINE

## 2025-02-03 PROCEDURE — 3075F SYST BP GE 130 - 139MM HG: CPT | Performed by: FAMILY MEDICINE

## 2025-02-03 PROCEDURE — 99213 OFFICE O/P EST LOW 20 MIN: CPT | Performed by: FAMILY MEDICINE

## 2025-02-03 NOTE — PROGRESS NOTES
"Subjective   Anabell Vera is a 84 y.o. female.     Chief Complaint   Patient presents with    Allergic Reaction     Allergic reaction to eye medication prescribed by Nataliya on 1/30.  Right eye redness and watery         Visit Vitals  /60 (BP Location: Left arm, Patient Position: Sitting, Cuff Size: Adult)   Pulse 63   Temp 97.5 °F (36.4 °C)   Ht 157.5 cm (62\")   Wt 59.3 kg (130 lb 12.8 oz)   LMP  (LMP Unknown)   SpO2 97%   BMI 23.92 kg/m²         Allergic Reaction  This is a new problem. The current episode started 2 days ago. The problem has been rapidly improving since onset. The problem is mild. The patient was exposed to an antibiotic. The time of exposure was just prior to onset. Associated symptoms include eye itching, eye redness and eye watering. Pertinent negatives include no abdominal pain, chest pain, chest pressure, coughing, diarrhea, difficulty breathing, drooling, globus sensation, hyperventilation, itching, rash, stridor, trouble swallowing, vomiting or wheezing. There is no swelling present. Past treatments include nothing. The treatment provided no relief. Her past medical history is significant for medication allergies.      Pt took polytrim eye drops and eye was red then next day.  Pt has some itching of her right eye.       The following portions of the patient's history were reviewed and updated as appropriate: allergies, current medications, past family history, past medical history, past social history, past surgical history, and problem list.    Past Medical History:   Diagnosis Date    Acquired female bladder prolapse 01/2022    Eczema 03/21/2018    Endometrial cancer 03/29/2023    Female bladder prolapse 01/2022    Hematuria, microscopic 2012    W/u by urology was (-)    High cholesterol 2016    Started Rx after TIA    History of brachytherapy 08/28/2023    vaginal brachytherapy    History of radiation therapy 08/2023    proximal vagina for endometrial CA    Hypertension 2016    " Hyperthyroidism 1985    Resolved w/o Tx after ~ 6 months    PFO (patent foramen ovale) 10/2016    per echo    Renal oncocytoma of right kidney 2018    Right kidney mass 2016    found by Dr Cameron Hall    Single kidney 2018    TIA (transient ischemic attack) 10/2016      Past Surgical History:   Procedure Laterality Date    BASAL CELL CARCINOMA EXCISION      left temple    COLONOSCOPY  10/10/2019    Dr Mattson, 5 yr repeat family hx colon ca    COLONOSCOPY W/ POLYPECTOMY  10/14/2024    Dr Mattson, hyperplastic polyp    LAPAROSCOPIC CHOLECYSTECTOMY  2012    LAPAROSCOPIC NEPHRECTOMY Right 2018    Oncocytoma    TOTAL LAPAROSCOPIC HYSTERECTOMY SALPINGO OOPHORECTOMY N/A 2023    Procedure: TOTAL LAPAROSCOPIC HYSTERECTOMY BILATERAL SALPINGO-OOPHORECTOMY, INJECTION FOR SENTINEL LYMPH NODE MAPPING, BILATERAL SENTINEL LYMPH NODE DISSECTION WITH DAVINCI ROBOT;  Surgeon: Lindsay Kingston MD;  Location: [facility];  Service: Robotics - DaVinci;  Laterality: N/A;    WISDOM TOOTH EXTRACTION        Family History   Problem Relation Age of Onset    Cancer Father     Colon cancer Father     Pneumonia Father     Arthritis Mother     Diabetes Mother     Heart failure Mother     Diabetes Brother     Heart attack Brother     Cancer Brother     Colon polyps Brother     Prostate cancer Brother     Cancer Brother         on lips    Diabetes Sister     Heart attack Sister     Multiple sclerosis Sister     Diabetes Son     Diabetes Maternal Grandmother     Cervical cancer Maternal Grandmother     Breast cancer Other     Ovarian cancer Neg Hx       Social History     Socioeconomic History    Marital status:    Tobacco Use    Smoking status: Former     Current packs/day: 0.00     Average packs/day: 0.3 packs/day for 2.0 years (0.5 ttl pk-yrs)     Types: Cigarettes     Start date:      Quit date:      Years since quittin.1    Smokeless tobacco: Never    Tobacco comments:     2 years only    Vaping Use    Vaping status: Never Used   Substance and Sexual Activity    Alcohol use: Yes     Comment: rarely drinks wine    Drug use: No    Sexual activity: Not Currently     Partners: Male      Allergies   Allergen Reactions    Motrin [Ibuprofen] Other (See Comments)     ONLY HAS ONE KIDNEY, NO IBUPROFEN    Wheat Rash     Gluten    Amoxicillin Rash    Garlic Itching    Latex Rash    Penicillins Rash    Polytrim [Polymyxin B-Trimethoprim] Other (See Comments)     Red eye       Review of Systems   HENT:  Negative for drooling and trouble swallowing.    Eyes:  Positive for redness and itching.   Respiratory:  Negative for cough, wheezing and stridor.    Cardiovascular:  Negative for chest pain.   Gastrointestinal:  Negative for abdominal pain, diarrhea and vomiting.   Skin:  Negative for itching and rash.       Objective   Physical Exam  Vitals and nursing note reviewed.   Constitutional:       Appearance: She is well-developed.   HENT:      Head: Normocephalic.      Right Ear: External ear normal.      Left Ear: External ear normal.      Nose: Nose normal.   Eyes:      General: Lids are normal.      Extraocular Movements: Extraocular movements intact.      Conjunctiva/sclera: Conjunctivae normal.      Right eye: Right conjunctiva is injected. No chemosis.     Left eye: Left conjunctiva is not injected. No chemosis.     Pupils: Pupils are equal, round, and reactive to light.   Neck:      Thyroid: No thyroid mass or thyromegaly.      Vascular: No carotid bruit.      Trachea: Trachea normal.   Cardiovascular:      Rate and Rhythm: Normal rate and regular rhythm.      Heart sounds: No murmur heard.  Pulmonary:      Effort: Pulmonary effort is normal. No respiratory distress.      Breath sounds: Normal breath sounds. No decreased breath sounds, wheezing, rhonchi or rales.   Chest:      Chest wall: No tenderness.   Abdominal:      General: Bowel sounds are normal.      Palpations: Abdomen is soft.      Tenderness:  There is no abdominal tenderness.   Musculoskeletal:         General: Normal range of motion.      Cervical back: Normal range of motion and neck supple.   Skin:     General: Skin is warm and dry.   Neurological:      Mental Status: She is alert and oriented to person, place, and time.   Psychiatric:         Behavior: Behavior normal.         Assessment & Plan   Problems Addressed this Visit    None  Visit Diagnoses       Allergic conjunctivitis of right eye    -  Primary          Diagnoses         Codes Comments    Allergic conjunctivitis of right eye    -  Primary ICD-10-CM: H10.11  ICD-9-CM: 372.14           Artificial tears.  Add Claritin if the artificial tears on helping               Current Outpatient Medications:     acetaminophen (TYLENOL) 325 MG tablet, Take 2 tablets by mouth Every 4 (Four) Hours As Needed for Mild Pain., Disp: 60 tablet, Rfl: 0    atorvastatin (LIPITOR) 80 MG tablet, Take 0.5 tablets by mouth every night at bedtime., Disp: 45 tablet, Rfl: 1    Calcium-Magnesium-Vitamin D 600-300-400 liquid, Take  by mouth Daily. 2 tbsp daily, Disp: , Rfl:     clopidogrel (PLAVIX) 75 MG tablet, Take 1 tablet by mouth Daily., Disp: 90 tablet, Rfl: 1    fluticasone (FLONASE) 50 MCG/ACT nasal spray, Administer 2 sprays into the nostril(s) as directed by provider Daily., Disp: , Rfl:     hydrocortisone 1 % cream, As Needed for Irritation or Rash. PRN, Disp: , Rfl:     ketoconazole (NIZORAL) 2 % cream, Apply 1 Application topically to the appropriate area as directed Every Night., Disp: 30 g, Rfl: 0    Multiple Vitamins-Minerals (MULTI VITAMIN/MINERALS PO), Take 1 tablet by mouth Daily., Disp: , Rfl:     ramipril (ALTACE) 10 MG capsule, Take 1 capsule by mouth Daily., Disp: 90 capsule, Rfl: 1    Return if symptoms worsen or fail to improve, for Recheck.

## 2025-02-04 ENCOUNTER — TELEPHONE (OUTPATIENT)
Dept: INTERNAL MEDICINE | Facility: CLINIC | Age: 85
End: 2025-02-04

## 2025-02-04 ENCOUNTER — OFFICE VISIT (OUTPATIENT)
Dept: GYNECOLOGIC ONCOLOGY | Facility: CLINIC | Age: 85
End: 2025-02-04
Payer: MEDICARE

## 2025-02-04 ENCOUNTER — LAB (OUTPATIENT)
Dept: INTERNAL MEDICINE | Facility: CLINIC | Age: 85
End: 2025-02-04
Payer: MEDICARE

## 2025-02-04 VITALS
TEMPERATURE: 97.3 F | HEIGHT: 62 IN | HEART RATE: 62 BPM | OXYGEN SATURATION: 99 % | RESPIRATION RATE: 17 BRPM | WEIGHT: 130.1 LBS | DIASTOLIC BLOOD PRESSURE: 65 MMHG | BODY MASS INDEX: 23.94 KG/M2 | SYSTOLIC BLOOD PRESSURE: 155 MMHG

## 2025-02-04 DIAGNOSIS — R94.4 DECREASED GFR: ICD-10-CM

## 2025-02-04 DIAGNOSIS — D72.821 MONOCYTOSIS: ICD-10-CM

## 2025-02-04 DIAGNOSIS — C54.1 ENDOMETRIAL CANCER: Primary | ICD-10-CM

## 2025-02-04 LAB
BASOPHILS # BLD AUTO: 0.02 10*3/MM3 (ref 0–0.2)
BASOPHILS NFR BLD AUTO: 0.5 % (ref 0–1.5)
DEPRECATED RDW RBC AUTO: 39.4 FL (ref 37–54)
EOSINOPHIL # BLD AUTO: 0.14 10*3/MM3 (ref 0–0.4)
EOSINOPHIL NFR BLD AUTO: 3.8 % (ref 0.3–6.2)
ERYTHROCYTE [DISTWIDTH] IN BLOOD BY AUTOMATED COUNT: 12.2 % (ref 12.3–15.4)
HCT VFR BLD AUTO: 38.7 % (ref 34–46.6)
HGB BLD-MCNC: 13.4 G/DL (ref 12–15.9)
IMM GRANULOCYTES # BLD AUTO: 0 10*3/MM3 (ref 0–0.05)
IMM GRANULOCYTES NFR BLD AUTO: 0 % (ref 0–0.5)
LYMPHOCYTES # BLD AUTO: 1.07 10*3/MM3 (ref 0.7–3.1)
LYMPHOCYTES NFR BLD AUTO: 28.7 % (ref 19.6–45.3)
MCH RBC QN AUTO: 31.2 PG (ref 26.6–33)
MCHC RBC AUTO-ENTMCNC: 34.6 G/DL (ref 31.5–35.7)
MCV RBC AUTO: 90 FL (ref 79–97)
MONOCYTES # BLD AUTO: 0.64 10*3/MM3 (ref 0.1–0.9)
MONOCYTES NFR BLD AUTO: 17.2 % (ref 5–12)
NEUTROPHILS NFR BLD AUTO: 1.86 10*3/MM3 (ref 1.7–7)
NEUTROPHILS NFR BLD AUTO: 49.8 % (ref 42.7–76)
PLATELET # BLD AUTO: 137 10*3/MM3 (ref 140–450)
PMV BLD AUTO: 11.4 FL (ref 6–12)
RBC # BLD AUTO: 4.3 10*6/MM3 (ref 3.77–5.28)
WBC NRBC COR # BLD AUTO: 3.73 10*3/MM3 (ref 3.4–10.8)

## 2025-02-04 PROCEDURE — 1126F AMNT PAIN NOTED NONE PRSNT: CPT | Performed by: OBSTETRICS & GYNECOLOGY

## 2025-02-04 PROCEDURE — 36415 COLL VENOUS BLD VENIPUNCTURE: CPT | Performed by: FAMILY MEDICINE

## 2025-02-04 PROCEDURE — 99213 OFFICE O/P EST LOW 20 MIN: CPT | Performed by: OBSTETRICS & GYNECOLOGY

## 2025-02-04 PROCEDURE — 3078F DIAST BP <80 MM HG: CPT | Performed by: OBSTETRICS & GYNECOLOGY

## 2025-02-04 PROCEDURE — 85025 COMPLETE CBC W/AUTO DIFF WBC: CPT | Performed by: FAMILY MEDICINE

## 2025-02-04 PROCEDURE — 3077F SYST BP >= 140 MM HG: CPT | Performed by: OBSTETRICS & GYNECOLOGY

## 2025-02-04 PROCEDURE — 1160F RVW MEDS BY RX/DR IN RCRD: CPT | Performed by: OBSTETRICS & GYNECOLOGY

## 2025-02-04 PROCEDURE — 1159F MED LIST DOCD IN RCRD: CPT | Performed by: OBSTETRICS & GYNECOLOGY

## 2025-02-04 PROCEDURE — 80048 BASIC METABOLIC PNL TOTAL CA: CPT | Performed by: FAMILY MEDICINE

## 2025-02-04 NOTE — TELEPHONE ENCOUNTER
Patient stopped by the office and stated that she started taking the artifical eye drops and this morning her eye lid was swollen. She also stated that she read on the Claritin package to consult with your doctor before taking due to kidney issues, and she does have kidney issues. She would like a call to discuss these issues.

## 2025-02-04 NOTE — TELEPHONE ENCOUNTER
Patient did the artificial tears yesterday and her upper eyelid was a little swollen.  She thinks she may have used to much.  She took it because was itching.  She used it 3 times yesterday.  Can she use a cold or warm pad on her eye lid.   Patient read on the Claritin box that Dr. Toscano recommended not to take if you have kidney disease.  She does have kidney disease.  Should she take this?

## 2025-02-04 NOTE — PROGRESS NOTES
Anabell Vera  [id number]  1940      Reason for visit:  endometrial cancer, surveillance    History of present illness:  The patient is a 84 y.o. year old female who presents today for treatment and evaluation of the above issues.     Patient reports she is doing well today.  She saw Dr. Khan for possible pessary and he did not think it was a good idea given ongoing vaginal erosion related to a combination of significant pelvic organ prolapse and radiation changes.  I performed a biopsy of the vagina last year which was negative for malignancy.  She reports small amount of discharge, but no bleeding today.  She otherwise notes good energy, bowel and bladder function at baseline.    Oncologic History:  Oncology/Hematology History   Endometrial cancer   3/29/2023 Initial Diagnosis    Endometrial cancer  Referred by Dr. 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 MMR intact  TVUS with uterus measuring 4.9x3.1x4.6 cm with EMS 13.0 mm. Normal appearing ovaries.      5/19/2023 Surgery    Robotic-assisted total laparoscopic hysterectomy with bilateral salpingo-oophorectomy, injection for sentinel lymph node dissection, and pelvic sentinel lymph node dissection    Pathology demonstrates a grade 2 endometrial cancer with 90% MI. Absent LVSI. Negative cervix and adnexa. Negative sentinel pelvic lymph nodes.    Surgery at [facility] by Lindsay Kingston MD          Cancer Staged    Cancer Staging   Endometrial cancer  Staging form: Corpus Uteri - Carcinoma And Carcinosarcoma, AJCC 8th Edition  - Clinical stage from 5/19/2023: FIGO Stage IB (cT1b, cN0(sn), cM0) - Signed by Lindsay Kingston MD on 10/31/2023       5/19/2023 Cancer Staged    Staging form: Corpus Uteri - Carcinoma And Carcinosarcoma, AJCC 8th Edition  - Clinical stage from 5/19/2023: FIGO Stage IB (cT1b, cN0(sn), cM0) - Signed by Lindsay Kingston MD on 10/31/2023     8/15/2023 - 8/28/2023  Radiation    Radiation OncologyTreatment Course:  Anabell Vera received 3000 cGy in 5 fractions to upper vagina via High Dose Radiation - HDR.           Past Medical History:   Diagnosis Date    Acquired female bladder prolapse 01/2022    Eczema 03/21/2018    Endometrial cancer 03/29/2023    Female bladder prolapse 01/2022    Hematuria, microscopic 2012    W/u by urology was (-)    High cholesterol 2016    Started Rx after TIA    History of brachytherapy 08/28/2023    vaginal brachytherapy    History of radiation therapy 08/2023    proximal vagina for endometrial CA    Hypertension 2016    Hyperthyroidism 1985    Resolved w/o Tx after ~ 6 months    PFO (patent foramen ovale) 10/2016    per echo    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    Right kidney mass 2016    found by Dr Cameron Hall    Single kidney 01/26/2018    TIA (transient ischemic attack) 10/2016       Past Surgical History:   Procedure Laterality Date    BASAL CELL CARCINOMA EXCISION  2006    left temple    COLONOSCOPY  10/10/2019    Dr Mattson, 5 yr repeat family hx colon ca    COLONOSCOPY W/ POLYPECTOMY  10/14/2024    Dr Mattson, hyperplastic polyp    LAPAROSCOPIC CHOLECYSTECTOMY  12/04/2012    LAPAROSCOPIC NEPHRECTOMY Right 01/26/2018    Oncocytoma    TOTAL LAPAROSCOPIC HYSTERECTOMY SALPINGO OOPHORECTOMY N/A 05/09/2023    Procedure: TOTAL LAPAROSCOPIC HYSTERECTOMY BILATERAL SALPINGO-OOPHORECTOMY, INJECTION FOR SENTINEL LYMPH NODE MAPPING, BILATERAL SENTINEL LYMPH NODE DISSECTION WITH DAVINCI ROBOT;  Surgeon: Lindsay Kingston MD;  Location: [facility];  Service: Robotics - DaVinci;  Laterality: N/A;    WISDOM TOOTH EXTRACTION  1959       MEDICATIONS:    Current Outpatient Medications:     acetaminophen (TYLENOL) 325 MG tablet, Take 2 tablets by mouth Every 4 (Four) Hours As Needed for Mild Pain., Disp: 60 tablet, Rfl: 0    atorvastatin (LIPITOR) 80 MG tablet, Take 0.5 tablets by mouth every night at bedtime., Disp: 45 tablet, Rfl: 1     Calcium-Magnesium-Vitamin D 600-300-400 liquid, Take  by mouth Daily. 2 tbsp daily, Disp: , Rfl:     clopidogrel (PLAVIX) 75 MG tablet, Take 1 tablet by mouth Daily., Disp: 90 tablet, Rfl: 1    fluticasone (FLONASE) 50 MCG/ACT nasal spray, Administer 2 sprays into the nostril(s) as directed by provider Daily., Disp: , Rfl:     hydrocortisone 1 % cream, As Needed for Irritation or Rash. PRN, Disp: , Rfl:     ketoconazole (NIZORAL) 2 % cream, Apply 1 Application topically to the appropriate area as directed Every Night., Disp: 30 g, Rfl: 0    Multiple Vitamins-Minerals (MULTI VITAMIN/MINERALS PO), Take 1 tablet by mouth Daily., Disp: , Rfl:     ramipril (ALTACE) 10 MG capsule, Take 1 capsule by mouth Daily., Disp: 90 capsule, Rfl: 1     Allergies:  is allergic to motrin [ibuprofen], wheat, amoxicillin, garlic, latex, penicillins, and polytrim [polymyxin b-trimethoprim].    Social History:   Social History     Socioeconomic History    Marital status:    Tobacco Use    Smoking status: Former     Current packs/day: 0.00     Average packs/day: 0.3 packs/day for 2.0 years (0.5 ttl pk-yrs)     Types: Cigarettes     Start date:      Quit date:      Years since quittin.1    Smokeless tobacco: Never    Tobacco comments:     2 years only   Vaping Use    Vaping status: Never Used   Substance and Sexual Activity    Alcohol use: Yes     Comment: rarely drinks wine    Drug use: No    Sexual activity: Not Currently     Partners: Male       Family History:    Family History   Problem Relation Age of Onset    Cancer Father     Colon cancer Father     Pneumonia Father     Arthritis Mother     Diabetes Mother     Heart failure Mother     Diabetes Brother     Heart attack Brother     Cancer Brother     Colon polyps Brother     Prostate cancer Brother     Cancer Brother         on lips    Diabetes Sister     Heart attack Sister     Multiple sclerosis Sister     Diabetes Son     Diabetes Maternal Grandmother      "Cervical cancer Maternal Grandmother     Breast cancer Other     Ovarian cancer Neg Hx        Health Maintenance:    Health Maintenance   Topic Date Due    ZOSTER VACCINE (1 of 2) Never done    Pneumococcal Vaccine 65+ (2 of 2 - PPSV23) 12/30/2016    COVID-19 Vaccine (8 - 2024-25 season) 12/18/2024    ANNUAL WELLNESS VISIT  09/30/2025    LIPID PANEL  01/17/2026    DXA SCAN  01/23/2026    COLORECTAL CANCER SCREENING  10/14/2029    TDAP/TD VACCINES (3 - Td or Tdap) 01/17/2035    RSV Vaccine - Adults  Completed    INFLUENZA VACCINE  Completed    MAMMOGRAM  Discontinued       Review of Systems:  Please refer to history of present illness.  Review of systems otherwise negative.    Vitals:    02/04/25 1404   BP: 155/65   Pulse: 62   Resp: 17   Temp: 97.3 °F (36.3 °C)   TempSrc: Temporal   SpO2: 99%   Weight: 59 kg (130 lb 1.6 oz)   Height: 157.5 cm (62.01\")   PainSc: 0-No pain     Body mass index is 23.79 kg/m².  Wt Readings from Last 3 Encounters:   02/04/25 59 kg (130 lb 1.6 oz)   02/03/25 59.3 kg (130 lb 12.8 oz)   01/30/25 59.9 kg (132 lb)     Physical Exam:  GENERAL: Alert, well-appearing female appearing her stated age who is in no apparent distress.   HEENT: Sclera anicteric. Head normocephalic, atraumatic. Mucus membranes moist.   NECK: Trachea midline, supple, without masses.  No thyromegaly.   BREASTS: Deferred  CARDIOVASCULAR: Normal rate, regular rhythm, no murmurs, rubs, or gallops. No peripheral edema.  RESPIRATORY: Clear to auscultation bilaterally, normal respiratory effort  BACK:  No CVA tenderness, no vertebral tenderness on palpation  GASTROINTESTINAL:  Abdomen is soft, non-tender, non-distended, no rebound or guarding, no masses, or hernias.   SKIN:  Warm, dry, well-perfused.  All visible areas intact.  No rashes, lesions, ulcers.  PSYCHIATRIC: AO x3, with appropriate affect, normal thought processes.  NEUROLOGIC: No focal deficits. Moves extremities well.  MUSCULOSKELETAL: Normal gait and station. " "*Reproducible pain with palpation at the left paraspinous muscle  EXTREMITIES:   No cyanosis, clubbing, symmetric.  LYMPHATICS:  No cervical or inguinal adenopathy noted.     PELVIC exam:  External genitalia are free from concerning lesion.  On speculum examination, the vaginal cuff was intact and remarkable for erosive changes with a small amount of associated discharge.  There was significant pelvic organ prolapse.  On bimanual examination, no fullness was appreciated.  Uterus, cervix and adnexa were absent.  There was no significant tenderness.  Rectovaginal exam was deferred.    ECOG PS 0    PROCEDURES: none    Diagnostic Data:    5/8/24  Final Diagnosis   VAGINAL BIOPSY:   Acute erosive vaginitis with stromal edema  No viral changes or fungal organisms identified on routine stains  Negative for dysplasia or malignancy         Lab Results   Component Value Date    WBC 5.16 01/17/2025    HGB 13.9 01/17/2025    HCT 40.3 01/17/2025    MCV 90.4 01/17/2025     01/17/2025    NEUTROABS 2.68 01/17/2025    GLUCOSE 93 01/17/2025    BUN 29 (H) 01/17/2025    CREATININE 1.38 (H) 01/17/2025    EGFRIFNONA 49 (L) 03/11/2021     01/17/2025    K 4.6 01/17/2025    CL 99 01/17/2025    CO2 27.2 01/17/2025    PHOS 3.0 01/04/2023    CALCIUM 9.8 01/17/2025    ALBUMIN 4.3 01/17/2025    AST 26 01/17/2025    ALT 16 01/17/2025    BILITOT 0.5 01/17/2025     Lab Results   Component Value Date    TSH 3.710 01/10/2024    TSH 3.320 03/30/2022    TSH 3.540 08/21/2020    TSH 3.700 08/15/2019     No results found for: \"FT4\"  No results found for: \"\"      Assessment & Plan   This is a 84 y.o. woman with endometrial cancer who presents for surveillance     Encounter Diagnosis   Name Primary?    Endometrial cancer Yes     Stage 1B endometrial cancer; JENNY  -Cancer history as above  -Completed treatment in 08/2023  -Vaginal bleeding resolved.  -Saw Dr. Khan for consideration of pessary.  He didn't think this was a good idea due to " mucosal ulceration.    Pain assessment was performed today as a part of patient’s care.  For patients with pain related to surgery, gynecologic malignancy or cancer treatment, the plan is as noted in the assessment/plan.  For patients with pain not related to these issues, they are to seek any further needed care from a more appropriate provider, such as PCP.      No orders of the defined types were placed in this encounter.    FOLLOW UP: 3 months     I spent 23 minutes caring for Anabell on this date of service. This time includes time spent by me in the following activities: preparing for the visit, reviewing tests, performing a medically appropriate examination and/or evaluation, counseling and educating the patient/family/caregiver, referring and communicating with other health care professionals, documenting information in the medical record, and care coordination    Suzette Marr MD  02/04/25  20:04 EDT

## 2025-02-05 DIAGNOSIS — E87.1 SERUM SODIUM DECREASED: ICD-10-CM

## 2025-02-05 DIAGNOSIS — D72.821 MONOCYTOSIS: Primary | ICD-10-CM

## 2025-02-05 LAB
ANION GAP SERPL CALCULATED.3IONS-SCNC: 11.2 MMOL/L (ref 5–15)
BUN SERPL-MCNC: 19 MG/DL (ref 8–23)
BUN/CREAT SERPL: 17.4 (ref 7–25)
CALCIUM SPEC-SCNC: 9.3 MG/DL (ref 8.6–10.5)
CHLORIDE SERPL-SCNC: 93 MMOL/L (ref 98–107)
CO2 SERPL-SCNC: 24.8 MMOL/L (ref 22–29)
CREAT SERPL-MCNC: 1.09 MG/DL (ref 0.57–1)
EGFRCR SERPLBLD CKD-EPI 2021: 50.5 ML/MIN/1.73
GLUCOSE SERPL-MCNC: 172 MG/DL (ref 65–99)
POTASSIUM SERPL-SCNC: 4.4 MMOL/L (ref 3.5–5.2)
SODIUM SERPL-SCNC: 129 MMOL/L (ref 136–145)

## 2025-02-05 NOTE — TELEPHONE ENCOUNTER
Patient states the eye looks better today.  Eye is not itchy today and she has not used the eye drops.  Please advise on Claritin. I let her know to use cool compress.

## 2025-02-05 NOTE — TELEPHONE ENCOUNTER
Today's GFR was 50 which is improved since the previous GFR.  If itching is severe she can take the Claritin otherwise I would avoid it.

## 2025-02-06 ENCOUNTER — TELEPHONE (OUTPATIENT)
Dept: INTERNAL MEDICINE | Facility: CLINIC | Age: 85
End: 2025-02-06
Payer: MEDICARE

## 2025-02-06 NOTE — TELEPHONE ENCOUNTER
Name: Anabell Vera    Relationship: Self    Best Callback Number: [phone]     HUB PROVIDED THE RELAY MESSAGE FROM THE OFFICE   PATIENT VOICED UNDERSTANDING AND HAS NO FURTHER QUESTIONS AT THIS TIME    ADDITIONAL INFORMATION: PATIENT STATED THAT SHE WAS FIGHTING AN EYE INFECTION, AT THE TIME AND SHE ONLY HAS ONE KIDNEY. SHE ALSO WAS DRINKING A LOT OF WATER AND SHE WAS NOT FASTING AT THE TIME.

## 2025-02-06 NOTE — TELEPHONE ENCOUNTER
HUB can relay:  Please call the patient regarding her abnormal result. Your platelets are slightly  low.  Your monocyte white cell count is elevated.  Have you been ill recently?  Your red cell count was okay.    Your sodium is low.  Are you having any cramps?  Are you getting enough salt in your diet?  Are you over drinking water?  Please repeat your basic metabolic panel in about 2 weeks and CBC.  Orders are in the computer  Your glomerular filtration rate is decreased which indicates a decrease in kidney function.  Please avoid high-dose nonsteroidal anti-inflammatories such as Advil or Aleve which can further decrease the kidney function.  Please avoid dehydration.  Your GFR has improved since last lab.    Your glucose is elevated if you were fasting. Goal fasting glucose is less than 100.   Please follow a low animal fat diet that is also low in sugar, low in junk food, low in sweet drinks and low in alcohol.  Please increase the amount of fiber in your diet as well as increasing your daily exercise, such as walking.

## 2025-02-06 NOTE — TELEPHONE ENCOUNTER
----- Message from Alayna Toscano sent at 2/5/2025  2:16 PM EST -----  Please call the patient regarding her abnormal result. Your platelets are slightly  low.  Your monocyte white cell count is elevated.  Have you been ill recently?  Your red cell count was okay.    Your sodium is low.  Are you having any cramps?  Are you getting enough salt in your diet?  Are you over drinking water?  Please repeat your basic metabolic panel in about 2 weeks and CBC.  Orders are in the computer  Your glomerular filtration rate is decreased which indicates a decrease in kidney function.  Please avoid high-dose nonsteroidal anti-inflammatories such as Advil or Aleve which can further decrease the kidney function.  Please avoid dehydration.  Your GFR has improved since last lab.    Your glucose is elevated if you were fasting. Goal fasting glucose is less than 100.   Please follow a low animal fat diet that is also low in sugar, low in junk food, low in sweet drinks and low in alcohol.  Please increase the amount of fiber in your diet as well as increasing your daily exercise, such as walking.

## 2025-02-27 ENCOUNTER — TRANSCRIBE ORDERS (OUTPATIENT)
Dept: ADMINISTRATIVE | Facility: HOSPITAL | Age: 85
End: 2025-02-27
Payer: MEDICARE

## 2025-02-27 DIAGNOSIS — Z12.31 VISIT FOR SCREENING MAMMOGRAM: Primary | ICD-10-CM

## 2025-02-28 ENCOUNTER — LAB (OUTPATIENT)
Dept: INTERNAL MEDICINE | Facility: CLINIC | Age: 85
End: 2025-02-28
Payer: MEDICARE

## 2025-02-28 DIAGNOSIS — D72.821 MONOCYTOSIS: ICD-10-CM

## 2025-02-28 DIAGNOSIS — E87.1 SERUM SODIUM DECREASED: ICD-10-CM

## 2025-02-28 LAB
ANION GAP SERPL CALCULATED.3IONS-SCNC: 10.3 MMOL/L (ref 5–15)
BASOPHILS # BLD AUTO: 0.02 10*3/MM3 (ref 0–0.2)
BASOPHILS NFR BLD AUTO: 0.5 % (ref 0–1.5)
BUN SERPL-MCNC: 20 MG/DL (ref 8–23)
BUN/CREAT SERPL: 19.2 (ref 7–25)
CALCIUM SPEC-SCNC: 9.5 MG/DL (ref 8.6–10.5)
CHLORIDE SERPL-SCNC: 101 MMOL/L (ref 98–107)
CO2 SERPL-SCNC: 26.7 MMOL/L (ref 22–29)
CREAT SERPL-MCNC: 1.04 MG/DL (ref 0.57–1)
DEPRECATED RDW RBC AUTO: 40 FL (ref 37–54)
EGFRCR SERPLBLD CKD-EPI 2021: 53.1 ML/MIN/1.73
EOSINOPHIL # BLD AUTO: 0.04 10*3/MM3 (ref 0–0.4)
EOSINOPHIL NFR BLD AUTO: 1 % (ref 0.3–6.2)
ERYTHROCYTE [DISTWIDTH] IN BLOOD BY AUTOMATED COUNT: 12.2 % (ref 12.3–15.4)
GLUCOSE SERPL-MCNC: 86 MG/DL (ref 65–99)
HCT VFR BLD AUTO: 38.9 % (ref 34–46.6)
HGB BLD-MCNC: 13.2 G/DL (ref 12–15.9)
IMM GRANULOCYTES # BLD AUTO: 0.01 10*3/MM3 (ref 0–0.05)
IMM GRANULOCYTES NFR BLD AUTO: 0.3 % (ref 0–0.5)
LYMPHOCYTES # BLD AUTO: 1.39 10*3/MM3 (ref 0.7–3.1)
LYMPHOCYTES NFR BLD AUTO: 35.8 % (ref 19.6–45.3)
MCH RBC QN AUTO: 30.8 PG (ref 26.6–33)
MCHC RBC AUTO-ENTMCNC: 33.9 G/DL (ref 31.5–35.7)
MCV RBC AUTO: 90.9 FL (ref 79–97)
MONOCYTES # BLD AUTO: 0.7 10*3/MM3 (ref 0.1–0.9)
MONOCYTES NFR BLD AUTO: 18 % (ref 5–12)
NEUTROPHILS NFR BLD AUTO: 1.72 10*3/MM3 (ref 1.7–7)
NEUTROPHILS NFR BLD AUTO: 44.4 % (ref 42.7–76)
NRBC BLD AUTO-RTO: 0 /100 WBC (ref 0–0.2)
PLATELET # BLD AUTO: 154 10*3/MM3 (ref 140–450)
PMV BLD AUTO: 11.8 FL (ref 6–12)
POTASSIUM SERPL-SCNC: 4.6 MMOL/L (ref 3.5–5.2)
RBC # BLD AUTO: 4.28 10*6/MM3 (ref 3.77–5.28)
SODIUM SERPL-SCNC: 138 MMOL/L (ref 136–145)
WBC NRBC COR # BLD AUTO: 3.88 10*3/MM3 (ref 3.4–10.8)

## 2025-02-28 PROCEDURE — 80048 BASIC METABOLIC PNL TOTAL CA: CPT | Performed by: FAMILY MEDICINE

## 2025-02-28 PROCEDURE — 85025 COMPLETE CBC W/AUTO DIFF WBC: CPT | Performed by: FAMILY MEDICINE

## 2025-03-02 DIAGNOSIS — D72.821 MONOCYTOSIS: Primary | ICD-10-CM

## 2025-03-07 ENCOUNTER — HOSPITAL ENCOUNTER (OUTPATIENT)
Dept: MAMMOGRAPHY | Facility: HOSPITAL | Age: 85
Discharge: HOME | End: 2025-03-07
Admitting: FAMILY MEDICINE
Payer: MEDICARE

## 2025-03-07 DIAGNOSIS — Z12.31 VISIT FOR SCREENING MAMMOGRAM: ICD-10-CM

## 2025-03-07 PROCEDURE — 77063 BREAST TOMOSYNTHESIS BI: CPT

## 2025-03-07 PROCEDURE — 77067 SCR MAMMO BI INCL CAD: CPT

## 2025-03-27 ENCOUNTER — TELEPHONE (OUTPATIENT)
Dept: GYNECOLOGIC ONCOLOGY | Facility: CLINIC | Age: 85
End: 2025-03-27
Payer: MEDICARE

## 2025-03-27 NOTE — TELEPHONE ENCOUNTER
Light bleeding two weeks ago, and last week. Only one day a week. No bleeding this week.     Pt would like to come in to be seen.

## 2025-04-14 DIAGNOSIS — I10 PRIMARY HYPERTENSION: ICD-10-CM

## 2025-04-14 PROBLEM — Z85.42 HISTORY OF ENDOMETRIAL CANCER: Status: ACTIVE | Noted: 2025-04-14

## 2025-04-14 NOTE — TELEPHONE ENCOUNTER
Caller: Anabell Vera    Relationship: Self    Best call back number: [phone]     Requested Prescriptions:   Requested Prescriptions     Pending Prescriptions Disp Refills    ramipril (ALTACE) 10 MG capsule 90 capsule 1     Sig: Take 1 capsule by mouth Daily.        Pharm[facility] where request should be sent: [id number] [place], [place] - [address]  [facility] [address] [phone] [facility] [phone] FX     Last office visit with prescribing clinician: 2/3/2025   Last telemedicine visit with prescribing clinician: Visit date not found   Next office visit with prescribing clinician: Visit date not found     Does the patient have less than a 3 day supply:  [] Yes  [x] No    Would you like a call back once the refill request has been completed: [] Yes [] No    If the office needs to give you a call back, can they leave a voicemail: [] Yes [] No    Demetris Hurley Rep   04/14/25 12:34 EDT

## 2025-04-15 ENCOUNTER — OFFICE VISIT (OUTPATIENT)
Dept: GYNECOLOGIC ONCOLOGY | Facility: CLINIC | Age: 85
End: 2025-04-15
Payer: MEDICARE

## 2025-04-15 VITALS
HEART RATE: 68 BPM | BODY MASS INDEX: 24.99 KG/M2 | TEMPERATURE: 97.5 F | RESPIRATION RATE: 17 BRPM | SYSTOLIC BLOOD PRESSURE: 165 MMHG | DIASTOLIC BLOOD PRESSURE: 74 MMHG | HEIGHT: 62 IN | OXYGEN SATURATION: 100 % | WEIGHT: 135.8 LBS

## 2025-04-15 DIAGNOSIS — Z85.42 HISTORY OF ENDOMETRIAL CANCER: Primary | ICD-10-CM

## 2025-04-15 PROCEDURE — 88305 TISSUE EXAM BY PATHOLOGIST: CPT | Performed by: OBSTETRICS & GYNECOLOGY

## 2025-04-15 PROCEDURE — 1160F RVW MEDS BY RX/DR IN RCRD: CPT | Performed by: OBSTETRICS & GYNECOLOGY

## 2025-04-15 PROCEDURE — 99213 OFFICE O/P EST LOW 20 MIN: CPT | Performed by: OBSTETRICS & GYNECOLOGY

## 2025-04-15 PROCEDURE — 1126F AMNT PAIN NOTED NONE PRSNT: CPT | Performed by: OBSTETRICS & GYNECOLOGY

## 2025-04-15 PROCEDURE — 3077F SYST BP >= 140 MM HG: CPT | Performed by: OBSTETRICS & GYNECOLOGY

## 2025-04-15 PROCEDURE — 3078F DIAST BP <80 MM HG: CPT | Performed by: OBSTETRICS & GYNECOLOGY

## 2025-04-15 PROCEDURE — 1159F MED LIST DOCD IN RCRD: CPT | Performed by: OBSTETRICS & GYNECOLOGY

## 2025-04-15 PROCEDURE — 57100 BIOPSY VAGINAL MUCOSA SIMPLE: CPT | Performed by: OBSTETRICS & GYNECOLOGY

## 2025-04-15 NOTE — PROGRESS NOTES
Anabell Vera  [id number]  1940      Reason for visit:  endometrial cancer, surveillance    History of present illness:  The patient is a 85 y.o. year old female who presents today for treatment and evaluation of the above issues.     Patient underwent biopsy 5/2024 for vaginal bleeding benign pathology.  Had chronic ulceration related to pelvic organ prolapse.  Today, patient notes prolonged period without vaginal bleeding and then had vaginal bleeding in March after doing yard work.  This seems to have resolved at this point.  No other complaints or concerns except she is worried about her blood levels because she has to see a medical oncologist.  She wants me to check her lymph nodes.    Oncologic History:  Oncology/Hematology History   Endometrial cancer   3/29/2023 Initial Diagnosis    Endometrial cancer  Referred by Dr. 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 MMR intact  TVUS with uterus measuring 4.9x3.1x4.6 cm with EMS 13.0 mm. Normal appearing ovaries.      5/19/2023 Surgery    Robotic-assisted total laparoscopic hysterectomy with bilateral salpingo-oophorectomy, injection for sentinel lymph node dissection, and pelvic sentinel lymph node dissection    Pathology demonstrates a grade 2 endometrial cancer with 90% MI. Absent LVSI. Negative cervix and adnexa. Negative sentinel pelvic lymph nodes.    Surgery at [facility] by Lindsay Kingston MD          Cancer Staged    Cancer Staging   Endometrial cancer  Staging form: Corpus Uteri - Carcinoma And Carcinosarcoma, AJCC 8th Edition  - Clinical stage from 5/19/2023: FIGO Stage IB (cT1b, cN0(sn), cM0) - Signed by Lindsay Kingston MD on 10/31/2023       5/19/2023 Cancer Staged    Staging form: Corpus Uteri - Carcinoma And Carcinosarcoma, AJCC 8th Edition  - Clinical stage from 5/19/2023: FIGO Stage IB (cT1b, cN0(sn), cM0) - Signed by Lindsay Kingston MD on 10/31/2023     8/15/2023 - 8/28/2023  Radiation    Radiation OncologyTreatment Course:  Anabell Vera received 3000 cGy in 5 fractions to upper vagina via High Dose Radiation - HDR.           Past Medical History:   Diagnosis Date    Acquired female bladder prolapse 01/2022    Eczema 03/21/2018    Endometrial cancer 03/29/2023    Female bladder prolapse 01/2022    Hematuria, microscopic 2012    W/u by urology was (-)    High cholesterol 2016    Started Rx after TIA    History of brachytherapy 08/28/2023    vaginal brachytherapy    History of radiation therapy 08/2023    proximal vagina for endometrial CA    Hypertension 2016    Hyperthyroidism 1985    Resolved w/o Tx after ~ 6 months    PFO (patent foramen ovale) 10/2016    per echo    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    Right kidney mass 2016    found by Dr Cameron Hall    Single kidney 01/26/2018    TIA (transient ischemic attack) 10/2016       Past Surgical History:   Procedure Laterality Date    BASAL CELL CARCINOMA EXCISION  2006    left temple    COLONOSCOPY  10/10/2019    Dr Mattson, 5 yr repeat family hx colon ca    COLONOSCOPY W/ POLYPECTOMY  10/14/2024    Dr Mattson, hyperplastic polyp    LAPAROSCOPIC CHOLECYSTECTOMY  12/04/2012    LAPAROSCOPIC NEPHRECTOMY Right 01/26/2018    Oncocytoma    OOPHORECTOMY      TOTAL LAPAROSCOPIC HYSTERECTOMY SALPINGO OOPHORECTOMY N/A 05/09/2023    Procedure: TOTAL LAPAROSCOPIC HYSTERECTOMY BILATERAL SALPINGO-OOPHORECTOMY, INJECTION FOR SENTINEL LYMPH NODE MAPPING, BILATERAL SENTINEL LYMPH NODE DISSECTION WITH DAVINCI ROBOT;  Surgeon: Lindsay Kingston MD;  Location: [facility];  Service: Robotics - DaVinci;  Laterality: N/A;    WISDOM TOOTH EXTRACTION  1959       MEDICATIONS:    Current Outpatient Medications:     acetaminophen (TYLENOL) 325 MG tablet, Take 2 tablets by mouth Every 4 (Four) Hours As Needed for Mild Pain., Disp: 60 tablet, Rfl: 0    atorvastatin (LIPITOR) 80 MG tablet, Take 0.5 tablets by mouth every night at bedtime., Disp: 45 tablet, Rfl: 1     Calcium-Magnesium-Vitamin D 600-300-400 liquid, Take  by mouth Daily. 2 tbsp daily, Disp: , Rfl:     clopidogrel (PLAVIX) 75 MG tablet, Take 1 tablet by mouth Daily., Disp: 90 tablet, Rfl: 1    fluticasone (FLONASE) 50 MCG/ACT nasal spray, Administer 2 sprays into the nostril(s) as directed by provider Daily., Disp: , Rfl:     hydrocortisone 1 % cream, As Needed for Irritation or Rash. PRN, Disp: , Rfl:     ketoconazole (NIZORAL) 2 % cream, Apply 1 Application topically to the appropriate area as directed Every Night., Disp: 30 g, Rfl: 0    Multiple Vitamins-Minerals (MULTI VITAMIN/MINERALS PO), Take 1 tablet by mouth Daily., Disp: , Rfl:     ramipril (ALTACE) 10 MG capsule, Take 1 capsule by mouth Daily., Disp: 90 capsule, Rfl: 1     Allergies:  is allergic to motrin [ibuprofen], wheat, amoxicillin, garlic, latex, penicillins, and polytrim [polymyxin b-trimethoprim].    Social History:   Social History     Socioeconomic History    Marital status:    Tobacco Use    Smoking status: Former     Current packs/day: 0.00     Average packs/day: 0.3 packs/day for 2.0 years (0.5 ttl pk-yrs)     Types: Cigarettes     Start date:      Quit date:      Years since quittin.3    Smokeless tobacco: Never    Tobacco comments:     2 years only   Vaping Use    Vaping status: Never Used   Substance and Sexual Activity    Alcohol use: Yes     Comment: rarely drinks wine    Drug use: No    Sexual activity: Not Currently     Partners: Male       Family History:    Family History   Problem Relation Age of Onset    Arthritis Mother     Diabetes Mother     Heart failure Mother     Cancer Father     Colon cancer Father     Pneumonia Father     Diabetes Sister     Heart attack Sister     Multiple sclerosis Sister     Diabetes Brother     Heart attack Brother     Cancer Brother     Colon polyps Brother     Prostate cancer Brother     Cancer Brother         on lips    Diabetes Son     Diabetes Maternal Grandmother      Cervical cancer Maternal Grandmother     Ovarian cancer Neg Hx     Breast cancer Neg Hx        Health Maintenance:    Health Maintenance   Topic Date Due    ZOSTER VACCINE (1 of 2) Never done    Pneumococcal Vaccine 50+ (2 of 2 - PPSV23) 12/30/2016    COVID-19 Vaccine (8 - Pfizer risk 2024-25 season) 04/23/2025    INFLUENZA VACCINE  07/01/2025    ANNUAL WELLNESS VISIT  09/30/2025    LIPID PANEL  01/17/2026    DXA SCAN  01/23/2026    COLORECTAL CANCER SCREENING  10/14/2029    TDAP/TD VACCINES (3 - Td or Tdap) 01/17/2035    RSV Vaccine - Adults  Completed    MAMMOGRAM  Discontinued       Review of Systems:  Please refer to history of present illness.  Review of systems otherwise negative.    There were no vitals filed for this visit.    There is no height or weight on file to calculate BMI.  Wt Readings from Last 3 Encounters:   02/04/25 59 kg (130 lb 1.6 oz)   02/03/25 59.3 kg (130 lb 12.8 oz)   01/30/25 59.9 kg (132 lb)     Physical Exam:  GENERAL: Alert, well-appearing female appearing her stated age who is in no apparent distress.   HEENT: Sclera anicteric. Head normocephalic, atraumatic. Mucus membranes moist.   NECK: Trachea midline, supple, without masses.  No thyromegaly.   BREASTS: Deferred  CARDIOVASCULAR: Normal rate, regular rhythm, no murmurs, rubs, or gallops. No peripheral edema.  RESPIRATORY: Clear to auscultation bilaterally, normal respiratory effort  BACK:  No CVA tenderness, no vertebral tenderness on palpation  GASTROINTESTINAL:  Abdomen is soft, non-tender, non-distended, no rebound or guarding, no masses, or hernias.   SKIN:  Warm, dry, well-perfused.  All visible areas intact.  No rashes, lesions, ulcers.  PSYCHIATRIC: AO x3, with appropriate affect, normal thought processes.  NEUROLOGIC: No focal deficits. Moves extremities well.  MUSCULOSKELETAL: Normal gait and station. *Reproducible pain with palpation at the left paraspinous muscle  EXTREMITIES:   No cyanosis, clubbing,  "symmetric.  LYMPHATICS:  No cervical or inguinal adenopathy noted.     PELVIC exam:  External genitalia are free from concerning lesion.  On speculum examination, the vaginal cuff was intact and remarkable for erosive changes.  There was a ridge of erythema without active bleeding.  There was significant pelvic organ prolapse.  On bimanual examination, no fullness was appreciated.  Uterus, cervix and adnexa were absent.  There was no significant tenderness.  Rectovaginal exam was deferred.    ECOG PS 0    PROCEDURES: Vaginal biopsy was performed after obtaining informed consent.  1% lidocaine was used to inject the area x 4 mL.  Tissue biopsy forceps was used to perform biopsy.  Silver nitrate was applied for hemostasis.  Minimal blood loss, well-tolerated.    Diagnostic Data:    5/8/24  Final Diagnosis   VAGINAL BIOPSY:   Acute erosive vaginitis with stromal edema  No viral changes or fungal organisms identified on routine stains  Negative for dysplasia or malignancy         Lab Results   Component Value Date    WBC 3.88 02/28/2025    HGB 13.2 02/28/2025    HCT 38.9 02/28/2025    MCV 90.9 02/28/2025     02/28/2025    NEUTROABS 1.72 02/28/2025    GLUCOSE 86 02/28/2025    BUN 20 02/28/2025    CREATININE 1.04 (H) 02/28/2025    EGFRIFNONA 49 (L) 03/11/2021     02/28/2025    K 4.6 02/28/2025     02/28/2025    CO2 26.7 02/28/2025    PHOS 3.0 01/04/2023    CALCIUM 9.5 02/28/2025    ALBUMIN 4.3 01/17/2025    AST 26 01/17/2025    ALT 16 01/17/2025    BILITOT 0.5 01/17/2025     Lab Results   Component Value Date    TSH 3.710 01/10/2024    TSH 3.320 03/30/2022    TSH 3.540 08/21/2020    TSH 3.700 08/15/2019     No results found for: \"FT4\"  No results found for: \"\"      Assessment & Plan   This is a 85 y.o. woman with endometrial cancer who presents for surveillance     Encounter Diagnosis   Name Primary?    History of endometrial cancer Yes       Stage 1B endometrial cancer; JENNY  -Cancer history as " above  -Completed treatment in 08/2023  - Recurrent vaginal bleeding.  Biopsy obtained.  Discussed possibility of vaginal estrogen and patient was hesitant to consider this.      Pain assessment was performed today as a part of patient’s care.  For patients with pain related to surgery, gynecologic malignancy or cancer treatment, the plan is as noted in the assessment/plan.  For patients with pain not related to these issues, they are to seek any further needed care from a more appropriate provider, such as PCP.      No orders of the defined types were placed in this encounter.    FOLLOW UP: 3 months with APRN, call for pathology in 1 week    I spent 20 minutes caring for Anabell on this date of service. This time includes time spent by me in the following activities: preparing for the visit, reviewing tests, performing a medically appropriate examination and/or evaluation, counseling and educating the patient/family/caregiver, referring and communicating with other health care professionals, documenting information in the medical record, care coordination, and ordering test(s)  I spent 7 minutes on the separately reported service of biopsy. This time is not included in the time used to support the E/M service also reported today.      Suzette Marr MD  04/14/25  20:04 EDT

## 2025-04-16 RX ORDER — RAMIPRIL 10 MG/1
10 CAPSULE ORAL DAILY
Qty: 90 CAPSULE | Refills: 1 | Status: SHIPPED | OUTPATIENT
Start: 2025-04-16

## 2025-04-17 ENCOUNTER — RESULTS FOLLOW-UP (OUTPATIENT)
Dept: GYNECOLOGIC ONCOLOGY | Facility: CLINIC | Age: 85
End: 2025-04-17
Payer: MEDICARE

## 2025-04-17 NOTE — TELEPHONE ENCOUNTER
RN attempted to call. Left VM- will reattempt to reach patient at a later time.   ----- Message from Suzette Marr sent at 4/17/2025  4:03 PM EDT -----  Please notify patient of benign pathology.  If she would like a trial of vaginal estrogen we can do that.  Thank you  ----- Message -----  From: Lab, Background User  Sent: 4/17/2025  10:07 AM EDT  To: Suzette Marr MD

## 2025-04-18 NOTE — TELEPHONE ENCOUNTER
----- Message from Suzette Marr sent at 4/17/2025  4:03 PM EDT -----  Please notify patient of benign pathology.  If she would like a trial of vaginal estrogen we can do that.  Thank you  ----- Message -----  From: Lab, Background User  Sent: 4/17/2025  10:07 AM EDT  To: Suzette Marr MD

## 2025-04-18 NOTE — TELEPHONE ENCOUNTER
RN called patient was able to speak to her Patient was informed of benign pathology. Patient was also offered estrogen vaginal cream to help with symptoms going forward. Patient said that she would like to think about it as she is aware that it is a temporary help and does not fazal without some risks.   RN told patient to call if she decided she would like the estrogen cream. Patient verbalized understanding.

## 2025-05-12 ENCOUNTER — TELEPHONE (OUTPATIENT)
Dept: INTERNAL MEDICINE | Facility: CLINIC | Age: 85
End: 2025-05-12
Payer: MEDICARE

## 2025-05-12 DIAGNOSIS — E78.2 MIXED HYPERLIPIDEMIA: ICD-10-CM

## 2025-05-12 DIAGNOSIS — D72.821 MONOCYTOSIS: Primary | ICD-10-CM

## 2025-05-12 DIAGNOSIS — I63.9 ACUTE CVA (CEREBROVASCULAR ACCIDENT): ICD-10-CM

## 2025-05-12 NOTE — TELEPHONE ENCOUNTER
Caller: Anabell Vera    Relationship: Self    Best call back number: [phone]    What orders are you requesting (i.e. lab or imaging): LABS    In what timeframe would the patient need to come in: ASAP    Where will you receive your lab/imaging services: OFFICE    Additional notes: MONOCYTES WERE HIGH, WANTING REPEAT LABS TO SEE IF THEY ARE STILL HIGH BEFORE HER APPOINTMENT WITH HEMOLOGY ON 05/19/25

## 2025-05-12 NOTE — TELEPHONE ENCOUNTER
Dr. Toscano made a referral to hematology for the elevated monocytes.  Can you enter an order to be repeated?

## 2025-05-13 RX ORDER — CLOPIDOGREL BISULFATE 75 MG/1
75 TABLET ORAL DAILY
Qty: 90 TABLET | Refills: 1 | Status: SHIPPED | OUTPATIENT
Start: 2025-05-13

## 2025-05-13 RX ORDER — ATORVASTATIN CALCIUM 80 MG/1
40 TABLET, FILM COATED ORAL
Qty: 45 TABLET | Refills: 0 | Status: SHIPPED | OUTPATIENT
Start: 2025-05-13

## 2025-05-14 ENCOUNTER — LAB (OUTPATIENT)
Dept: INTERNAL MEDICINE | Facility: CLINIC | Age: 85
End: 2025-05-14
Payer: MEDICARE

## 2025-05-14 DIAGNOSIS — D72.821 MONOCYTOSIS: ICD-10-CM

## 2025-05-14 LAB
DEPRECATED RDW RBC AUTO: 41 FL (ref 37–54)
ERYTHROCYTE [DISTWIDTH] IN BLOOD BY AUTOMATED COUNT: 12.5 % (ref 12.3–15.4)
HCT VFR BLD AUTO: 39.7 % (ref 34–46.6)
HGB BLD-MCNC: 13.2 G/DL (ref 12–15.9)
MCH RBC QN AUTO: 30.4 PG (ref 26.6–33)
MCHC RBC AUTO-ENTMCNC: 33.2 G/DL (ref 31.5–35.7)
MCV RBC AUTO: 91.5 FL (ref 79–97)
PLATELET # BLD AUTO: 146 10*3/MM3 (ref 140–450)
PMV BLD AUTO: 11.3 FL (ref 6–12)
RBC # BLD AUTO: 4.34 10*6/MM3 (ref 3.77–5.28)
WBC NRBC COR # BLD AUTO: 5.39 10*3/MM3 (ref 3.4–10.8)

## 2025-05-14 PROCEDURE — 85025 COMPLETE CBC W/AUTO DIFF WBC: CPT | Performed by: NURSE PRACTITIONER

## 2025-05-14 PROCEDURE — 36415 COLL VENOUS BLD VENIPUNCTURE: CPT | Performed by: NURSE PRACTITIONER

## 2025-05-14 PROCEDURE — 85027 COMPLETE CBC AUTOMATED: CPT | Performed by: NURSE PRACTITIONER

## 2025-05-15 LAB
BASOPHILS # BLD AUTO: 0.02 10*3/MM3 (ref 0–0.2)
BASOPHILS NFR BLD AUTO: 0.4 % (ref 0–1.5)
DEPRECATED RDW RBC AUTO: 41 FL (ref 37–54)
EOSINOPHIL # BLD AUTO: 0.06 10*3/MM3 (ref 0–0.4)
EOSINOPHIL NFR BLD AUTO: 1.1 % (ref 0.3–6.2)
ERYTHROCYTE [DISTWIDTH] IN BLOOD BY AUTOMATED COUNT: 12.5 % (ref 12.3–15.4)
HCT VFR BLD AUTO: 39.7 % (ref 34–46.6)
HGB BLD-MCNC: 13.2 G/DL (ref 12–15.9)
IMM GRANULOCYTES # BLD AUTO: 0.01 10*3/MM3 (ref 0–0.05)
IMM GRANULOCYTES NFR BLD AUTO: 0.2 % (ref 0–0.5)
LYMPHOCYTES # BLD AUTO: 1.47 10*3/MM3 (ref 0.7–3.1)
LYMPHOCYTES NFR BLD AUTO: 27.1 % (ref 19.6–45.3)
MCH RBC QN AUTO: 30.4 PG (ref 26.6–33)
MCHC RBC AUTO-ENTMCNC: 33.2 G/DL (ref 31.5–35.7)
MCV RBC AUTO: 91.5 FL (ref 79–97)
MONOCYTES # BLD AUTO: 0.92 10*3/MM3 (ref 0.1–0.9)
MONOCYTES NFR BLD AUTO: 17 % (ref 5–12)
NEUTROPHILS NFR BLD AUTO: 2.94 10*3/MM3 (ref 1.7–7)
NEUTROPHILS NFR BLD AUTO: 54.2 % (ref 42.7–76)
NRBC BLD AUTO-RTO: 0 /100 WBC (ref 0–0.2)
PLATELET # BLD AUTO: 146 10*3/MM3 (ref 140–450)
PMV BLD AUTO: 11.3 FL (ref 6–12)
RBC # BLD AUTO: 4.34 10*6/MM3 (ref 3.77–5.28)
WBC NRBC COR # BLD AUTO: 5.39 10*3/MM3 (ref 3.4–10.8)

## 2025-05-19 ENCOUNTER — CONSULT (OUTPATIENT)
Dept: ONCOLOGY | Facility: CLINIC | Age: 85
End: 2025-05-19
Payer: MEDICARE

## 2025-05-19 ENCOUNTER — LAB (OUTPATIENT)
Dept: LAB | Facility: HOSPITAL | Age: 85
End: 2025-05-19
Payer: MEDICARE

## 2025-05-19 VITALS
HEIGHT: 62 IN | RESPIRATION RATE: 18 BRPM | OXYGEN SATURATION: 98 % | BODY MASS INDEX: 25.76 KG/M2 | DIASTOLIC BLOOD PRESSURE: 85 MMHG | HEART RATE: 81 BPM | SYSTOLIC BLOOD PRESSURE: 202 MMHG | WEIGHT: 140 LBS | TEMPERATURE: 98.1 F

## 2025-05-19 DIAGNOSIS — C84.00 MYCOSIS FUNGOIDES, UNSPECIFIED BODY REGION: ICD-10-CM

## 2025-05-19 DIAGNOSIS — C84.00 MYCOSIS FUNGOIDES, UNSPECIFIED BODY REGION: Primary | ICD-10-CM

## 2025-05-19 LAB
BASOPHILS # BLD AUTO: 0.02 10*3/MM3 (ref 0–0.2)
BASOPHILS NFR BLD AUTO: 0.4 % (ref 0–1.5)
DEPRECATED RDW RBC AUTO: 47.5 FL (ref 37–54)
EOSINOPHIL # BLD AUTO: 0.05 10*3/MM3 (ref 0–0.4)
EOSINOPHIL NFR BLD AUTO: 1.1 % (ref 0.3–6.2)
ERYTHROCYTE [DISTWIDTH] IN BLOOD BY AUTOMATED COUNT: 13.5 % (ref 12.3–15.4)
HCT VFR BLD AUTO: 37.3 % (ref 34–46.6)
HGB BLD-MCNC: 12.2 G/DL (ref 12–15.9)
IMM GRANULOCYTES # BLD AUTO: 0.05 10*3/MM3 (ref 0–0.05)
IMM GRANULOCYTES NFR BLD AUTO: 1.1 % (ref 0–0.5)
LYMPHOCYTES # BLD AUTO: 1.53 10*3/MM3 (ref 0.7–3.1)
LYMPHOCYTES NFR BLD AUTO: 33 % (ref 19.6–45.3)
MCH RBC QN AUTO: 31 PG (ref 26.6–33)
MCHC RBC AUTO-ENTMCNC: 32.7 G/DL (ref 31.5–35.7)
MCV RBC AUTO: 94.9 FL (ref 79–97)
MONOCYTES # BLD AUTO: 0.82 10*3/MM3 (ref 0.1–0.9)
MONOCYTES NFR BLD AUTO: 17.7 % (ref 5–12)
NEUTROPHILS NFR BLD AUTO: 2.16 10*3/MM3 (ref 1.7–7)
NEUTROPHILS NFR BLD AUTO: 46.7 % (ref 42.7–76)
PLATELET # BLD AUTO: 121 10*3/MM3 (ref 140–450)
PMV BLD AUTO: 11.4 FL (ref 6–12)
RBC # BLD AUTO: 3.93 10*6/MM3 (ref 3.77–5.28)
WBC NRBC COR # BLD AUTO: 4.63 10*3/MM3 (ref 3.4–10.8)

## 2025-05-19 PROCEDURE — 85025 COMPLETE CBC W/AUTO DIFF WBC: CPT

## 2025-05-19 PROCEDURE — 36415 COLL VENOUS BLD VENIPUNCTURE: CPT

## 2025-05-19 PROCEDURE — 85060 BLOOD SMEAR INTERPRETATION: CPT

## 2025-05-19 NOTE — LETTER
May 19, 2025     Alayna PEREZ MD  [address]  [address]  [facility] [address]    Patient: Anabell Vera   YOB: 1940   Date of Visit: 5/19/2025     Dear Alayna PEREZ MD:       Thank you for referring Anabell Vera to me for evaluation. Below are the relevant portions of my assessment and plan of care.    If you have questions, please do not hesitate to call me. I look forward to following Anabell along with you.         Sincerely,        Zhen Mendiola MD        CC: MD Osman Preciado MD Charles L Papp, MD Azhar Aslam, MD Marta S Hayne, MD Hope M. Cottrill, MD Hicks, Zhen TABARES MD  05/19/25 [id number]  Sign when Signing Visit  CHIEF COMPLAINT: Asymptomatic mild elevation of monocyte count    REASON FOR REFERRAL: Asymptomatic elevation of monocyte count      RECORDS OBTAINED  Records of the patients history including those obtained from primary care were reviewed and summarized in detail.    Oncology/Hematology History Overview Note   1.  Borderline monocytosis: Patient with chronic mild elevations of percentage of monocytes 13-18% with occasional elevation of absolute monocyte count upper limit of normal is 0.9.  5/14/2025 absolute neutrophil count 0.92 and January 2025 was 0.91 otherwise absolute neutrophil count remains in the normal range on a multiplicity of CBCs over time.  2.  History of endometrial carcinoma followed by Dr. 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  Laparoscopic hysterectomy bilateral salpingo-oophorectomy Dr. Kingston 5/19/2023 FIGO stage Ib T1b N0 received adjuvant radiation upper vagina ending 8/28/2023  3.  Kidney mass followed by Dr. Mike Hall 2016.  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  TIA with PFO October 2016  5.  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  Her chronic mild elevation of monocytes is unlikely relevant.  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  She is not aware of having been worked up for Sezary's.  I think the odds of the monocytosis being due to Sezary syndrome is unlikely and her absolute monocyte count is just barely above the upper end of normal.  She has no signs or symptoms of recent viral illnesses but when her last monocyte count was elevated she had had a viral infection of her eye.  I will have pathologist review her peripheral smear for circulating Sezary cells and check peripheral blood flow cytometry for T and B-cell gene rearrangements.Her hypertension is out of control and I asked her to get with her primary care immediately on this.     Endometrial cancer   3/29/2023 Initial Diagnosis    Endometrial cancer  Referred by Dr. 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 MMR intact  TVUS with uterus measuring 4.9x3.1x4.6 cm with EMS 13.0 mm. Normal appearing ovaries.      5/19/2023 Surgery    Robotic-assisted total laparoscopic hysterectomy with bilateral salpingo-oophorectomy, injection for sentinel lymph node dissection, and pelvic sentinel lymph node dissection    Pathology demonstrates a grade 2 endometrial cancer with 90% MI. Absent LVSI. Negative cervix and adnexa. Negative sentinel pelvic lymph nodes.    Surgery at [facility] by Lindsay Kingston MD          Cancer Staged    Cancer Staging   Endometrial cancer  Staging form: Corpus Uteri - Carcinoma And Carcinosarcoma, AJCC 8th Edition  - Clinical stage from 5/19/2023: FIGO Stage IB (cT1b, cN0(sn), cM0) - Signed by Lindsay Kingston MD on 10/31/2023       5/19/2023 Cancer Staged    Staging form: Corpus Uteri -  Carcinoma And Carcinosarcoma, AJCC 8th Edition  - Clinical stage from 5/19/2023: FIGO Stage IB (cT1b, cN0(sn), cM0) - Signed by Lindsay Kingston MD on 10/31/2023     8/15/2023 - 8/28/2023 Radiation    Radiation OncologyTreatment Course:  Anabell Vera received 3000 cGy in 5 fractions to upper vagina via High Dose Radiation - HDR.         HISTORY OF PRESENT ILLNESS:  The patient is a 85 y.o.  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    Past Medical History:   Diagnosis Date   • Acquired female bladder prolapse 01/2022   • Eczema 03/21/2018   • Endometrial cancer 03/29/2023   • Female bladder prolapse 01/2022   • Hematuria, microscopic 2012    W/u by urology was (-)   • High cholesterol 2016    Started Rx after TIA   • History of brachytherapy 08/28/2023    vaginal brachytherapy   • History of radiation therapy 08/2023    proximal vagina for endometrial CA   • Hypertension 2016   • Hyperthyroidism 1985    Resolved w/o Tx after ~ 6 months   • PFO (patent foramen ovale) 10/2016    per echo   • 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   • Right kidney mass 2016    found by Dr Cameron Hall   • Single kidney 01/26/2018   • TIA (transient ischemic attack) 10/2016     Past Surgical History:   Procedure Laterality Date   • BASAL CELL CARCINOMA EXCISION  2006    left temple   • COLONOSCOPY  10/10/2019    Dr Mattson, 5 yr repeat family hx colon ca   • COLONOSCOPY W/ POLYPECTOMY  10/14/2024    Dr Mattson, hyperplastic polyp   • LAPAROSCOPIC CHOLECYSTECTOMY   12/04/2012   • LAPAROSCOPIC NEPHRECTOMY Right 01/26/2018    Oncocytoma   • OOPHORECTOMY     • TOTAL LAPAROSCOPIC HYSTERECTOMY SALPINGO OOPHORECTOMY N/A 05/09/2023    Procedure: TOTAL LAPAROSCOPIC HYSTERECTOMY BILATERAL SALPINGO-OOPHORECTOMY, INJECTION FOR SENTINEL LYMPH NODE MAPPING, BILATERAL SENTINEL LYMPH NODE DISSECTION WITH DAVINCI ROBOT;  Surgeon: Lindsay Kingston MD;  Location: [facility];  Service: Robotics - DaVinci;  Laterality: N/A;   • WISDOM TOOTH EXTRACTION  1959       Current Outpatient Medications on File Prior to Visit   Medication Sig Dispense Refill   • acetaminophen (TYLENOL) 325 MG tablet Take 2 tablets by mouth Every 4 (Four) Hours As Needed for Mild Pain. 60 tablet 0   • atorvastatin (LIPITOR) 80 MG tablet TAKE 1/2 TABLET BY MOUTH EVERY NIGHT AT BEDTIME 45 tablet 0   • Calcium-Magnesium-Vitamin D 600-300-400 liquid Take  by mouth Daily. 2 tbsp daily     • clopidogrel (PLAVIX) 75 MG tablet TAKE 1 TABLET BY MOUTH DAILY 90 tablet 1   • fluticasone (FLONASE) 50 MCG/ACT nasal spray Administer 2 sprays into the nostril(s) as directed by provider As Needed for Allergies.     • hydrocortisone 1 % cream As Needed for Irritation or Rash. PRN     • ketoconazole (NIZORAL) 2 % cream Apply 1 Application topically to the appropriate area as directed Every Night. 30 g 0   • Multiple Vitamins-Minerals (MULTI VITAMIN/MINERALS PO) Take 1 tablet by mouth Daily.     • ramipril (ALTACE) 10 MG capsule Take 1 capsule by mouth Daily. 90 capsule 1     No current facility-administered medications on file prior to visit.       Allergies   Allergen Reactions   • Motrin [Ibuprofen] Other (See Comments)     ONLY HAS ONE KIDNEY, NO IBUPROFEN   • Wheat Rash     Gluten   • Amoxicillin Rash and Other (See Comments)     amoxicillin trihydrate   • Garlic Itching   • Latex Rash   • Penicillins Rash   • Polytrim [Polymyxin B-Trimethoprim] Other (See Comments)     Red eye       Social History     Socioeconomic History   • Marital  "status:    Tobacco Use   • Smoking status: Former     Current packs/day: 0.00     Average packs/day: 0.3 packs/day for 2.0 years (0.5 ttl pk-yrs)     Types: Cigarettes     Start date:      Quit date:      Years since quittin.4   • Smokeless tobacco: Never   • Tobacco comments:     2 years only   Vaping Use   • Vaping status: Never Used   Substance and Sexual Activity   • Alcohol use: Yes     Comment: rarely drinks wine   • Drug use: No   • Sexual activity: Not Currently     Partners: Male       Family History   Problem Relation Age of Onset   • Arthritis Mother    • Diabetes Mother    • Heart failure Mother    • Cancer Father    • Colon cancer Father    • Pneumonia Father    • Diabetes Sister    • Heart attack Sister    • Multiple sclerosis Sister    • Diabetes Brother    • Heart attack Brother    • Cancer Brother    • Colon polyps Brother    • Prostate cancer Brother    • Cancer Brother         on lips   • Diabetes Maternal Grandmother    • Cervical cancer Maternal Grandmother    • Diabetes Son    • Ovarian cancer Neg Hx    • Breast cancer Neg Hx        PHYSICAL EXAM:  No jaundice icterus or pallor.  No respiratory distress.  No rashes.  No palpable cervical axillary or inguinal adenopathy.    BP (!) 207/83   Pulse 81   Temp 98.1 °F (36.7 °C)   Resp 18   Ht 157.5 cm (62\")   Wt 63.5 kg (140 lb)   LMP  (LMP Unknown)   SpO2 98%   BMI 25.61 kg/m²     ECOG score: 2           ECOG: (2) Ambulatory & Capable of Self Care, Unable to Carry Out Work Activity, Up & About Greater Than 50% of Waking Hours    Lab Results   Component Value Date    HGB 13.2 2025    HGB 13.2 2025    HCT 39.7 2025    HCT 39.7 2025    MCV 91.5 2025    MCV 91.5 2025     2025     2025    WBC 5.39 2025    WBC 5.39 2025    NEUTROABS 2.94 2025    LYMPHSABS 1.47 2025    MONOSABS 0.92 (H) 2025    EOSABS 0.06 2025    BASOSABS 0.02 " 05/14/2025     Lab Results   Component Value Date    GLUCOSE 86 02/28/2025    BUN 20 02/28/2025    CREATININE 1.04 (H) 02/28/2025     02/28/2025    K 4.6 02/28/2025     02/28/2025    CO2 26.7 02/28/2025    CALCIUM 9.5 02/28/2025    PROTEINTOT 7.2 01/17/2025    ALBUMIN 4.3 01/17/2025    BILITOT 0.5 01/17/2025    ALKPHOS 62 01/17/2025    AST 26 01/17/2025    ALT 16 01/17/2025         Assessment & Plan  1.  Borderline monocytosis: Patient with chronic mild elevations of percentage of monocytes 13-18% with occasional elevation of absolute monocyte count upper limit of normal is 0.9.  5/14/2025 absolute neutrophil count 0.92 and January 2025 was 0.91 otherwise absolute neutrophil count remains in the normal range on a multiplicity of CBCs over time.  2.  History of endometrial carcinoma followed by Dr. 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  Laparoscopic hysterectomy bilateral salpingo-oophorectomy Dr. Kingston 5/19/2023 FIGO stage Ib T1b N0 received adjuvant radiation upper vagina ending 8/28/2023  3.  Kidney mass followed by Dr. Mike Hall 2016.  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  TIA with PFO October 2016  5.  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  Her chronic mild elevation of monocytes is unlikely relevant.  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  She is not aware of having been worked up for Sezary's.  I think the odds of the monocytosis being due to Sezary syndrome is unlikely and her absolute monocyte count is just barely above the upper end of normal.  She has no signs or symptoms of recent viral  illnesses but when her last monocyte count was elevated she had had a viral infection of her eye.  I will have pathologist review her peripheral smear for circulating Sezary cells and check peripheral blood flow cytometry for T and B-cell gene rearrangements.  Her hypertension is out of control and I asked her to get with her primary care immediately on this.    Total time of care today inclusive of time spent today prior to her arrival reviewing past history and outlining as above and during visit translating to patient the data and going over this differential diagnosis of monocytosis and after visit instituting this plan took 1 hour patient care time throughout the day today.      Zhen Mendiola MD    5/19/2025

## 2025-05-19 NOTE — PROGRESS NOTES
CHIEF COMPLAINT: Asymptomatic mild elevation of monocyte count    REASON FOR REFERRAL: Asymptomatic elevation of monocyte count      RECORDS OBTAINED  Records of the patients history including those obtained from primary care were reviewed and summarized in detail.    Oncology/Hematology History Overview Note   1.  Borderline monocytosis: Patient with chronic mild elevations of percentage of monocytes 13-18% with occasional elevation of absolute monocyte count upper limit of normal is 0.9.  5/14/2025 absolute neutrophil count 0.92 and January 2025 was 0.91 otherwise absolute neutrophil count remains in the normal range on a multiplicity of CBCs over time.  2.  History of endometrial carcinoma followed by Dr. 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  Laparoscopic hysterectomy bilateral salpingo-oophorectomy Dr. Kingston 5/19/2023 FIGO stage Ib T1b N0 received adjuvant radiation upper vagina ending 8/28/2023  3.  Kidney mass followed by Dr. Mike Hall 2016.  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  TIA with PFO October 2016  5.  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  Her chronic mild elevation of monocytes is unlikely relevant.  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  She is not aware of having been worked up for Sezary's.  I think the odds of the monocytosis being due to Sezary syndrome is unlikely and her absolute monocyte count is just barely above the upper end of normal.  She has no signs or symptoms of recent viral illnesses but when her last monocyte count was elevated she had had a viral infection of her eye.  I will  have pathologist review her peripheral smear for circulating Sezary cells and check peripheral blood flow cytometry for T and B-cell gene rearrangements.Her hypertension is out of control and I asked her to get with her primary care immediately on this.     Endometrial cancer   3/29/2023 Initial Diagnosis    Endometrial cancer  Referred by Dr. 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 MMR intact  TVUS with uterus measuring 4.9x3.1x4.6 cm with EMS 13.0 mm. Normal appearing ovaries.      5/19/2023 Surgery    Robotic-assisted total laparoscopic hysterectomy with bilateral salpingo-oophorectomy, injection for sentinel lymph node dissection, and pelvic sentinel lymph node dissection    Pathology demonstrates a grade 2 endometrial cancer with 90% MI. Absent LVSI. Negative cervix and adnexa. Negative sentinel pelvic lymph nodes.    Surgery at [facility] by Lindsay Kingston MD          Cancer Staged    Cancer Staging   Endometrial cancer  Staging form: Corpus Uteri - Carcinoma And Carcinosarcoma, AJCC 8th Edition  - Clinical stage from 5/19/2023: FIGO Stage IB (cT1b, cN0(sn), cM0) - Signed by Lindsay Kingston MD on 10/31/2023       5/19/2023 Cancer Staged    Staging form: Corpus Uteri - Carcinoma And Carcinosarcoma, AJCC 8th Edition  - Clinical stage from 5/19/2023: FIGO Stage IB (cT1b, cN0(sn), cM0) - Signed by Lindsay Kingston MD on 10/31/2023     8/15/2023 - 8/28/2023 Radiation    Radiation OncologyTreatment Course:  Anabell Vera received 3000 cGy in 5 fractions to upper vagina via High Dose Radiation - HDR.         HISTORY OF PRESENT ILLNESS:  The patient is a 85 y.o.  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    Past Medical History:   Diagnosis Date    Acquired female bladder prolapse 01/2022    Eczema 03/21/2018    Endometrial cancer 03/29/2023    Female bladder prolapse 01/2022    Hematuria, microscopic 2012    W/u by urology was (-)    High cholesterol 2016    Started Rx after TIA    History of brachytherapy 08/28/2023    vaginal brachytherapy    History of radiation therapy 08/2023    proximal vagina for endometrial CA    Hypertension 2016    Hyperthyroidism 1985    Resolved w/o Tx after ~ 6 months    PFO (patent foramen ovale) 10/2016    per echo    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    Right kidney mass 2016    found by Dr Cameron Hall    Single kidney 01/26/2018    TIA (transient ischemic attack) 10/2016     Past Surgical History:   Procedure Laterality Date    BASAL CELL CARCINOMA EXCISION  2006    left temple    COLONOSCOPY  10/10/2019    Dr Mattson, 5 yr repeat family hx colon ca    COLONOSCOPY W/ POLYPECTOMY  10/14/2024    Dr Mattson, hyperplastic polyp    LAPAROSCOPIC CHOLECYSTECTOMY  12/04/2012    LAPAROSCOPIC NEPHRECTOMY Right 01/26/2018    Oncocytoma    OOPHORECTOMY      TOTAL LAPAROSCOPIC HYSTERECTOMY SALPINGO OOPHORECTOMY N/A 05/09/2023    Procedure: TOTAL LAPAROSCOPIC HYSTERECTOMY BILATERAL SALPINGO-OOPHORECTOMY, INJECTION FOR SENTINEL LYMPH NODE MAPPING, BILATERAL SENTINEL LYMPH NODE DISSECTION WITH DAVINCI ROBOT;  Surgeon: Lindsay Kingston MD;  Location: [facility];  Service: Robotics - DaVinci;  Laterality: N/A;    WISDOM TOOTH EXTRACTION  1959       Current Outpatient Medications on File Prior to Visit   Medication Sig Dispense Refill    acetaminophen (TYLENOL) 325 MG tablet Take 2 tablets by mouth Every 4 (Four) Hours As Needed for Mild Pain. 60 tablet 0     atorvastatin (LIPITOR) 80 MG tablet TAKE 1/2 TABLET BY MOUTH EVERY NIGHT AT BEDTIME 45 tablet 0    Calcium-Magnesium-Vitamin D 600-300-400 liquid Take  by mouth Daily. 2 tbsp daily      clopidogrel (PLAVIX) 75 MG tablet TAKE 1 TABLET BY MOUTH DAILY 90 tablet 1    fluticasone (FLONASE) 50 MCG/ACT nasal spray Administer 2 sprays into the nostril(s) as directed by provider As Needed for Allergies.      hydrocortisone 1 % cream As Needed for Irritation or Rash. PRN      ketoconazole (NIZORAL) 2 % cream Apply 1 Application topically to the appropriate area as directed Every Night. 30 g 0    Multiple Vitamins-Minerals (MULTI VITAMIN/MINERALS PO) Take 1 tablet by mouth Daily.      ramipril (ALTACE) 10 MG capsule Take 1 capsule by mouth Daily. 90 capsule 1     No current facility-administered medications on file prior to visit.       Allergies   Allergen Reactions    Motrin [Ibuprofen] Other (See Comments)     ONLY HAS ONE KIDNEY, NO IBUPROFEN    Wheat Rash     Gluten    Amoxicillin Rash and Other (See Comments)     amoxicillin trihydrate    Garlic Itching    Latex Rash    Penicillins Rash    Polytrim [Polymyxin B-Trimethoprim] Other (See Comments)     Red eye       Social History     Socioeconomic History    Marital status:    Tobacco Use    Smoking status: Former     Current packs/day: 0.00     Average packs/day: 0.3 packs/day for 2.0 years (0.5 ttl pk-yrs)     Types: Cigarettes     Start date:      Quit date:      Years since quittin.4    Smokeless tobacco: Never    Tobacco comments:     2 years only   Vaping Use    Vaping status: Never Used   Substance and Sexual Activity    Alcohol use: Yes     Comment: rarely drinks wine    Drug use: No    Sexual activity: Not Currently     Partners: Male       Family History   Problem Relation Age of Onset    Arthritis Mother     Diabetes Mother     Heart failure Mother     Cancer Father     Colon cancer Father     Pneumonia Father     Diabetes Sister     Heart  "attack Sister     Multiple sclerosis Sister     Diabetes Brother     Heart attack Brother     Cancer Brother     Colon polyps Brother     Prostate cancer Brother     Cancer Brother         on lips    Diabetes Maternal Grandmother     Cervical cancer Maternal Grandmother     Diabetes Son     Ovarian cancer Neg Hx     Breast cancer Neg Hx        PHYSICAL EXAM:  No jaundice icterus or pallor.  No respiratory distress.  No rashes.  No palpable cervical axillary or inguinal adenopathy.    BP (!) 207/83   Pulse 81   Temp 98.1 °F (36.7 °C)   Resp 18   Ht 157.5 cm (62\")   Wt 63.5 kg (140 lb)   LMP  (LMP Unknown)   SpO2 98%   BMI 25.61 kg/m²     ECOG score: 2           ECOG: (2) Ambulatory & Capable of Self Care, Unable to Carry Out Work Activity, Up & About Greater Than 50% of Waking Hours    Lab Results   Component Value Date    HGB 13.2 05/14/2025    HGB 13.2 05/14/2025    HCT 39.7 05/14/2025    HCT 39.7 05/14/2025    MCV 91.5 05/14/2025    MCV 91.5 05/14/2025     05/14/2025     05/14/2025    WBC 5.39 05/14/2025    WBC 5.39 05/14/2025    NEUTROABS 2.94 05/14/2025    LYMPHSABS 1.47 05/14/2025    MONOSABS 0.92 (H) 05/14/2025    EOSABS 0.06 05/14/2025    BASOSABS 0.02 05/14/2025     Lab Results   Component Value Date    GLUCOSE 86 02/28/2025    BUN 20 02/28/2025    CREATININE 1.04 (H) 02/28/2025     02/28/2025    K 4.6 02/28/2025     02/28/2025    CO2 26.7 02/28/2025    CALCIUM 9.5 02/28/2025    PROTEINTOT 7.2 01/17/2025    ALBUMIN 4.3 01/17/2025    BILITOT 0.5 01/17/2025    ALKPHOS 62 01/17/2025    AST 26 01/17/2025    ALT 16 01/17/2025         Assessment & Plan   1.  Borderline monocytosis: Patient with chronic mild elevations of percentage of monocytes 13-18% with occasional elevation of absolute monocyte count upper limit of normal is 0.9.  5/14/2025 absolute neutrophil count 0.92 and January 2025 was 0.91 otherwise absolute neutrophil count remains in the normal range on a multiplicity " of CBCs over time.  2.  History of endometrial carcinoma followed by Dr. 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  Laparoscopic hysterectomy bilateral salpingo-oophorectomy Dr. Kingston 5/19/2023 FIGO stage Ib T1b N0 received adjuvant radiation upper vagina ending 8/28/2023  3.  Kidney mass followed by Dr. Mike Hall 2016.  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  TIA with PFO October 2016  5.  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  Her chronic mild elevation of monocytes is unlikely relevant.  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  She is not aware of having been worked up for Sezary's.  I think the odds of the monocytosis being due to Sezary syndrome is unlikely and her absolute monocyte count is just barely above the upper end of normal.  She has no signs or symptoms of recent viral illnesses but when her last monocyte count was elevated she had had a viral infection of her eye.  I will have pathologist review her peripheral smear for circulating Sezary cells and check peripheral blood flow cytometry for T and B-cell gene rearrangements.  Her hypertension is out of control and I asked her to get with her primary care immediately on this.    Total time of care today inclusive of time spent today prior to her arrival reviewing past history and outlining as above and during visit translating to patient the data and going over this differential diagnosis of monocytosis and after visit instituting this plan took 1 hour patient care time throughout the day today.      Zhen Mendiola MD    5/19/2025

## 2025-05-20 LAB
CYTOLOGIST CVX/VAG CYTO: NORMAL
PATH INTERP BLD-IMP: NORMAL

## 2025-05-22 LAB — Lab: NORMAL

## 2025-06-09 ENCOUNTER — OFFICE VISIT (OUTPATIENT)
Dept: ONCOLOGY | Facility: CLINIC | Age: 85
End: 2025-06-09
Payer: MEDICARE

## 2025-06-09 VITALS
HEIGHT: 62 IN | TEMPERATURE: 99 F | OXYGEN SATURATION: 98 % | HEART RATE: 73 BPM | BODY MASS INDEX: 25.03 KG/M2 | DIASTOLIC BLOOD PRESSURE: 74 MMHG | SYSTOLIC BLOOD PRESSURE: 157 MMHG | WEIGHT: 136 LBS | RESPIRATION RATE: 18 BRPM

## 2025-06-09 DIAGNOSIS — C54.1 ENDOMETRIAL CANCER: Primary | ICD-10-CM

## 2025-06-09 PROCEDURE — 3078F DIAST BP <80 MM HG: CPT | Performed by: INTERNAL MEDICINE

## 2025-06-09 PROCEDURE — 1160F RVW MEDS BY RX/DR IN RCRD: CPT | Performed by: INTERNAL MEDICINE

## 2025-06-09 PROCEDURE — 3077F SYST BP >= 140 MM HG: CPT | Performed by: INTERNAL MEDICINE

## 2025-06-09 PROCEDURE — 1126F AMNT PAIN NOTED NONE PRSNT: CPT | Performed by: INTERNAL MEDICINE

## 2025-06-09 PROCEDURE — 1159F MED LIST DOCD IN RCRD: CPT | Performed by: INTERNAL MEDICINE

## 2025-06-09 PROCEDURE — 99214 OFFICE O/P EST MOD 30 MIN: CPT | Performed by: INTERNAL MEDICINE

## 2025-06-09 NOTE — LETTER
June 9, 2025     Alayna PEREZ MD  [address]  [address]  [facility] [address]    Patient: Anabell Vera   YOB: 1940   Date of Visit: 6/9/2025     Dear Alayna PEREZ MD:       Thank you for referring Anabell Vera to me for evaluation. Below are the relevant portions of my assessment and plan of care.    If you have questions, please do not hesitate to call me. I look forward to following Anabell along with you.         Sincerely,        Zhen Mendiola MD        CC: MD Osman Preciado MD Charles L Papp, MD Azhar Aslam, MD Marta S Hayne, MD Hope M. Cottrill, MD Hicks, Zhen TABARES MD  06/09/25 1400  Sign when Signing Visit  CHIEF COMPLAINT: For follow-up of abnormal CBC    Problem List:  Oncology/Hematology History Overview Note   1.  Borderline monocytosis: Patient with chronic mild elevations of percentage of monocytes 13-18% with occasional elevation of absolute monocyte count upper limit of normal is 0.9.  5/14/2025 absolute neutrophil count 0.92 and January 2025 was 0.91 otherwise absolute neutrophil count remains in the normal range on a multiplicity of CBCs over time.  2.  History of endometrial carcinoma followed by Dr. 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  Laparoscopic hysterectomy bilateral salpingo-oophorectomy Dr. Kingston 5/19/2023 FIGO stage Ib T1b N0 received adjuvant radiation upper vagina ending 8/28/2023  3.  Kidney mass followed by Dr. Mike Hall 2016.  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  TIA with PFO October 2016  5.  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  Her chronic mild elevation of monocytes is unlikely relevant.  She has had a history of endometrial cancer as well as a cutaneous  lymphoma most consistent with mycosis fungoides treated with topical steroid in 2016 without recurrence.  She is not aware of having been worked up for Sezary's.  I think the odds of the monocytosis being due to Sezary syndrome is unlikely and her absolute monocyte count is just barely above the upper end of normal.  She has no signs or symptoms of recent viral illnesses but when her last monocyte count was elevated she had had a viral infection of her eye.  I will have pathologist review her peripheral smear for circulating Sezary cells and check peripheral blood flow cytometry for T and B-cell gene rearrangements.Her hypertension is out of control and I asked her to get with her primary care immediately on this.     Endometrial cancer   3/29/2023 Initial Diagnosis    Endometrial cancer  Referred by Dr. 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 MMR intact  TVUS with uterus measuring 4.9x3.1x4.6 cm with EMS 13.0 mm. Normal appearing ovaries.      5/19/2023 Surgery    Robotic-assisted total laparoscopic hysterectomy with bilateral salpingo-oophorectomy, injection for sentinel lymph node dissection, and pelvic sentinel lymph node dissection    Pathology demonstrates a grade 2 endometrial cancer with 90% MI. Absent LVSI. Negative cervix and adnexa. Negative sentinel pelvic lymph nodes.    [facility]          Cancer Staged    Cancer Staging   Endometrial cancer  Staging form: Corpus Uteri - Carcinoma And Carcinosarcoma, AJCC 8th Edition  - Clinical stage from 5/19/2023: FIGO Stage IB (cT1b, cN0(sn), cM0) - Signed by Lindsay Kingston MD on 10/31/2023       5/19/2023 Cancer Staged    Staging form: Corpus Uteri - Carcinoma And Carcinosarcoma, AJCC 8th Edition  - Clinical stage from 5/19/2023: FIGO Stage IB (cT1b, cN0(sn), cM0) - Signed by Lindsay Kingston MD on 10/31/2023     8/15/2023 - 8/28/2023 Radiation    Radiation  OncologyTreatment Course:  Anabell Vera received 3000 cGy in 5 fractions to upper vagina via High Dose Radiation - HDR.         HISTORY OF PRESENT ILLNESS:  The patient is a 85 y.o. female, here for follow up on management of borderline monocytosis with history of endometrial carcinoma    Past Medical History:   Diagnosis Date   • Acquired female bladder prolapse 01/2022   • Eczema 03/21/2018   • Endometrial cancer 03/29/2023   • Female bladder prolapse 01/2022   • Hematuria, microscopic 2012    W/u by urology was (-)   • High cholesterol 2016    Started Rx after TIA   • History of brachytherapy 08/28/2023    vaginal brachytherapy   • History of radiation therapy 08/2023    proximal vagina for endometrial CA   • Hypertension 2016   • Hyperthyroidism 1985    Resolved w/o Tx after ~ 6 months   • PFO (patent foramen ovale) 10/2016    per echo   • 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   • Right kidney mass 2016    found by Dr Cameron Hall   • Single kidney 01/26/2018   • TIA (transient ischemic attack) 10/2016     Past Surgical History:   Procedure Laterality Date   • BASAL CELL CARCINOMA EXCISION  2006    left temple   • COLONOSCOPY  10/10/2019    Dr Mattson, 5 yr repeat family hx colon ca   • COLONOSCOPY W/ POLYPECTOMY  10/14/2024    Dr Mattson, hyperplastic polyp   • LAPAROSCOPIC CHOLECYSTECTOMY  12/04/2012   • LAPAROSCOPIC NEPHRECTOMY Right 01/26/2018    Oncocytoma   • OOPHORECTOMY     • TOTAL LAPAROSCOPIC HYSTERECTOMY SALPINGO OOPHORECTOMY N/A 05/09/2023    Procedure: TOTAL LAPAROSCOPIC HYSTERECTOMY BILATERAL SALPINGO-OOPHORECTOMY, INJECTION FOR SENTINEL LYMPH NODE MAPPING, BILATERAL SENTINEL LYMPH NODE DISSECTION WITH [employer] ROBOT;  Surgeon: Lindsay Kingston MD;  Location: [facility];  Service: Robotics - [employer];  Laterality: N/A;   • WISDOM TOOTH EXTRACTION  1959       Allergies   Allergen Reactions   • Motrin [Ibuprofen] Other (See Comments)     ONLY HAS ONE KIDNEY, NO IBUPROFEN   • Wheat Rash     Gluten   •  "Amoxicillin Rash and Other (See Comments)     amoxicillin trihydrate   • Garlic Itching   • Latex Rash   • Penicillins Rash   • Polytrim [Polymyxin B-Trimethoprim] Other (See Comments)     Red eye       Family History and Social History reviewed and changed as necessary    REVIEW OF SYSTEM:   No new somatic complaints    PHYSICAL EXAM:  Jaundice icterus or pallor.  No respiratory distress no petechiae or ecchymoses.  No rashes.  No palpable cervical or axillary adenopathy.    Vitals:    06/09/25 1349   BP: 157/74   Pulse: 73   Resp: 18   Temp: 99 °F (37.2 °C)   SpO2: 98%   Weight: 61.7 kg (136 lb)   Height: 157.5 cm (62\")     Vitals:    06/09/25 1349   PainSc: 0-No pain          ECOG score: 2           Vitals reviewed.    ECOG: (2) Ambulatory & Capable of Self Care, Unable to Carry Out Work Activity, Up & About Greater Than 50% of Waking Hours    Lab Results   Component Value Date    HGB 12.2 05/19/2025    HCT 37.3 05/19/2025    MCV 94.9 05/19/2025     (L) 05/19/2025    WBC 4.63 05/19/2025    NEUTROABS 2.16 05/19/2025    LYMPHSABS 1.53 05/19/2025    MONOSABS 0.82 05/19/2025    EOSABS 0.05 05/19/2025    BASOSABS 0.02 05/19/2025       Lab Results   Component Value Date    GLUCOSE 86 02/28/2025    BUN 20 02/28/2025    CREATININE 1.04 (H) 02/28/2025     02/28/2025    K 4.6 02/28/2025     02/28/2025    CO2 26.7 02/28/2025    CALCIUM 9.5 02/28/2025    PROTEINTOT 7.2 01/17/2025    ALBUMIN 4.3 01/17/2025    BILITOT 0.5 01/17/2025    ALKPHOS 62 01/17/2025    AST 26 01/17/2025    ALT 16 01/17/2025             ASSESSMENT & PLAN:  1.  Borderline monocytosis: Patient with chronic mild elevations of percentage of monocytes 13-18% with occasional elevation of absolute monocyte count upper limit of normal is 0.9.  5/14/2025 absolute neutrophil count 0.92 and January 2025 was 0.91 otherwise absolute neutrophil count remains in the normal range on a multiplicity of CBCs over time.  2.  History of endometrial " carcinoma followed by Dr. 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  Laparoscopic hysterectomy bilateral salpingo-oophorectomy Dr. Kingston 5/19/2023 FIGO stage Ib T1b N0 received adjuvant radiation upper vagina ending 8/28/2023  3.  Kidney mass followed by Dr. Mike Hall 2016.  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  TIA with PFO October 2016  5.  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  Her chronic mild elevation of monocytes is unlikely relevant.  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  She is not aware of having been worked up for Sezary's.  I think the odds of the monocytosis being due to Sezary syndrome is unlikely and her absolute monocyte count is just barely above the upper end of normal.  She has no signs or symptoms of recent viral illnesses but when her last monocyte count was elevated she had had a viral infection of her eye.  I will have pathologist review her peripheral smear for circulating Sezary cells and check peripheral blood flow cytometry for T and B-cell gene rearrangements.  Her hypertension is out of control and I asked her to get with her primary care immediately on this.     - 5/19/2025 platelets 121,000 with chronic increased percentage of monocytes but normal absolute monocyte count.  Peripheral smear reviewed by hematopathologist Dr. Eulogio Allen normal white blood cell count and no abnormal or immature white cells.  Borderline thrombocytopenia with no platelet clumping or schistocytes.  Normochromic normocytic anemia.  Flow cytometry negative for any T-cell B-cell or myeloid clones.    -  6/9/2025 [facility] hematology follow-up:: No Sezary cells mentioned on peripheral smear nor monocytosis.  The mild increased percentage of monocytes without significant increase in absolute monocyte count is irrelevant and not pathological.  Nothing on flow cytometry to suggest clonal T-cell disorder to associate with her history of mycosis fungoides remotely.  Mild thrombocytopenia may be related to prior pelvic radiation from her adjuvant therapy for endometrial cancer.  As long as platelets are staying over 100,000 whether this is low-level ITP or much less likely a marrow impairment or DIC given her general fitness, I would not put her through additional testing.  Platelets drop below 100,000, by itself that is not an emergency but I would be glad to see her back to follow her at that point otherwise she will follow-up with her primary care with a blood count a couple of times a year.  I would add parenthetically the absolute neutrophil count has remained normal.  Follow-up with Dr. Marr in company relative to her history of endometrial cancer.    Total time of care today inclusive of time spent today prior to patient's arrival reviewing plethora of interval data as outlined above and during visit translating that patient putting forth plan as outlined took 35 minutes patient care time throughout the day today.  Zhen Mendiola MD    06/09/2025

## 2025-06-09 NOTE — PROGRESS NOTES
CHIEF COMPLAINT: For follow-up of abnormal CBC    Problem List:  Oncology/Hematology History Overview Note   1.  Borderline monocytosis: Patient with chronic mild elevations of percentage of monocytes 13-18% with occasional elevation of absolute monocyte count upper limit of normal is 0.9.  5/14/2025 absolute neutrophil count 0.92 and January 2025 was 0.91 otherwise absolute neutrophil count remains in the normal range on a multiplicity of CBCs over time.  2.  History of endometrial carcinoma followed by Dr. 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  Laparoscopic hysterectomy bilateral salpingo-oophorectomy Dr. Kingston 5/19/2023 FIGO stage Ib T1b N0 received adjuvant radiation upper vagina ending 8/28/2023  3.  Kidney mass followed by Dr. Mike Hall 2016.  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  TIA with PFO October 2016  5.  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  Her chronic mild elevation of monocytes is unlikely relevant.  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  She is not aware of having been worked up for Sezary's.  I think the odds of the monocytosis being due to Sezary syndrome is unlikely and her absolute monocyte count is just barely above the upper end of normal.  She has no signs or symptoms of recent viral illnesses but when her last monocyte count was elevated she had had a viral infection of her eye.  I will have pathologist review her peripheral smear for circulating Sezary cells and check peripheral blood flow cytometry for T and B-cell gene rearrangements.Her hypertension is out of control and I asked her to  get with her primary care immediately on this.     Endometrial cancer   3/29/2023 Initial Diagnosis    Endometrial cancer  Referred by Dr. 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 MMR intact  TVUS with uterus measuring 4.9x3.1x4.6 cm with EMS 13.0 mm. Normal appearing ovaries.      5/19/2023 Surgery    Robotic-assisted total laparoscopic hysterectomy with bilateral salpingo-oophorectomy, injection for sentinel lymph node dissection, and pelvic sentinel lymph node dissection    Pathology demonstrates a grade 2 endometrial cancer with 90% MI. Absent LVSI. Negative cervix and adnexa. Negative sentinel pelvic lymph nodes.    [facility]          Cancer Staged    Cancer Staging   Endometrial cancer  Staging form: Corpus Uteri - Carcinoma And Carcinosarcoma, AJCC 8th Edition  - Clinical stage from 5/19/2023: FIGO Stage IB (cT1b, cN0(sn), cM0) - Signed by Lindsay Kingston MD on 10/31/2023       5/19/2023 Cancer Staged    Staging form: Corpus Uteri - Carcinoma And Carcinosarcoma, AJCC 8th Edition  - Clinical stage from 5/19/2023: FIGO Stage IB (cT1b, cN0(sn), cM0) - Signed by Lindsay Kingston MD on 10/31/2023     8/15/2023 - 8/28/2023 Radiation    Radiation OncologyTreatment Course:  Anabell Vera received 3000 cGy in 5 fractions to upper vagina via High Dose Radiation - HDR.         HISTORY OF PRESENT ILLNESS:  The patient is a 85 y.o. female, here for follow up on management of borderline monocytosis with history of endometrial carcinoma    Past Medical History:   Diagnosis Date    Acquired female bladder prolapse 01/2022    Eczema 03/21/2018    Endometrial cancer 03/29/2023    Female bladder prolapse 01/2022    Hematuria, microscopic 2012    W/u by urology was (-)    High cholesterol 2016    Started Rx after TIA    History of brachytherapy 08/28/2023    vaginal brachytherapy    History of radiation therapy 08/2023     "proximal vagina for endometrial CA    Hypertension 2016    Hyperthyroidism 1985    Resolved w/o Tx after ~ 6 months    PFO (patent foramen ovale) 10/2016    per echo    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    Right kidney mass 2016    found by Dr Cameron Hall    Single kidney 01/26/2018    TIA (transient ischemic attack) 10/2016     Past Surgical History:   Procedure Laterality Date    BASAL CELL CARCINOMA EXCISION  2006    left temple    COLONOSCOPY  10/10/2019    Dr Mattson, 5 yr repeat family hx colon ca    COLONOSCOPY W/ POLYPECTOMY  10/14/2024    Dr Mattson, hyperplastic polyp    LAPAROSCOPIC CHOLECYSTECTOMY  12/04/2012    LAPAROSCOPIC NEPHRECTOMY Right 01/26/2018    Oncocytoma    OOPHORECTOMY      TOTAL LAPAROSCOPIC HYSTERECTOMY SALPINGO OOPHORECTOMY N/A 05/09/2023    Procedure: TOTAL LAPAROSCOPIC HYSTERECTOMY BILATERAL SALPINGO-OOPHORECTOMY, INJECTION FOR SENTINEL LYMPH NODE MAPPING, BILATERAL SENTINEL LYMPH NODE DISSECTION WITH [employer] ROBOT;  Surgeon: Lindsay Kingston MD;  Location: [facility];  Service: Robotics - [employer]inc[employer];  Laterality: N/A;    WISDOM TOOTH EXTRACTION  1959       Allergies   Allergen Reactions    Motrin [Ibuprofen] Other (See Comments)     ONLY HAS ONE KIDNEY, NO IBUPROFEN    Wheat Rash     Gluten    Amoxicillin Rash and Other (See Comments)     amoxicillin trihydrate    Garlic Itching    Latex Rash    Penicillins Rash    Polytrim [Polymyxin B-Trimethoprim] Other (See Comments)     Red eye       Family History and Social History reviewed and changed as necessary    REVIEW OF SYSTEM:   No new somatic complaints    PHYSICAL EXAM:  Jaundice icterus or pallor.  No respiratory distress no petechiae or ecchymoses.  No rashes.  No palpable cervical or axillary adenopathy.    Vitals:    06/09/25 1349   BP: 157/74   Pulse: 73   Resp: 18   Temp: 99 °F (37.2 °C)   SpO2: 98%   Weight: 61.7 kg (136 lb)   Height: 157.5 cm (62\")     Vitals:    06/09/25 1349   PainSc: 0-No pain          ECOG score: 2       "     Vitals reviewed.    ECOG: (2) Ambulatory & Capable of Self Care, Unable to Carry Out Work Activity, Up & About Greater Than 50% of Waking Hours    Lab Results   Component Value Date    HGB 12.2 05/19/2025    HCT 37.3 05/19/2025    MCV 94.9 05/19/2025     (L) 05/19/2025    WBC 4.63 05/19/2025    NEUTROABS 2.16 05/19/2025    LYMPHSABS 1.53 05/19/2025    MONOSABS 0.82 05/19/2025    EOSABS 0.05 05/19/2025    BASOSABS 0.02 05/19/2025       Lab Results   Component Value Date    GLUCOSE 86 02/28/2025    BUN 20 02/28/2025    CREATININE 1.04 (H) 02/28/2025     02/28/2025    K 4.6 02/28/2025     02/28/2025    CO2 26.7 02/28/2025    CALCIUM 9.5 02/28/2025    PROTEINTOT 7.2 01/17/2025    ALBUMIN 4.3 01/17/2025    BILITOT 0.5 01/17/2025    ALKPHOS 62 01/17/2025    AST 26 01/17/2025    ALT 16 01/17/2025             ASSESSMENT & PLAN:  1.  Borderline monocytosis: Patient with chronic mild elevations of percentage of monocytes 13-18% with occasional elevation of absolute monocyte count upper limit of normal is 0.9.  5/14/2025 absolute neutrophil count 0.92 and January 2025 was 0.91 otherwise absolute neutrophil count remains in the normal range on a multiplicity of CBCs over time.  2.  History of endometrial carcinoma followed by Dr. 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  Laparoscopic hysterectomy bilateral salpingo-oophorectomy Dr. Kingston 5/19/2023 FIGO stage Ib T1b N0 received adjuvant radiation upper vagina ending 8/28/2023  3.  Kidney mass followed by Dr. Mike Hall 2016.  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  TIA with PFO October 2016  5.  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  Her chronic mild elevation of monocytes is unlikely  relevant.  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  She is not aware of having been worked up for Sezary's.  I think the odds of the monocytosis being due to Sezary syndrome is unlikely and her absolute monocyte count is just barely above the upper end of normal.  She has no signs or symptoms of recent viral illnesses but when her last monocyte count was elevated she had had a viral infection of her eye.  I will have pathologist review her peripheral smear for circulating Sezary cells and check peripheral blood flow cytometry for T and B-cell gene rearrangements.  Her hypertension is out of control and I asked her to get with her primary care immediately on this.     - 5/19/2025 platelets 121,000 with chronic increased percentage of monocytes but normal absolute monocyte count.  Peripheral smear reviewed by hematopathologist Dr. Eulogio Allen normal white blood cell count and no abnormal or immature white cells.  Borderline thrombocytopenia with no platelet clumping or schistocytes.  Normochromic normocytic anemia.  Flow cytometry negative for any T-cell B-cell or myeloid clones.    - 6/9/2025 [facility] hematology follow-up:: No Sezary cells mentioned on peripheral smear nor monocytosis.  The mild increased percentage of monocytes without significant increase in absolute monocyte count is irrelevant and not pathological.  Nothing on flow cytometry to suggest clonal T-cell disorder to associate with her history of mycosis fungoides remotely.  Mild thrombocytopenia may be related to prior pelvic radiation from her adjuvant therapy for endometrial cancer.  As long as platelets are staying over 100,000 whether this is low-level ITP or much less likely a marrow impairment or DIC given her general fitness, I would not put her through additional testing.  Platelets drop below 100,000, by itself  that is not an emergency but I would be glad to see her back to follow her at that point otherwise she will follow-up with her primary care with a blood count a couple of times a year.  I would add parenthetically the absolute neutrophil count has remained normal.  Follow-up with Dr. Marr in company relative to her history of endometrial cancer.    Total time of care today inclusive of time spent today prior to patient's arrival reviewing plethora of interval data as outlined above and during visit translating that patient putting forth plan as outlined took 35 minutes patient care time throughout the day today.  Zhen Mendiola MD    06/09/2025

## 2025-06-10 NOTE — PATIENT INSTRUCTIONS
Surgery Instructions            Anabell Vera  [id number]  1940      SURGEON:  Thee    Surgery Coordinator: Meenu HARRY    Gynecological Oncology  [address] [facility] [address] [place] [address], [facility]  Phone: [phone]                   Fax: [phone]      Pre-Admission Testing Appointment    You have a Pre-Admission Testing (PAT) appointment on 5/2/23  at 930.  You will need to be at hospital registration 10 minutes before that time. Directions to PAT and Registration will be listed below.    We understand your time is valuable. To prevent delays, please bring the following to your PAT apt. if it applies to you:  Written physician orders (if given to you by your physician)  All medications in the original bottles including over-the-counter medications (not a list)  Copy of living will or power of  documents   Copy of recent test results (EKG, stress test, echo, heart cath, etc.)  Copy of pacemaker or ICD cards and date of last interrogation   Copy of cardiac clearance letter from your cardiologist or primary care physician if history of heart problems  Name and phone number of your pharmacy, primary care physician and/or cardiologist  CPAP or BiPAP settings    Surgery Appointment      Your surgery has been scheduled on 5/9/23.  You will need to go to Main Registration to check in at 0600. Then you will be sent to the [address] [place] [address] second floor surgery registration desk to check in.    Nothing by mouth after midnight on 5/8/23.    If you are feeling sick, have a fever or cough and have seen your PCP let our office know 48 hours prior to surgery. It may be subject to rescheduling.       The Day of Surgery:    Do not chew gum or tobacco, smoke, or eat mints or hard candy. Shower and wash your hair. You may brush your teeth but do not swallow water. Use any wipes that Pre-admission testing has given you.     Please arrive for surgery as instructed by the pre-op nurse,  Recorded Pressure: PA, HR=58, Condition=Condition 1 (Pulmonary Artery) PA 48/14/30 often one to two hours before your surgery.  Once you are called to go to your pre-op room, no one will be allowed in the pre op room.   Please note no one under age 12 is permitted to stay in the waiting area without supervision.  Remove all jewelry, including rings and piercings. Do not bring valuables to the hospital.  Wear loose-fitting clothing.  Avoid wearing eye makeup or contact lenses  We make every effort to begin surgery at your scheduled start time but delays do occur. We will keep you and your family updated about any delays  Please note: you MUST have a  over the age of 18 to drive you home from the hospital. You may not use Uber, Lyft or a taxi.    Please remember to bring:    Photo ID and current medical insurance card  Advanced directives, living will or power of  (if applicable)  Current list of all medications, including over-the- counter and herbal supplements  List of allergies  CPAP device if you have sleep apnea  Any assistive devices or equipment needed after surgery    While You are In the Pre-Op Room:  The nurse will review your health history and will place an IV (into the vein) in your hand or arm for fluids and medicines.  An anesthesia provider will talk with you about anesthesia and pain control during and after surgery.  A member of the surgical team can answer your questions.    Directions to [facility]  [address] [facility] ? [place] [address] ? [phone]    From [address] [facility]:  Take [address] to the [employer] [address] exit. Go right on [address] to [address]. Right on [address] to [facility].   Left on [address] to [facility] which is on the left.    From [address] [facility]:  Get off [address] at the [address] exit. Go left on [address] to [address]. Right on [address] to [address]. Left on   [address] to [facility] which is on the left.     From the  South ([address]):  Follow [address] to approximately one mile inside [facility] [address]. Psychiatric is on the right at [address] and   [facility].     Parking:  Free  Parking - Take Entrance 2 off of [address] and go straight ahead to [address] [place] [address].  Self Parking - Take Entrance 1 off of [address], bear left and follow the road to [facility].    Directions to Registration:  If entering through front of [address] [place] [address] ( parking), take a right and proceed up the hallway connecting [address] [place] [address] to   [address] [place] [address]. Registration is on the left about [facility] up the [address].    If entering from [facility], take garage elevator to first floor (1), exit to the right and proceed through the doors to outside, follow the covered sidewalk to entrance of [facility], follow signs to [address] [place] [address], this leads to the [facility] lobby and information desk. Proceed past the information desk to the hallway that connects [address] [place] [address] to the [facility]. Registration is on the left about [facility] up [address].    Directions to Pre-admission Testing:  Follow directions to Registration and Pre-admission Testing is next door to Registration             PREPARING FOR SURGERY  **Disability or Work Release Forms     Work: The amount of time you will be off work after surgery depends on both your surgery and your job. Discuss this with your doctor before surgery. If you have any questions about this, call your doctor.  You must provide all forms completed and signed to the GYN ONCOLOGY office.    FORM FOR AUHTHORIZATION FOR USE AND/OR DISCLOSURE OF PROCTED HEALTH INFORMATION CAN BE PROVIDED UPON REQUEST.    Preoperative Evaluation and Optimization  If your doctor tells you to get a preoperative evaluation from your primary care provider, cardiologist, or other specialist, it is your responsibility to make sure to complete these well before your surgery. We want you to get evaluated to make sure you are  "as healthy as possible when you have your surgery. If the evaluation, including all recommended testing, is not done in time, your surgery will be postponed.    If you take diabetic medications please consult with the prescriber.  Continue antidepressants, Beta Blockers \"olol\", anti-seizure medication, GERD medication (heartburn), Opioids and Parkinson's medication.  Let us know if you have a history of blood clots or are taking a blood thinner before your surgery, this will need to be held and you will need to discuss this with staff.   You are allowed 1 visitor that may remain in the waiting room at both locations.  Visitors cannot come back to pre-op or post-op areas.    Please note: you MUST have a  over the age of 18 to drive you home from the hospital. You may not use Uber, Lyft or a taxi.    Physical Fitness  Research shows that getting more physical activity before surgery can lower your risk for problems after surgery. Walking is a great way to improve your fitness level before surgery. Even if you start walking just a few weeks before surgery, it can make a big difference.     Quit Smoking  If you smoke, your risk of having a lung problem is at least twice that of a non-smoker.    Surgical incisions will not heal as well and you have a higher risk of infection  The heart has to work harder.  It is best to quit smoking 6 to 8 weeks before surgery. This gives your lungs more time to recover.    Outpatient Surgery  You will need to have someone bring you to the hospital, stay in the waiting room during your procedure and take you home at discharge. It is recommended that someone stay with you 24 hours after your procedure.     If you live more than a 4-hour drive away from the hospital, or live in an area without easy access to an emergency department, we recommend you plan to spend another night or two close to the hospital before you go home. For assistance with hotel, prices and vouchers let our " office know and we can let you talk with our Oncology Social worker, Nataliya Lopez.     Post-Operative Visit  You will be scheduled a post-operative appointment for 3 weeks after your surgical procedure. If you do not have an appointment please call the office and have that scheduled.     How to prevent nausea  The best way to prevent nausea is to eat frequent small meals. It is especially important to eat something before taking pain medication. Take your ondansetron if you are feeling nauseous do not wait.    Pain Management after Surgery    If you have kidney disease or liver disease and are not to take ibuprofen or Tylenol please let your doctor or nurse know.     Driving: Do not drive while you are taking prescription pain medications.     It is normal to have some pain after surgery. The goal of managing your acute pain after surgery is to minimize your pain so you feel comfortable enough to get up, take deep breaths, wash, get dressed, and do simple tasks in your home. Some discomfort is likely. We do not expect you to be completely free of pain.   Pain is usually worst the first 24-48 hours after surgery.    What can I do to relieve pain without medications?   Apply heat with a warm compress, hot water bottle, or heating pad. Do not put anything hot directly on your skin or lie on top of it.   Apply cooling with a cold gel pack, bag of peas, or crushed ice. Wrap in a soft cloth or towel.   Do not push or press on your incision. It is normal for your incision to be sore for up to 6 weeks if you push on it.   Unless your doctor gives you a different plan, ibuprofen and acetaminophen are the main medicines you will use to manage your pain.   You may also get a prescription for an opioid such as oxycodone or hydrocodone. Opioids should only be added as needed to reduce pain that is not adequately relieved by ibuprofen and acetaminophen.                                                                                          Typical Pain Medication Schedule  6 am Ibuprofen 600 mg   9 am acetaminophen 650 mg   12:00 pm Noon Ibuprofen 600 mg   3:00 pm Acetaminophen 650 mg   6:00 pm Ibuprofen 600 mg   9:00 pm Acetaminophen 650 mg   12:00 am Midnight  Ibuprofen 600 mg.      What if this schedule does not control my pain?   Please call the office and let us know at [phone]  Reduce the number and frequency of opioids as soon as you can. Do not take more opioid medication than your doctor has prescribed.   Common side effects and risks of opioids include drowsiness, mental confusion, dizziness, nausea, constipation, itching, dry mouth, and slowed breathing.   Never mix opioids with alcohol, sleep aids or anti-anxiety medications. These are dangerous combinations that increase the harmful effects of opioid pain medication. Many overdose deaths from opioids also involve at least one other drug or alcohol.   It is illegal to sell or share an opioid without a prescription properly issued by a licensed health care prescriber.    What is the best way to stop taking pain medications?  1. Stop opioid use.  2. Stop acetaminophen.  3. Gradually decrease how often you take ibuprofen. It is a good idea to take a 600 mg pill before you start a more tiring activity such as going shopping or for a long walk.  Once you get more active, you may have a day when your pain gets a little worse. If this happens, take ibuprofen. If ibuprofen does not relieve the pain, add acetaminophen.    What do I need to know about bowel movements?   Starting as soon as you get home, take 17 grams of Miralax (one capful) twice a day to keep your stool soft and prevent constipation. It is important to prevent constipation because straining can damage your stitches. Your stool should be as soft as toothpaste. If your stool gets too loose, cut back to using Miralax only once a day.   If you used a bowel prep before surgery, it is common not to have a bowel movement  on the first and second day after surgery.   If you have not had a bowel movement by 7 p.m. on the third day after surgery, do one of the following at bedtime:  Drink 1 ounce (2 tablespoons) of Milk of Magnesia (MOM). If you have used MOM before and know you need to take 2 ounces for it to work for you, it is OK to do this, or Take 2 Senekot tablets.   Go for short walks. Walking and being active will help you have a bowel movement.   If you have not had a bowel movement by noon on the fourth day after surgery, call the clinic where you were seen and ask to speak with a nurse.    What kind of vaginal bleeding is normal?  Spotting of pink or red blood from the vagina is normal. Brown-colored discharge that gradually changes to a light yellow or cream color is also normal and can last up to 6 weeks. The brownish discharge is old blood and often has a strong odor, this is okay. Call us if it becomes heavier or foul smelling or you are saturating a maxi pad within an hour.     At Home after Surgery: If you experience a medical emergency call 911 or have someone drive you to your nearest emergency department.     When should I call my doctor?  Call your doctor right away, any time of the day or night, including on weekends and holidays, if you have any of the following signs or symptoms:   A temperature over 100.4°F (38°C) If you don't have one, please buy a thermometer before your surgery.   Heavy bleeding (soaking a regular pad in an hour or less)   Severe pain in your abdomen or pelvis that the pain medication is not helping   Chest pain or difficulty breathing   Swelling, redness, or pain in your legs   An incision that opens   An incision that is red or hot   Fluid or blood leaking from an incision   New bruising after leaving the hospital that is large or spreading. A little bit of bruising around an incision is normal.   Nausea and vomiting    Skin rash   Unable to urinate at all   Pain or stinging when you pass  urine   Blood or cloudiness in your urine   Non-stop urge to pass urine, but only dribbling when you try to go   A sense that something is wrong.    Caring for post-surgical incisions     Do not have vaginal intercourse until your doctor evaluates you at a postop visit and tells you OK.     Showers: You may shower starting 24 hours after your surgery.    NO BATHS: do not take a tub bath up to 6 weeks after surgery.   Do not put any lotion, oil, gel, or powder on or near your incisions.     For incisions inside your vagina: Incisions inside the vagina are closed with dissolvable stitches. When they dissolve you may see little bits of suture material that look like thin pieces of string on your underwear or on toilet tissue after wiping. This is normal. Do not put anything inside the vagina until your doctor evaluates you at a postop visit and tells you when it will be OK.      For incisions on your skin: If there is a dressing over the incision, remove it before your first shower. Leave the slim adhesive strips that are under the dressing in place. During the week after surgery, they will usually curl up at the edges and then come off on their own. If they are still there a week after surgery, gently remove them.  To clean the incisions, first wash your hands, and then get your hands sudsy with soap and gently wash or let the sudsy water run down over the incisions. Dry the incisions well after washing by gently patting with a towel. You may use a blow dryer, but it must be on a low-heat setting.    When will my bladder function get back to normal?   You received extra fluid through your I.V. while you were in the hospital, so it is normal to urinate (pee) more than usual when you first get home.   It is normal for your bladder function to be different after surgery. You may notice a pause before your urine stream starts or that your urine stream is slower. This will gradually get better, but it may take up to 6  months before you are back to normal. Be patient, relax, and sit on the toilet a little longer.   Drinking more water than usual will not help the bladder recover faster.    What is a normal energy level?  It is normal to have a decreased energy level after surgery. Listen to your body. If you need to rest, do it. Give yourself permission to take it easy. Once you settle into a normal routine at home, you will find that you slowly begin to feel better. Walking around the house and taking short walks outside will help you get back to normal.    What kind of exercise/activities can I do?   Exercise is important for a healthy recovery. We encourage you to begin normal physical activity, like walking, within hours of surgery. Start with short walks and gradually increase the distance and length of time that you walk.   Allow your body time to heal. Do not restart a difficult exercise routine until you have had your post-op exam and your doctor says it is OK.   Lifting: Unless you are given other instructions, for 6 weeks after your surgery do not lift anything over 15 pounds.   Travel: It is best if you do not go far away from home before your postop visit with your doctor. If you have travel plans, talk to your doctor about this before your surgery.      Financial Assistance:    If you have any questions or need assistance, contact your [facility] financial counseling office from 8:30 a.m.-4:30  p.m. Monday through Friday. Closed weekends.   [place]: [phone] or, or visit at [address] [facility], [facility], near the entrance.  Financial Assistance Application available upon request      Patient Payments and Correspondence  Customer service representatives are available to assist you from 8:00 a.m. to 6:00 p.m. Eastern Standard Time by calling [phone] Monday through Friday. You can also contact us through [employer].    [facility]  [address]  [place] [address] [phone]

## 2025-07-16 ENCOUNTER — OFFICE VISIT (OUTPATIENT)
Dept: GYNECOLOGIC ONCOLOGY | Facility: CLINIC | Age: 85
End: 2025-07-16
Payer: MEDICARE

## 2025-07-16 VITALS
WEIGHT: 136 LBS | TEMPERATURE: 98 F | HEART RATE: 69 BPM | BODY MASS INDEX: 25.03 KG/M2 | OXYGEN SATURATION: 100 % | DIASTOLIC BLOOD PRESSURE: 77 MMHG | HEIGHT: 62 IN | RESPIRATION RATE: 17 BRPM | SYSTOLIC BLOOD PRESSURE: 180 MMHG

## 2025-07-16 DIAGNOSIS — Z85.42 HISTORY OF ENDOMETRIAL CANCER: Primary | ICD-10-CM

## 2025-07-16 NOTE — PROGRESS NOTES
GYN ONCOLOGY CANCER SURVEILLANCE FOLLOW-UP    Anabell Vera  [id number]  1940    Subjective   Chief Complaint: Follow up, endometrial cancer       History of present illness:   Anabell Vera is a 85 y.o. year old female who is here today for ongoing surveillance of Endometrial Cancer, see Cancer History. As of next month, she will be 2 years out since completion of treatment and doing well.  She has no acute complaints or concerns at this time. Denies any major changes in health since last visit.  A vaginal biopsy was performed last visit due to recurrence of vaginal bleeding-- which occurred after afternoon of strenuous activity doing yard work. Path was benign. She has not had any recurrence of bleeding since that time. She does note intermittent vaginal discharge that is white to slightly light yellow in color without odor.     Cancer History:   Oncology/Hematology History Overview Note   1.  Borderline monocytosis: Patient with chronic mild elevations of percentage of monocytes 13-18% with occasional elevation of absolute monocyte count upper limit of normal is 0.9.  5/14/2025 absolute neutrophil count 0.92 and January 2025 was 0.91 otherwise absolute neutrophil count remains in the normal range on a multiplicity of CBCs over time.  2.  History of endometrial carcinoma followed by Dr. 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  Laparoscopic hysterectomy bilateral salpingo-oophorectomy Dr. Kingston 5/19/2023 FIGO stage Ib T1b N0 received adjuvant radiation upper vagina ending 8/28/2023  3.  Kidney mass followed by Dr. Mike Hall 2016.  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  TIA with PFO October 2016  5.  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  Her  chronic mild elevation of monocytes is unlikely relevant.  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  She is not aware of having been worked up for Sezary's.  I think the odds of the monocytosis being due to Sezary syndrome is unlikely and her absolute monocyte count is just barely above the upper end of normal.  She has no signs or symptoms of recent viral illnesses but when her last monocyte count was elevated she had had a viral infection of her eye.  I will have pathologist review her peripheral smear for circulating Sezary cells and check peripheral blood flow cytometry for T and B-cell gene rearrangements.Her hypertension is out of control and I asked her to get with her primary care immediately on this.     Endometrial cancer   3/29/2023 Initial Diagnosis    Endometrial cancer  Referred by Dr. 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 MMR intact  TVUS with uterus measuring 4.9x3.1x4.6 cm with EMS 13.0 mm. Normal appearing ovaries.      5/19/2023 Surgery    Robotic-assisted total laparoscopic hysterectomy with bilateral salpingo-oophorectomy, injection for sentinel lymph node dissection, and pelvic sentinel lymph node dissection    Pathology demonstrates a grade 2 endometrial cancer with 90% MI. Absent LVSI. Negative cervix and adnexa. Negative sentinel pelvic lymph nodes.    [facility]          Cancer Staged    Cancer Staging   Endometrial cancer  Staging form: Corpus Uteri - Carcinoma And Carcinosarcoma, AJCC 8th Edition  - Clinical stage from 5/19/2023: FIGO Stage IB (cT1b, cN0(sn), cM0) - Signed by Lindsay Kingston MD on 10/31/2023       5/19/2023 Cancer Staged    Staging form: Corpus Uteri - Carcinoma And Carcinosarcoma, AJCC 8th Edition  - Clinical stage from 5/19/2023: FIGO  "Stage IB (cT1b, cN0(sn), cM0) - Signed by Lindsay Kingston MD on 10/31/2023     8/15/2023 - 8/28/2023 Radiation    Radiation OncologyTreatment Course:  Anabell Vera received 3000 cGy in 5 fractions to upper vagina via High Dose Radiation - HDR.           The current medication list and allergy list were reviewed and reconciled.     Past Medical History, Past Surgical History, Social History, Family History have been reviewed and are without significant changes except as mentioned.      Review of Systems  She denies vaginal bleeding. She does not have abdominal or pelvic pain. She is tolerating a regular diet and endorses a normal appetite. She denies nausea and vomiting. She denies early satiety and bloating. Denies any CP, SOB, lightheadedness or dizziness. She denies changes in her baseline bowel/bladder. No dysuria, frequency, urgency, hematuria or flank pain. Reports normal energy levels.       Objective   Physical Exam  Vital Signs: /77   Pulse 69   Temp 98 °F (36.7 °C) (Temporal)   Resp 17   Ht 157.5 cm (62.01\")   Wt 61.7 kg (136 lb)   LMP  (LMP Unknown)   SpO2 100%   BMI 24.87 kg/m²   Vitals:    07/16/25 1357   PainSc: 0-No pain           General Appearance:  alert, cooperative, no apparent distress, appears stated age, and normal weight   Neurologic/Psych: A&O x 3, gait steady, appropriate affect   HEENT:  Normocephalic, without obvious abnormality, mucous membranes moist   Abdomen:   Soft, non-tender, non-distended, and no organomegaly   Lymph nodes: No cervical, supraclavicular, inguinal adenopathy noted   Pelvic: External genitalia pale, atrophic. No concerning lesions. On speculum examination, the vaginal cuff was intact and remarkable for erosive changes. There was a ridge of erythema without active bleeding. There is significant pelvic organ prolapse. On bimanual examination, no fullness was appreciated. Uterus, cervix and adnexa were absent. There was no significant tenderness. " Rectovaginal exam was deferred.      ECOG score: 1                       Assessment and Plan:   This is an 85 year old woman with endometrial cancer who presents today for surveillance.     Stage 1B endometrial cancer  -Cancer history as above  -Completed treatment in 08/2023  - Has had intermittent vaginal bleeding. Two biopsies have been completed since completing treatment: 5-8-24 and 4-15-25, both with benign pathology. No recurrent bleeding since last visit. Reassured pt of stable pelvic exam today.   -Again discussed benefit vs risk of use with vaginal estrogen.   -She remains free of any clinical evidence of disease recurrence.   -As she is now 2 years out, we will space surveillance visits from every 3 to 6 months. She is understanding to call with any changes in pelvic symptoms or general GYN concerns at any time between regularly scheduled visits.        Diagnoses and all orders for this visit:    1. History of endometrial cancer (Primary)      Pain assessment was performed today as a part of patient’s care.  For patients with pain related to surgery, gynecologic malignancy or cancer treatment, the plan is as noted in the assessment/plan.  For patients with pain not related to these issues, they are to seek any further needed care from a more appropriate provider, such as PCP.    Follow-up:     Return to clinic in 6 months for ongoing cancer surveillance.      Electronically signed by JIMBO Zapata on 07/16/2025

## 2025-07-24 ENCOUNTER — OFFICE VISIT (OUTPATIENT)
Dept: INTERNAL MEDICINE | Facility: CLINIC | Age: 85
End: 2025-07-24
Payer: MEDICARE

## 2025-07-24 VITALS
WEIGHT: 135.4 LBS | HEIGHT: 62 IN | SYSTOLIC BLOOD PRESSURE: 140 MMHG | DIASTOLIC BLOOD PRESSURE: 62 MMHG | OXYGEN SATURATION: 95 % | HEART RATE: 72 BPM | RESPIRATION RATE: 10 BRPM | TEMPERATURE: 98.4 F | BODY MASS INDEX: 24.92 KG/M2

## 2025-07-24 DIAGNOSIS — S81.812A LACERATION OF LEFT LOWER EXTREMITY, INITIAL ENCOUNTER: Primary | ICD-10-CM

## 2025-07-24 RX ORDER — MUPIROCIN 2 %
1 OINTMENT (GRAM) TOPICAL 2 TIMES DAILY
Qty: 22 G | Refills: 0 | Status: SHIPPED | OUTPATIENT
Start: 2025-07-24

## 2025-07-24 RX ORDER — DOXYCYCLINE 100 MG/1
100 CAPSULE ORAL 2 TIMES DAILY
Qty: 14 CAPSULE | Refills: 0 | Status: SHIPPED | OUTPATIENT
Start: 2025-07-24 | End: 2025-07-31

## 2025-07-24 NOTE — PROGRESS NOTES
Subjective   Anabell Vera is a 85 y.o. female    Chief Complaint   Patient presents with    Leg Injury     Patient injured lower leg and ankle with a brian while doing yard work this past Saturday.  Patient stated that she had a deep cut.     Leg Injury  Symptoms: no abdominal pain, no chest pain, no chills, no cough, no fatigue, no fever, no nausea and no vomiting       Pt presents with c/o a laceration on there left lower leg.  She was working out in her yard 5 days ago, trimming bushes and dropped the trimmers.  The brian bounced and the sharp portion of the brian struck her in the left shin.  She has an open wound that is sore, red and bruised.      The following portions of the patient's history were reviewed and updated as appropriate: allergies, current medications, past family history, past medical history, past social history, past surgical history, and problem list.    Current Outpatient Medications:     acetaminophen (TYLENOL) 325 MG tablet, Take 2 tablets by mouth Every 4 (Four) Hours As Needed for Mild Pain., Disp: 60 tablet, Rfl: 0    atorvastatin (LIPITOR) 80 MG tablet, TAKE 1/2 TABLET BY MOUTH EVERY NIGHT AT BEDTIME, Disp: 45 tablet, Rfl: 0    Calcium-Magnesium-Vitamin D 600-300-400 liquid, Take  by mouth Daily. 2 tbsp daily, Disp: , Rfl:     clopidogrel (PLAVIX) 75 MG tablet, TAKE 1 TABLET BY MOUTH DAILY, Disp: 90 tablet, Rfl: 1    fluticasone (FLONASE) 50 MCG/ACT nasal spray, Administer 2 sprays into the nostril(s) as directed by provider As Needed for Allergies., Disp: , Rfl:     hydrocortisone 1 % cream, As Needed for Irritation or Rash. PRN, Disp: , Rfl:     ketoconazole (NIZORAL) 2 % cream, Apply 1 Application topically to the appropriate area as directed Every Night. (Patient taking differently: Apply 1 Application topically to the appropriate area as directed As Needed.), Disp: 30 g, Rfl: 0    Multiple Vitamins-Minerals (MULTI VITAMIN/MINERALS PO), Take 1 tablet by mouth Daily., Disp: ,  Rfl:     ramipril (ALTACE) 10 MG capsule, Take 1 capsule by mouth Daily., Disp: 90 capsule, Rfl: 1    doxycycline (VIBRAMYCIN) 100 MG capsule, Take 1 capsule by mouth 2 (Two) Times a Day for 7 days., Disp: 14 capsule, Rfl: 0    mupirocin (BACTROBAN) 2 % ointment, Apply 1 Application topically to the appropriate area as directed 2 (Two) Times a Day., Disp: 22 g, Rfl: 0     Review of Systems   Constitutional:  Negative for chills, fatigue and fever.   Respiratory:  Negative for cough, chest tightness and shortness of breath.    Cardiovascular:  Negative for chest pain.   Gastrointestinal:  Negative for abdominal pain, diarrhea, nausea and vomiting.   Endocrine: Negative for cold intolerance and heat intolerance.   Musculoskeletal:  Negative for arthralgias.   Skin:  Positive for wound.   Neurological:  Negative for dizziness.       Objective   Physical Exam  Constitutional:       Appearance: She is well-developed.   HENT:      Head: Normocephalic and atraumatic.   Eyes:      Conjunctiva/sclera: Conjunctivae normal.      Pupils: Pupils are equal, round, and reactive to light.   Cardiovascular:      Rate and Rhythm: Normal rate and regular rhythm.      Heart sounds: Normal heart sounds.   Pulmonary:      Effort: Pulmonary effort is normal.      Breath sounds: Normal breath sounds.   Abdominal:      General: Bowel sounds are normal.      Palpations: Abdomen is soft.   Musculoskeletal:         General: Normal range of motion.      Cervical back: Normal range of motion.   Skin:     General: Skin is warm and dry.      Findings: Wound present. Laceration: LLE anterior schin with open wound, surrounded by erythema and echymosis.  slightly inflammed.  Neurological:      Mental Status: She is alert and oriented to person, place, and time.      Deep Tendon Reflexes: Reflexes are normal and symmetric.   Psychiatric:         Behavior: Behavior normal.         Thought Content: Thought content normal.         Judgment: Judgment  "normal.       Vitals:    07/24/25 1339   BP: 140/62   BP Location: Right arm   Patient Position: Sitting   Cuff Size: Adult   Pulse: 72   Resp: 10   Temp: 98.4 °F (36.9 °C)   TempSrc: Infrared   SpO2: 95%   Weight: 61.4 kg (135 lb 6.4 oz)   Height: 157.5 cm (62\")         Assessment & Plan   Diagnoses and all orders for this visit:    1. Laceration of left lower extremity, initial encounter (Primary)  -     mupirocin (BACTROBAN) 2 % ointment; Apply 1 Application topically to the appropriate area as directed 2 (Two) Times a Day.  Dispense: 22 g; Refill: 0  -     doxycycline (VIBRAMYCIN) 100 MG capsule; Take 1 capsule by mouth 2 (Two) Times a Day for 7 days.  Dispense: 14 capsule; Refill: 0      Bactroban ointment to affected area BID  Doxy x 7 days as directed  Wash with soap and water  Tetanus is UTD (1/2025)  Return for Next scheduled follow up.             "

## 2025-08-01 ENCOUNTER — OFFICE VISIT (OUTPATIENT)
Dept: INTERNAL MEDICINE | Facility: CLINIC | Age: 85
End: 2025-08-01
Payer: MEDICARE

## 2025-08-01 VITALS
SYSTOLIC BLOOD PRESSURE: 138 MMHG | HEART RATE: 65 BPM | WEIGHT: 137 LBS | OXYGEN SATURATION: 95 % | TEMPERATURE: 97.7 F | DIASTOLIC BLOOD PRESSURE: 68 MMHG | HEIGHT: 62 IN | BODY MASS INDEX: 25.21 KG/M2

## 2025-08-01 DIAGNOSIS — L08.9 INFECTED LACERATION: Primary | ICD-10-CM

## 2025-08-01 DIAGNOSIS — T14.8XXA INFECTED LACERATION: Primary | ICD-10-CM

## 2025-08-01 DIAGNOSIS — E78.2 MIXED HYPERLIPIDEMIA: ICD-10-CM

## 2025-08-01 RX ORDER — CLINDAMYCIN HYDROCHLORIDE 150 MG/1
150 CAPSULE ORAL 3 TIMES DAILY
Qty: 21 CAPSULE | Refills: 0 | Status: SHIPPED | OUTPATIENT
Start: 2025-08-01

## 2025-08-01 RX ORDER — ATORVASTATIN CALCIUM 80 MG/1
40 TABLET, FILM COATED ORAL
Qty: 45 TABLET | Refills: 1 | Status: SHIPPED | OUTPATIENT
Start: 2025-08-01

## 2025-08-01 NOTE — PROGRESS NOTES
"Subjective   Anabell Vera is a 85 y.o. female.         Chief Complaint   Patient presents with    Laceration     Laceration on lower left leg.  Saw Krystina LOPEZ on 7/24       Visit Vitals  /68 (BP Location: Left arm, Patient Position: Sitting, Cuff Size: Adult)   Pulse 65   Temp 97.7 °F (36.5 °C)   Ht 157.5 cm (62\")   Wt 62.1 kg (137 lb)   LMP  (LMP Unknown)   SpO2 95%   BMI 25.06 kg/m²         Wound Check  She was originally treated 10 to 14 days ago. Previous treatment included oral antibiotics and wound cleansing or irrigation. Her temperature was unmeasured prior to arrival. There has been no drainage from the wound. The redness has not changed. The swelling has not changed. The pain has improved. She has no difficulty moving the affected extremity or digit.   Hyperlipidemia  This is a chronic problem. The current episode started more than 1 year ago. The problem is controlled. Recent lipid tests were reviewed and are normal. Exacerbating diseases include chronic renal disease. She has no history of diabetes, hypothyroidism, liver disease, obesity or nephrotic syndrome. There are no known factors aggravating her hyperlipidemia. Pertinent negatives include no chest pain, focal sensory loss, focal weakness, leg pain, myalgias or shortness of breath. Current antihyperlipidemic treatment includes statins. The current treatment provides significant improvement of lipids. There are no compliance problems.  Risk factors for coronary artery disease include dyslipidemia, post-menopausal, hypertension and family history.      Pt had clippers cut her left lower leg about 7/19/25.  Pt cleaned the wound to H2O2 and soap and water and is using antibiotic ointment and doxycycline. Pt  and mildly erythematous. Tdap was done in January.       The following portions of the patient's history were reviewed and updated as appropriate: allergies, current medications, past family history, past medical history, past " social history, past surgical history, and problem list.    Past Medical History:   Diagnosis Date    Acquired female bladder prolapse 01/2022    Eczema 03/21/2018    Endometrial cancer 03/29/2023    Female bladder prolapse 01/2022    Hematuria, microscopic 2012    W/u by urology was (-)    High cholesterol 2016    Started Rx after TIA    History of brachytherapy 08/28/2023    vaginal brachytherapy    History of radiation therapy 08/2023    proximal vagina for endometrial CA    Hypertension 2016    Hyperthyroidism 1985    Resolved w/o Tx after ~ 6 months    PFO (patent foramen ovale) 10/2016    per echo    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    Right kidney mass 2016    found by Dr Cameron Hall    Single kidney 01/26/2018    TIA (transient ischemic attack) 10/2016      Past Surgical History:   Procedure Laterality Date    BASAL CELL CARCINOMA EXCISION  2006    left temple    COLONOSCOPY  10/10/2019    Dr Mattson, 5 yr repeat family hx colon ca    COLONOSCOPY W/ POLYPECTOMY  10/14/2024    Dr Mattson, hyperplastic polyp    LAPAROSCOPIC CHOLECYSTECTOMY  12/04/2012    LAPAROSCOPIC NEPHRECTOMY Right 01/26/2018    Oncocytoma    OOPHORECTOMY      TOTAL LAPAROSCOPIC HYSTERECTOMY SALPINGO OOPHORECTOMY N/A 05/09/2023    Procedure: TOTAL LAPAROSCOPIC HYSTERECTOMY BILATERAL SALPINGO-OOPHORECTOMY, INJECTION FOR SENTINEL LYMPH NODE MAPPING, BILATERAL SENTINEL LYMPH NODE DISSECTION WITH DAVINCI ROBOT;  Surgeon: Lindsay Kingston MD;  Location: [facility];  Service: Robotics - DaVinci;  Laterality: N/A;    WISDOM TOOTH EXTRACTION  1959      Family History   Problem Relation Age of Onset    Arthritis Mother     Diabetes Mother     Heart failure Mother     Cancer Father     Colon cancer Father     Pneumonia Father     Diabetes Sister     Heart attack Sister     Multiple sclerosis Sister     Diabetes Brother     Heart attack Brother     Cancer Brother     Colon polyps Brother     Prostate cancer Brother     Cancer Brother         on  lips    Diabetes Maternal Grandmother     Cervical cancer Maternal Grandmother     Diabetes Son     Ovarian cancer Neg Hx     Breast cancer Neg Hx       Social History     Socioeconomic History    Marital status:    Tobacco Use    Smoking status: Former     Current packs/day: 0.00     Average packs/day: 0.3 packs/day for 2.0 years (0.5 ttl pk-yrs)     Types: Cigarettes     Start date:      Quit date:      Years since quittin.6    Smokeless tobacco: Never    Tobacco comments:     2 years only   Vaping Use    Vaping status: Never Used   Substance and Sexual Activity    Alcohol use: Yes     Comment: rarely drinks wine    Drug use: No    Sexual activity: Not Currently     Partners: Male      Allergies   Allergen Reactions    Ibuprofen Other (See Comments)     ONLY HAS ONE KIDNEY, NO IBUPROFEN    Wheat Rash     Gluten    Amoxicillin Rash and Other (See Comments)     amoxicillin trihydrate    Garlic Itching    Latex Rash and Other (See Comments)    Penicillins Rash    Polymyxin B-Trimethoprim Other (See Comments)     Red eye       Review of Systems   Respiratory:  Negative for shortness of breath.    Cardiovascular:  Negative for chest pain.   Musculoskeletal:  Negative for myalgias.   Neurological:  Negative for focal weakness.       Objective   Physical Exam  Vitals and nursing note reviewed.   Constitutional:       Appearance: She is well-developed.   HENT:      Head: Normocephalic.      Right Ear: External ear normal.      Left Ear: External ear normal.      Nose: Nose normal.   Eyes:      General: Lids are normal.      Conjunctiva/sclera: Conjunctivae normal.      Pupils: Pupils are equal, round, and reactive to light.   Neck:      Thyroid: No thyroid mass or thyromegaly.      Vascular: No carotid bruit.      Trachea: Trachea normal.   Cardiovascular:      Rate and Rhythm: Normal rate and regular rhythm.      Heart sounds: No murmur heard.  Pulmonary:      Effort: Pulmonary effort is normal. No  respiratory distress.      Breath sounds: Normal breath sounds. No decreased breath sounds, wheezing, rhonchi or rales.   Chest:      Chest wall: No tenderness.   Abdominal:      General: Bowel sounds are normal.      Palpations: Abdomen is soft.      Tenderness: There is no abdominal tenderness.   Musculoskeletal:         General: Normal range of motion.      Cervical back: Normal range of motion and neck supple.   Skin:     General: Skin is warm and dry.             Comments: Healing laceration about 1.5 cm left anterior lower leg with mild medial erythema and tenderness.    Neurological:      Mental Status: She is alert and oriented to person, place, and time.   Psychiatric:         Behavior: Behavior normal.         Assessment & Plan   Problems Addressed this Visit          Cardiac and Vasculature    Mixed hyperlipidemia    Relevant Medications    atorvastatin (LIPITOR) 80 MG tablet     Other Visit Diagnoses         Infected laceration    -  Primary    Relevant Medications    clindamycin (Cleocin) 150 MG capsule          Diagnoses         Codes Comments      Infected laceration    -  Primary ICD-10-CM: T14.8XXA, L08.9  ICD-9-CM: 879.9       Mixed hyperlipidemia     ICD-10-CM: E78.2  ICD-9-CM: 272.2             Add yogurt.  Trial of clindamycin.            Current Outpatient Medications:     acetaminophen (TYLENOL) 325 MG tablet, Take 2 tablets by mouth Every 4 (Four) Hours As Needed for Mild Pain., Disp: 60 tablet, Rfl: 0    atorvastatin (LIPITOR) 80 MG tablet, Take 0.5 tablets by mouth every night at bedtime., Disp: 45 tablet, Rfl: 1    Calcium-Magnesium-Vitamin D 600-300-400 liquid, Take  by mouth Daily. 2 tbsp daily, Disp: , Rfl:     clopidogrel (PLAVIX) 75 MG tablet, TAKE 1 TABLET BY MOUTH DAILY, Disp: 90 tablet, Rfl: 1    fluticasone (FLONASE) 50 MCG/ACT nasal spray, Administer 2 sprays into the nostril(s) as directed by provider As Needed for Allergies., Disp: , Rfl:     hydrocortisone 1 % cream, As Needed  for Irritation or Rash. PRN, Disp: , Rfl:     ketoconazole (NIZORAL) 2 % cream, Apply 1 Application topically to the appropriate area as directed Every Night. (Patient taking differently: Apply 1 Application topically to the appropriate area as directed As Needed.), Disp: 30 g, Rfl: 0    Multiple Vitamins-Minerals (MULTI VITAMIN/MINERALS PO), Take 1 tablet by mouth Daily., Disp: , Rfl:     mupirocin (BACTROBAN) 2 % ointment, Apply 1 Application topically to the appropriate area as directed 2 (Two) Times a Day., Disp: 22 g, Rfl: 0    ramipril (ALTACE) 10 MG capsule, Take 1 capsule by mouth Daily., Disp: 90 capsule, Rfl: 1    clindamycin (Cleocin) 150 MG capsule, Take 1 capsule by mouth 3 (Three) Times a Day., Disp: 21 capsule, Rfl: 0    Return in about 1 week (around 8/8/2025) for Recheck leg wound.

## 2025-08-11 ENCOUNTER — OFFICE VISIT (OUTPATIENT)
Dept: INTERNAL MEDICINE | Facility: CLINIC | Age: 85
End: 2025-08-11
Payer: MEDICARE

## 2025-08-11 VITALS
OXYGEN SATURATION: 97 % | HEIGHT: 62 IN | HEART RATE: 69 BPM | BODY MASS INDEX: 25.21 KG/M2 | WEIGHT: 137 LBS | DIASTOLIC BLOOD PRESSURE: 68 MMHG | TEMPERATURE: 96.9 F | SYSTOLIC BLOOD PRESSURE: 136 MMHG

## 2025-08-11 DIAGNOSIS — T14.8XXA INFECTED LACERATION: Primary | ICD-10-CM

## 2025-08-11 DIAGNOSIS — R73.09 ABNORMAL GLUCOSE: ICD-10-CM

## 2025-08-11 DIAGNOSIS — L08.9 INFECTED LACERATION: Primary | ICD-10-CM

## 2025-08-11 DIAGNOSIS — E78.2 MIXED HYPERLIPIDEMIA: ICD-10-CM

## 2025-08-11 DIAGNOSIS — I10 PRIMARY HYPERTENSION: ICD-10-CM

## 2025-08-11 DIAGNOSIS — E55.9 VITAMIN D DEFICIENCY: ICD-10-CM

## 2025-08-11 PROCEDURE — G2211 COMPLEX E/M VISIT ADD ON: HCPCS | Performed by: FAMILY MEDICINE

## 2025-08-11 PROCEDURE — 1159F MED LIST DOCD IN RCRD: CPT | Performed by: FAMILY MEDICINE

## 2025-08-11 PROCEDURE — 3078F DIAST BP <80 MM HG: CPT | Performed by: FAMILY MEDICINE

## 2025-08-11 PROCEDURE — 3075F SYST BP GE 130 - 139MM HG: CPT | Performed by: FAMILY MEDICINE

## 2025-08-11 PROCEDURE — 1125F AMNT PAIN NOTED PAIN PRSNT: CPT | Performed by: FAMILY MEDICINE

## 2025-08-11 PROCEDURE — 99214 OFFICE O/P EST MOD 30 MIN: CPT | Performed by: FAMILY MEDICINE

## 2025-08-11 PROCEDURE — 1160F RVW MEDS BY RX/DR IN RCRD: CPT | Performed by: FAMILY MEDICINE

## 2025-08-11 RX ORDER — RAMIPRIL 10 MG/1
10 CAPSULE ORAL DAILY
Qty: 90 CAPSULE | Refills: 1 | Status: SHIPPED | OUTPATIENT
Start: 2025-08-11

## (undated) DEVICE — CANNULA SEAL

## (undated) DEVICE — PATIENT RETURN ELECTRODE, SINGLE-USE, CONTACT QUALITY MONITORING, ADULT, WITH 9FT CORD, FOR PATIENTS WEIGING OVER 33LBS. (15KG): Brand: MEGADYNE

## (undated) DEVICE — ST TBG CONN PNEUMOCLEAR EVAC SMOKE HEAT/HUMID

## (undated) DEVICE — ANTIBACTERIAL UNDYED BRAIDED (POLYGLACTIN 910), SYNTHETIC ABSORBABLE SURGICAL SUTURE: Brand: COATED VICRYL

## (undated) DEVICE — MANIP UTER RUMI 2 KOH EFFICIENT 2.5CM BL

## (undated) DEVICE — BLANKT WARM UPPR/BDY ARM/OUT 57X196CM

## (undated) DEVICE — ARM DRAPE

## (undated) DEVICE — SCRB SURG BACTOSHIELD CHG 4PCT 4OZ

## (undated) DEVICE — BLADELESS OBTURATOR: Brand: WECK VISTA

## (undated) DEVICE — COLUMN DRAPE

## (undated) DEVICE — SUT MNCRYL PLS ANTIB UD 3/0 PS2 27IN

## (undated) DEVICE — ADHS SKIN PREMIERPRO EXOFIN TOPICAL HI/VISC .5ML

## (undated) DEVICE — GLV SURG SENSICARE W/ALOE PF LF 6.5 STRL

## (undated) DEVICE — SYNCHROSEAL: Brand: DA VINCI ENERGY

## (undated) DEVICE — ANTIBACTERIAL UNDYED BRAIDED (POLYGLACTIN 910), SYNTHETIC ABSORBABLE SUTURE: Brand: COATED VICRYL

## (undated) DEVICE — TRENDELENBURG WINGPAD POSITIONING KIT DELUXE - WITHOUT BODY STRAP: Brand: SOULE MEDICAL

## (undated) DEVICE — MANIP UTER RUMI TP 5.1MM 6CM LAV

## (undated) DEVICE — APPL CHLORAPREP TINTED 26ML TEAL

## (undated) DEVICE — ENDOPATH PNEUMONEEDLE INSUFFLATION NEEDLES WITH LUER LOCK CONNECTORS 120MM: Brand: ENDOPATH

## (undated) DEVICE — PK MAJ GYN DAVINCI 10

## (undated) DEVICE — UNDERGLV SURG BIOGEL INDICAT PF 61/2 GRN

## (undated) DEVICE — SYRINGE, LUER LOCK, 5ML: Brand: MEDLINE

## (undated) DEVICE — TIP COVER ACCESSORY

## (undated) DEVICE — SHEET, DRAPE, SPLIT, STERILE: Brand: MEDLINE